# Patient Record
Sex: MALE | Race: WHITE | NOT HISPANIC OR LATINO | Employment: OTHER | URBAN - METROPOLITAN AREA
[De-identification: names, ages, dates, MRNs, and addresses within clinical notes are randomized per-mention and may not be internally consistent; named-entity substitution may affect disease eponyms.]

---

## 2017-01-11 ENCOUNTER — RESULTS ENCOUNTER (OUTPATIENT)
Dept: FAMILY MEDICINE CLINIC | Facility: CLINIC | Age: 77
End: 2017-01-11

## 2017-01-11 DIAGNOSIS — E11.9 TYPE 2 DIABETES MELLITUS WITHOUT COMPLICATION (HCC): ICD-10-CM

## 2017-01-11 DIAGNOSIS — K21.9 GASTROESOPHAGEAL REFLUX DISEASE WITHOUT ESOPHAGITIS: ICD-10-CM

## 2017-01-11 DIAGNOSIS — I10 ESSENTIAL HYPERTENSION: ICD-10-CM

## 2017-01-11 DIAGNOSIS — E78.5 HYPERLIPIDEMIA: ICD-10-CM

## 2017-01-18 ENCOUNTER — ANESTHESIA EVENT (OUTPATIENT)
Dept: GASTROENTEROLOGY | Facility: HOSPITAL | Age: 77
End: 2017-01-18

## 2017-01-18 ENCOUNTER — ANESTHESIA (OUTPATIENT)
Dept: GASTROENTEROLOGY | Facility: HOSPITAL | Age: 77
End: 2017-01-18

## 2017-01-18 PROCEDURE — 25010000002 PROPOFOL 10 MG/ML EMULSION: Performed by: ANESTHESIOLOGY

## 2017-01-18 RX ORDER — GLYCOPYRROLATE 0.2 MG/ML
INJECTION INTRAMUSCULAR; INTRAVENOUS AS NEEDED
Status: DISCONTINUED | OUTPATIENT
Start: 2017-01-18 | End: 2017-01-18 | Stop reason: SURG

## 2017-01-18 RX ORDER — PROPOFOL 10 MG/ML
VIAL (ML) INTRAVENOUS AS NEEDED
Status: DISCONTINUED | OUTPATIENT
Start: 2017-01-18 | End: 2017-01-18 | Stop reason: SURG

## 2017-01-18 RX ORDER — LIDOCAINE HYDROCHLORIDE 20 MG/ML
INJECTION, SOLUTION INFILTRATION; PERINEURAL AS NEEDED
Status: DISCONTINUED | OUTPATIENT
Start: 2017-01-18 | End: 2017-01-18 | Stop reason: SURG

## 2017-01-18 RX ADMIN — GLYCOPYRROLATE 0.2 MCG: 0.2 INJECTION INTRAMUSCULAR; INTRAVENOUS at 08:11

## 2017-01-18 RX ADMIN — LIDOCAINE HYDROCHLORIDE 100 MG: 20 INJECTION, SOLUTION INFILTRATION; PERINEURAL at 08:11

## 2017-01-18 RX ADMIN — PROPOFOL 100 MG: 10 INJECTION, EMULSION INTRAVENOUS at 08:11

## 2017-01-18 RX ADMIN — SODIUM CHLORIDE, POTASSIUM CHLORIDE, SODIUM LACTATE AND CALCIUM CHLORIDE: 600; 310; 30; 20 INJECTION, SOLUTION INTRAVENOUS at 08:04

## 2017-01-18 RX ADMIN — PROPOFOL 100 MG: 10 INJECTION, EMULSION INTRAVENOUS at 08:30

## 2017-01-18 RX ADMIN — PROPOFOL 100 MG: 10 INJECTION, EMULSION INTRAVENOUS at 08:20

## 2017-01-18 NOTE — ANESTHESIA PREPROCEDURE EVALUATION
Anesthesia Evaluation     Patient summary reviewed and Nursing notes reviewed    Airway   Mallampati: II  Neck ROM: full  no difficulty expected  Dental    (+) upper dentures and lower dentures    Comment: Lower partial    Pulmonary     breath sounds clear to auscultation  (+) hx of smoking,   Cardiovascular   (+) hypertension,     Rhythm: regular    Neuro/Psych- negative ROS  GI/Hepatic/Renal/Endo    (+)  GERD, diabetes mellitus,     Musculoskeletal (-) negative ROS    Abdominal    Substance History - negative use     OB/GYN negative ob/gyn ROS         Other                             Anesthesia Plan    ASA 2     MAC     intravenous induction   Anesthetic plan and risks discussed with patient.

## 2017-01-18 NOTE — ANESTHESIA POSTPROCEDURE EVALUATION
Patient: Eric Dukes    Procedure Summary     Date Anesthesia Start Anesthesia Stop Room / Location    01/18/17 0810 0840  [facility] /  [facility]       Procedure Diagnosis Surgeon Provider    COLONOSCOPY TO CECUM AND INTO TERMINAL ILEUM WITH COLD SNARE POLYPECTOMY (N/A ) History of adenomatous polyp of colon; Rectal polyp; Internal hemorrhoids  (History of adenomatous polyp of colon [Z86.010]) MD Kal Osman MD          Anesthesia Type: MAC  Last vitals  BP      Temp      Pulse    Resp      SpO2        Post Anesthesia Care and Evaluation    Patient location during evaluation: PACU  Patient participation: waiting for patient participation  Level of consciousness: responsive to verbal stimuli  Pain score: 0  Pain management: adequate  Airway patency: patent  Anesthetic complications: No anesthetic complications    Cardiovascular status: acceptable  Respiratory status: acceptable  Hydration status: acceptable

## 2017-01-30 ENCOUNTER — OFFICE VISIT (OUTPATIENT)
Dept: FAMILY MEDICINE CLINIC | Facility: CLINIC | Age: 77
End: 2017-01-30

## 2017-01-30 VITALS
BODY MASS INDEX: 28.04 KG/M2 | WEIGHT: 185 LBS | RESPIRATION RATE: 16 BRPM | HEART RATE: 60 BPM | DIASTOLIC BLOOD PRESSURE: 62 MMHG | OXYGEN SATURATION: 95 % | HEIGHT: 68 IN | TEMPERATURE: 97 F | SYSTOLIC BLOOD PRESSURE: 140 MMHG

## 2017-01-30 DIAGNOSIS — I10 ESSENTIAL HYPERTENSION: ICD-10-CM

## 2017-01-30 DIAGNOSIS — E11.9 TYPE 2 DIABETES MELLITUS WITHOUT COMPLICATION, WITHOUT LONG-TERM CURRENT USE OF INSULIN (HCC): Primary | ICD-10-CM

## 2017-01-30 DIAGNOSIS — E78.2 MIXED HYPERLIPIDEMIA: ICD-10-CM

## 2017-01-30 DIAGNOSIS — K21.9 GASTROESOPHAGEAL REFLUX DISEASE WITHOUT ESOPHAGITIS: ICD-10-CM

## 2017-01-30 PROCEDURE — 99214 OFFICE O/P EST MOD 30 MIN: CPT | Performed by: FAMILY MEDICINE

## 2017-01-30 RX ORDER — METOPROLOL TARTRATE 100 MG/1
100 TABLET ORAL 2 TIMES DAILY
Qty: 180 TABLET | Refills: 1 | Status: SHIPPED | OUTPATIENT
Start: 2017-01-30 | End: 2019-03-27 | Stop reason: SDUPTHER

## 2017-01-30 RX ORDER — SIMVASTATIN 40 MG
40 TABLET ORAL NIGHTLY
Qty: 90 TABLET | Refills: 1 | Status: SHIPPED | OUTPATIENT
Start: 2017-01-30

## 2017-01-30 RX ORDER — OMEPRAZOLE 20 MG/1
20 CAPSULE, DELAYED RELEASE ORAL DAILY
Qty: 90 CAPSULE | Refills: 1 | Status: SHIPPED | OUTPATIENT
Start: 2017-01-30 | End: 2017-07-28 | Stop reason: SDUPTHER

## 2017-01-30 RX ORDER — AMLODIPINE BESYLATE 10 MG/1
10 TABLET ORAL DAILY
Qty: 90 TABLET | Refills: 1 | Status: SHIPPED | OUTPATIENT
Start: 2017-01-30

## 2017-01-30 RX ORDER — LOSARTAN POTASSIUM 50 MG/1
50 TABLET ORAL 2 TIMES DAILY
Qty: 180 TABLET | Refills: 1 | Status: SHIPPED | OUTPATIENT
Start: 2017-01-30 | End: 2019-03-29 | Stop reason: SDUPTHER

## 2017-01-30 RX ORDER — HYDROCHLOROTHIAZIDE 12.5 MG/1
12.5 TABLET ORAL DAILY
Qty: 90 TABLET | Refills: 1 | Status: SHIPPED | OUTPATIENT
Start: 2017-01-30 | End: 2017-07-28 | Stop reason: SDUPTHER

## 2017-01-30 NOTE — MR AVS SNAPSHOT
Eric Dukes   1/30/2017 1:30 PM   Office Visit    Provider:  Eric Tang MD   Department:  [facility]   Dept Phone:  [phone]                Your Full Care Plan              Today's Medication Changes          These changes are accurate as of: 1/30/17  2:59 PM.  If you have any questions, ask your nurse or doctor.               Stop taking medication(s)listed here:     sildenafil 100 MG tablet   Commonly known as:  VIAGRA   Stopped by:  Eric Tang MD                Where to Get Your Medications      These medications were sent to [facility] - [place], [place] - [address] - [phone] [facility] [phone] FX  [address], [facility] [address]     Phone:  [phone]     hydrochlorothiazide 12.5 MG tablet    metFORMIN 500 MG tablet    omeprazole 20 MG capsule         You can get these medications from any pharmacy     Bring a paper prescription for each of these medications     amLODIPine 10 MG tablet    losartan 50 MG tablet    metoprolol tartrate 100 MG tablet    simvastatin 40 MG tablet                  Your Updated Medication List          This list is accurate as of: 1/30/17  2:59 PM.  Always use your most recent med list.                allopurinol 300 MG tablet   Commonly known as:  ZYLOPRIM       amLODIPine 10 MG tablet   Commonly known as:  NORVASC   Take 1 tablet by mouth Daily.       aspirin 81 MG tablet       fish oil 1000 MG capsule capsule       FREESTYLE LITE test strip   Generic drug:  glucose blood   USE ONE STRIP ONCE DAILY AS DIRECTED       hydrochlorothiazide 12.5 MG tablet   Commonly known as:  HYDRODIURIL   Take 1 tablet by mouth Daily.       losartan 50 MG tablet   Commonly known as:  COZAAR   Take 1 tablet by mouth 2 (Two) Times a Day.       metFORMIN 500 MG tablet   Commonly known as:  GLUCOPHAGE   Take two tablets twice a day with meals       metoprolol tartrate 100 MG tablet   Commonly  known as:  LOPRESSOR   Take 1 tablet by mouth 2 (Two) Times a Day.       multivitamin tablet       omeprazole 20 MG capsule   Commonly known as:  priLOSEC   Take 1 capsule by mouth Daily.       simvastatin 40 MG tablet   Commonly known as:  ZOCOR   Take 1 tablet by mouth Every Night.               You Were Diagnosed With        Codes Comments    Type 2 diabetes mellitus without complication, without long-term current use of insulin    -  Primary ICD-10-CM: E11.9  ICD-9-CM: 250.00     Essential hypertension     ICD-10-CM: I10  ICD-9-CM: 401.9     Gastroesophageal reflux disease without esophagitis     ICD-10-CM: K21.9  ICD-9-CM: 530.81     Mixed hyperlipidemia     ICD-10-CM: E78.2  ICD-9-CM: 272.2       Instructions    Exercise 30 minutes most days of the week  Sleep 6-8 hours each night if possible  Low fat, low cholesterol diet   we discussed prescribed medications and how to take them   make sure you get results of any labs/studies ordered today  Low glycemic index diet  Labs   psa  And see me 1 month         Patient Instructions History      [employer] Signup     [facility] [employer] allows you to send messages to your doctor, view your test results, renew your prescriptions, schedule appointments, and more. To sign up, go to [employer] and click on the Sign Up Now link in the New User? box. Enter your [employer] Activation Code exactly as it appears below along with the last four digits of your Social Security Number and your Date of Birth () to complete the sign-up process. If you do not sign up before the expiration date, you must request a new code.    [employer] Activation Code: [email or web address]  Expires: 2017  8:43 AM    If you have questions, you can email [email or web address] or call [phone] to talk to our [employer] staff. Remember, [employer] is NOT to be used for urgent needs. For medical emergencies, dial 911.               Other Info from Your Visit           Allergies     No  "Known Allergies      Reason for Visit     Diabetes Diabetic shoe form    Heartburn     Hyperlipidemia     Hypertension           Vital Signs     Blood Pressure Pulse Temperature Respirations Height Weight    140/62 60 97 °F (36.1 °C) (Oral) 16 67.5\" (171.5 cm) 185 lb (83.9 kg)    Oxygen Saturation Body Mass Index Smoking Status             95% 28.55 kg/m2 Former Smoker         Problems and Diagnoses Noted     High blood pressure    Acid reflux disease    High cholesterol or triglycerides    Type 2 diabetes mellitus without complication      "

## 2017-01-30 NOTE — PATIENT INSTRUCTIONS
Exercise 30 minutes most days of the week  Sleep 6-8 hours each night if possible  Low fat, low cholesterol diet   we discussed prescribed medications and how to take them   make sure you get results of any labs/studies ordered today  Low glycemic index diet  Labs   psa  And see me 1 month

## 2017-01-30 NOTE — PROGRESS NOTES
Subjective   Eric Dukes is a 76 y.o. male.     History of Present Illness   Chief Complaint:   Chief Complaint   Patient presents with   • Diabetes     Diabetic shoe form   • Heartburn   • Hyperlipidemia   • Hypertension       Eric Dukes 76 y.o. male who presents today for a follow up for hypertension, hyperlipidemia, GERD and diabetes with medication refills. He did not have labs for this appointment. He requests a form be filled out for diabetic shoes.   he has a history of   Patient Active Problem List   Diagnosis   • Hyperlipidemia   • Essential hypertension   • Gout   • Type 2 diabetes mellitus without complication   • Gastroesophageal reflux disease without esophagitis   • History of adenomatous polyp of colon   .  Since the last visit, he has overall felt well.  he has been compliant with   Current Outpatient Prescriptions:   •  allopurinol (ZYLOPRIM) 300 MG tablet, Take 300 mg by mouth daily., Disp: , Rfl:   •  amLODIPine (NORVASC) 10 MG tablet, Take 1 tablet by mouth daily., Disp: 90 tablet, Rfl: 1  •  aspirin 81 MG tablet, Take 81 mg by mouth daily., Disp: , Rfl:   •  FREESTYLE LITE test strip, USE ONE STRIP ONCE DAILY AS DIRECTED, Disp: 100 each, Rfl: 3  •  hydrochlorothiazide (HYDRODIURIL) 12.5 MG tablet, Take 1 tablet by mouth daily., Disp: 90 tablet, Rfl: 1  •  losartan (COZAAR) 50 MG tablet, Take 1 tablet by mouth 2 (two) times a day., Disp: 180 tablet, Rfl: 1  •  metFORMIN (GLUCOPHAGE) 500 MG tablet, Take two tablets twice a day with meals, Disp: 360 tablet, Rfl: 1  •  metoprolol tartrate (LOPRESSOR) 100 MG tablet, Take 1 tablet by mouth 2 (two) times a day., Disp: 180 tablet, Rfl: 1  •  Multiple Vitamin (MULTIVITAMIN) tablet, Take 1 tablet by mouth daily., Disp: , Rfl:   •  Omega-3 Fatty Acids (FISH OIL) 1000 MG capsule capsule, Take 1,000 mg by mouth 3 (three) times a day with meals., Disp: , Rfl:   •  omeprazole (PriLOSEC) 20 MG capsule, Take 1 capsule by mouth daily., Disp: 90 capsule,  "Rfl: 1  •  sildenafil (VIAGRA) 100 MG tablet, Take 1 tablet by mouth daily as needed for erectile dysfunction., Disp: 2 tablet, Rfl: 2  •  simvastatin (ZOCOR) 40 MG tablet, Take 1 tablet by mouth every night., Disp: 90 tablet, Rfl: 1.  he denies medication side effects.    All of the chronic condition(s) listed above are stable w/o issues.    Visit Vitals   • /62   • Pulse 60   • Temp 97 °F (36.1 °C) (Oral)   • Resp 16   • Ht 67.5\" (171.5 cm)   • Wt 185 lb (83.9 kg)   • SpO2 95%   • BMI 28.55 kg/m2         The following portions of the patient's history were reviewed and updated as appropriate: allergies, current medications, past family history, past medical history, past social history, past surgical history and problem list.    Review of Systems   Constitutional: Negative for activity change, appetite change and unexpected weight change.   Eyes: Negative for visual disturbance.   Respiratory: Negative for chest tightness and shortness of breath.    Cardiovascular: Negative for chest pain and palpitations.   Skin: Negative for color change.   Neurological: Negative for syncope and speech difficulty.   Psychiatric/Behavioral: Negative for confusion and decreased concentration.       Objective   Physical Exam   Constitutional: He is oriented to person, place, and time. He appears well-developed and well-nourished.   HENT:   Head: Normocephalic and atraumatic.   Nose: Nose normal.   Mouth/Throat: Oropharynx is clear and moist.   Eyes: EOM are normal. Pupils are equal, round, and reactive to light.   Neck: Normal range of motion.   Cardiovascular: Normal rate and regular rhythm.    Pulmonary/Chest: Effort normal and breath sounds normal.   Abdominal: Soft.   Musculoskeletal: Normal range of motion.   Neurological: He is alert and oriented to person, place, and time.   Skin: Skin is warm and dry.   Foot exam  Sensation ok  Some  Tingling  Color and pulses ok   Psychiatric: He has a normal mood and affect. His " behavior is normal. Judgment and thought content normal.   Nursing note and vitals reviewed.      Assessment/Plan   Eric was seen today for diabetes, heartburn, hyperlipidemia and hypertension.    Diagnoses and all orders for this visit:    Type 2 diabetes mellitus without complication, without long-term current use of insulin  -     metFORMIN (GLUCOPHAGE) 500 MG tablet; Take two tablets twice a day with meals    Essential hypertension  -     amLODIPine (NORVASC) 10 MG tablet; Take 1 tablet by mouth Daily.  -     hydrochlorothiazide (HYDRODIURIL) 12.5 MG tablet; Take 1 tablet by mouth Daily.  -     losartan (COZAAR) 50 MG tablet; Take 1 tablet by mouth 2 (Two) Times a Day.  -     metoprolol tartrate (LOPRESSOR) 100 MG tablet; Take 1 tablet by mouth 2 (Two) Times a Day.    Gastroesophageal reflux disease without esophagitis  -     omeprazole (priLOSEC) 20 MG capsule; Take 1 capsule by mouth Daily.    Mixed hyperlipidemia  -     simvastatin (ZOCOR) 40 MG tablet; Take 1 tablet by mouth Every Night.

## 2017-02-01 LAB
ALBUMIN SERPL-MCNC: 5 G/DL (ref 3.5–5.2)
ALBUMIN/GLOB SERPL: 2.2 G/DL
ALP SERPL-CCNC: 78 U/L (ref 39–117)
ALT SERPL-CCNC: 31 U/L (ref 1–41)
AST SERPL-CCNC: 34 U/L (ref 1–40)
BASOPHILS # BLD AUTO: 0.02 10*3/MM3 (ref 0–0.2)
BASOPHILS NFR BLD AUTO: 0.3 % (ref 0–1.5)
BILIRUB SERPL-MCNC: 0.7 MG/DL (ref 0.1–1.2)
BUN SERPL-MCNC: 23 MG/DL (ref 8–23)
BUN/CREAT SERPL: 19.7 (ref 7–25)
CALCIUM SERPL-MCNC: 10.1 MG/DL (ref 8.6–10.5)
CHLORIDE SERPL-SCNC: 99 MMOL/L (ref 98–107)
CHOLEST SERPL-MCNC: 141 MG/DL (ref 0–200)
CO2 SERPL-SCNC: 26.1 MMOL/L (ref 22–29)
CREAT SERPL-MCNC: 1.17 MG/DL (ref 0.76–1.27)
EOSINOPHIL # BLD AUTO: 0.23 10*3/MM3 (ref 0–0.7)
EOSINOPHIL NFR BLD AUTO: 3.2 % (ref 0.3–6.2)
ERYTHROCYTE [DISTWIDTH] IN BLOOD BY AUTOMATED COUNT: 14.3 % (ref 11.5–14.5)
GLOBULIN SER CALC-MCNC: 2.3 GM/DL
GLUCOSE SERPL-MCNC: 132 MG/DL (ref 65–99)
HBA1C MFR BLD: 6.5 % (ref 4.8–5.6)
HCT VFR BLD AUTO: 39.7 % (ref 40.4–52.2)
HDLC SERPL-MCNC: 39 MG/DL (ref 40–60)
HGB BLD-MCNC: 12.3 G/DL (ref 13.7–17.6)
IMM GRANULOCYTES # BLD: 0 10*3/MM3 (ref 0–0.03)
IMM GRANULOCYTES NFR BLD: 0 % (ref 0–0.5)
LDLC SERPL CALC-MCNC: 64 MG/DL (ref 0–100)
LYMPHOCYTES # BLD AUTO: 2.25 10*3/MM3 (ref 0.9–4.8)
LYMPHOCYTES NFR BLD AUTO: 31.6 % (ref 19.6–45.3)
MCH RBC QN AUTO: 25.9 PG (ref 27–32.7)
MCHC RBC AUTO-ENTMCNC: 31 G/DL (ref 32.6–36.4)
MCV RBC AUTO: 83.8 FL (ref 79.8–96.2)
MONOCYTES # BLD AUTO: 0.69 10*3/MM3 (ref 0.2–1.2)
MONOCYTES NFR BLD AUTO: 9.7 % (ref 5–12)
NEUTROPHILS # BLD AUTO: 3.92 10*3/MM3 (ref 1.9–8.1)
NEUTROPHILS NFR BLD AUTO: 55.2 % (ref 42.7–76)
PLATELET # BLD AUTO: 270 10*3/MM3 (ref 140–500)
POTASSIUM SERPL-SCNC: 5 MMOL/L (ref 3.5–5.2)
PROT SERPL-MCNC: 7.3 G/DL (ref 6–8.5)
RBC # BLD AUTO: 4.74 10*6/MM3 (ref 4.6–6)
SODIUM SERPL-SCNC: 141 MMOL/L (ref 136–145)
TRIGL SERPL-MCNC: 190 MG/DL (ref 0–150)
TSH SERPL DL<=0.005 MIU/L-ACNC: 4.07 MIU/ML (ref 0.27–4.2)
VLDLC SERPL CALC-MCNC: 38 MG/DL (ref 5–40)
WBC # BLD AUTO: 7.11 10*3/MM3 (ref 4.5–10.7)

## 2017-02-13 ENCOUNTER — OFFICE VISIT (OUTPATIENT)
Dept: FAMILY MEDICINE CLINIC | Facility: CLINIC | Age: 77
End: 2017-02-13

## 2017-02-13 VITALS
HEART RATE: 57 BPM | WEIGHT: 185 LBS | HEIGHT: 68 IN | OXYGEN SATURATION: 96 % | BODY MASS INDEX: 28.04 KG/M2 | DIASTOLIC BLOOD PRESSURE: 64 MMHG | TEMPERATURE: 97.4 F | SYSTOLIC BLOOD PRESSURE: 152 MMHG

## 2017-02-13 DIAGNOSIS — I10 ESSENTIAL HYPERTENSION: ICD-10-CM

## 2017-02-13 DIAGNOSIS — E78.5 HYPERLIPIDEMIA, UNSPECIFIED HYPERLIPIDEMIA TYPE: ICD-10-CM

## 2017-02-13 DIAGNOSIS — E11.9 TYPE 2 DIABETES MELLITUS WITHOUT COMPLICATION, WITHOUT LONG-TERM CURRENT USE OF INSULIN (HCC): Primary | ICD-10-CM

## 2017-02-13 PROCEDURE — 99213 OFFICE O/P EST LOW 20 MIN: CPT | Performed by: FAMILY MEDICINE

## 2017-02-13 NOTE — PATIENT INSTRUCTIONS
Exercise 30 minutes most days of the week  Sleep 6-8 hours each night if possible  Low fat, low cholesterol diet   we discussed prescribed medications and how to take them   make sure you get results of any labs/studies ordered today  Low glycemic index diet  Glucometer given to patient

## 2017-02-13 NOTE — PROGRESS NOTES
Subjective   Eric Dukes is a 76 y.o. male.     History of Present Illness   Chief Complaint:   Chief Complaint   Patient presents with   • Diabetes   • Hyperlipidemia       Eric Dukes 76 y.o. male who presents today for Medical Management of the below listed issues and medication refills.  he has a history of   Patient Active Problem List   Diagnosis   • Hyperlipidemia   • Essential hypertension   • Gout   • Type 2 diabetes mellitus without complication   • Gastroesophageal reflux disease without esophagitis   • History of adenomatous polyp of colon   .  Since the last visit, he has overall felt well.  he has been compliant with   Current Outpatient Prescriptions:   •  allopurinol (ZYLOPRIM) 300 MG tablet, Take 300 mg by mouth daily., Disp: , Rfl:   •  amLODIPine (NORVASC) 10 MG tablet, Take 1 tablet by mouth Daily., Disp: 90 tablet, Rfl: 1  •  aspirin 81 MG tablet, Take 81 mg by mouth daily., Disp: , Rfl:   •  FREESTYLE LITE test strip, USE ONE STRIP ONCE DAILY AS DIRECTED, Disp: 100 each, Rfl: 3  •  hydrochlorothiazide (HYDRODIURIL) 12.5 MG tablet, Take 1 tablet by mouth Daily., Disp: 90 tablet, Rfl: 1  •  losartan (COZAAR) 50 MG tablet, Take 1 tablet by mouth 2 (Two) Times a Day., Disp: 180 tablet, Rfl: 1  •  metFORMIN (GLUCOPHAGE) 500 MG tablet, Take two tablets twice a day with meals, Disp: 360 tablet, Rfl: 1  •  metoprolol tartrate (LOPRESSOR) 100 MG tablet, Take 1 tablet by mouth 2 (Two) Times a Day., Disp: 180 tablet, Rfl: 1  •  Multiple Vitamin (MULTIVITAMIN) tablet, Take 1 tablet by mouth daily., Disp: , Rfl:   •  Omega-3 Fatty Acids (FISH OIL) 1000 MG capsule capsule, Take 1,000 mg by mouth 3 (three) times a day with meals., Disp: , Rfl:   •  omeprazole (priLOSEC) 20 MG capsule, Take 1 capsule by mouth Daily., Disp: 90 capsule, Rfl: 1  •  simvastatin (ZOCOR) 40 MG tablet, Take 1 tablet by mouth Every Night., Disp: 90 tablet, Rfl: 1.  he denies medication side effects.    All of the chronic  "condition(s) listed above are stable w/o issues.    Visit Vitals   • /64   • Pulse 57   • Temp 97.4 °F (36.3 °C)   • Ht 67.5\" (171.5 cm)   • Wt 185 lb (83.9 kg)   • SpO2 96%   • BMI 28.55 kg/m2             The following portions of the patient's history were reviewed and updated as appropriate: allergies, current medications, past family history, past medical history, past social history, past surgical history and problem list.    Review of Systems   Constitutional: Negative for activity change, appetite change and unexpected weight change.   HENT: Negative for nosebleeds and trouble swallowing.    Eyes: Negative for pain and visual disturbance.   Respiratory: Negative for chest tightness, shortness of breath and wheezing.    Cardiovascular: Negative for chest pain and palpitations.   Gastrointestinal: Negative for abdominal pain and blood in stool.   Endocrine: Negative.    Genitourinary: Negative for difficulty urinating and hematuria.   Musculoskeletal: Negative for joint swelling.   Skin: Negative for color change and rash.   Allergic/Immunologic: Negative.    Neurological: Negative for syncope and speech difficulty.   Hematological: Negative for adenopathy.   Psychiatric/Behavioral: Negative for agitation and confusion.   All other systems reviewed and are negative.      Objective   Physical Exam   Cardiovascular: Normal rate and regular rhythm.    Pulmonary/Chest: Effort normal and breath sounds normal.   Skin: Skin is warm and dry.   Psychiatric: He has a normal mood and affect. His behavior is normal.   Nursing note and vitals reviewed.      Assessment/Plan   Eric was seen today for diabetes and hyperlipidemia.    Diagnoses and all orders for this visit:    Type 2 diabetes mellitus without complication, without long-term current use of insulin    Hyperlipidemia, unspecified hyperlipidemia type    Essential hypertension               "

## 2017-03-20 DIAGNOSIS — E11.9 TYPE 2 DIABETES MELLITUS WITHOUT COMPLICATION, WITHOUT LONG-TERM CURRENT USE OF INSULIN (HCC): Primary | ICD-10-CM

## 2017-03-20 RX ORDER — BLOOD-GLUCOSE METER
KIT MISCELLANEOUS
Qty: 100 EACH | Refills: 3 | Status: SHIPPED | OUTPATIENT
Start: 2017-03-20

## 2017-03-20 RX ORDER — BLOOD-GLUCOSE METER
KIT MISCELLANEOUS
Qty: 100 EACH | Refills: 3 | Status: SHIPPED | OUTPATIENT
Start: 2017-03-20 | End: 2017-03-20 | Stop reason: SDUPTHER

## 2017-06-19 DIAGNOSIS — I10 ESSENTIAL HYPERTENSION: ICD-10-CM

## 2017-06-19 DIAGNOSIS — K21.9 GASTROESOPHAGEAL REFLUX DISEASE WITHOUT ESOPHAGITIS: ICD-10-CM

## 2017-06-19 DIAGNOSIS — E11.9 TYPE 2 DIABETES MELLITUS WITHOUT COMPLICATION, WITHOUT LONG-TERM CURRENT USE OF INSULIN (HCC): ICD-10-CM

## 2017-06-19 RX ORDER — OMEPRAZOLE 20 MG/1
CAPSULE, DELAYED RELEASE ORAL
Qty: 90 CAPSULE | Refills: 1 | OUTPATIENT
Start: 2017-06-19

## 2017-06-19 RX ORDER — HYDROCHLOROTHIAZIDE 12.5 MG/1
TABLET ORAL
Qty: 90 TABLET | Refills: 1 | OUTPATIENT
Start: 2017-06-19

## 2017-07-11 DIAGNOSIS — E78.2 MIXED HYPERLIPIDEMIA: Primary | ICD-10-CM

## 2017-07-11 DIAGNOSIS — M10.9 GOUT, UNSPECIFIED CAUSE, UNSPECIFIED CHRONICITY, UNSPECIFIED SITE: ICD-10-CM

## 2017-07-11 DIAGNOSIS — I10 ESSENTIAL HYPERTENSION: ICD-10-CM

## 2017-07-11 DIAGNOSIS — E11.9 TYPE 2 DIABETES MELLITUS WITHOUT COMPLICATION, WITHOUT LONG-TERM CURRENT USE OF INSULIN (HCC): ICD-10-CM

## 2017-07-13 LAB
ALBUMIN SERPL-MCNC: 4.6 G/DL (ref 3.5–5.2)
ALBUMIN/GLOB SERPL: 2 G/DL
ALP SERPL-CCNC: 72 U/L (ref 39–117)
ALT SERPL-CCNC: 29 U/L (ref 1–41)
AST SERPL-CCNC: 27 U/L (ref 1–40)
BASOPHILS # BLD AUTO: 0.04 10*3/MM3 (ref 0–0.2)
BASOPHILS NFR BLD AUTO: 0.6 % (ref 0–1.5)
BILIRUB SERPL-MCNC: 0.5 MG/DL (ref 0.1–1.2)
BUN SERPL-MCNC: 19 MG/DL (ref 8–23)
BUN/CREAT SERPL: 16.1 (ref 7–25)
CALCIUM SERPL-MCNC: 9.6 MG/DL (ref 8.6–10.5)
CHLORIDE SERPL-SCNC: 98 MMOL/L (ref 98–107)
CHOLEST SERPL-MCNC: 142 MG/DL (ref 0–200)
CO2 SERPL-SCNC: 22.6 MMOL/L (ref 22–29)
CREAT SERPL-MCNC: 1.18 MG/DL (ref 0.76–1.27)
EOSINOPHIL # BLD AUTO: 0.25 10*3/MM3 (ref 0–0.7)
EOSINOPHIL NFR BLD AUTO: 3.6 % (ref 0.3–6.2)
ERYTHROCYTE [DISTWIDTH] IN BLOOD BY AUTOMATED COUNT: 17 % (ref 11.5–14.5)
GLOBULIN SER CALC-MCNC: 2.3 GM/DL
GLUCOSE SERPL-MCNC: 175 MG/DL (ref 65–99)
HBA1C MFR BLD: 6.72 % (ref 4.8–5.6)
HCT VFR BLD AUTO: 38.8 % (ref 40.4–52.2)
HDLC SERPL-MCNC: 40 MG/DL (ref 40–60)
HGB BLD-MCNC: 12 G/DL (ref 13.7–17.6)
IMM GRANULOCYTES # BLD: 0 10*3/MM3 (ref 0–0.03)
IMM GRANULOCYTES NFR BLD: 0 % (ref 0–0.5)
LDLC SERPL CALC-MCNC: 66 MG/DL (ref 0–100)
LYMPHOCYTES # BLD AUTO: 2.16 10*3/MM3 (ref 0.9–4.8)
LYMPHOCYTES NFR BLD AUTO: 31.1 % (ref 19.6–45.3)
MCH RBC QN AUTO: 25 PG (ref 27–32.7)
MCHC RBC AUTO-ENTMCNC: 30.9 G/DL (ref 32.6–36.4)
MCV RBC AUTO: 80.8 FL (ref 79.8–96.2)
MONOCYTES # BLD AUTO: 0.66 10*3/MM3 (ref 0.2–1.2)
MONOCYTES NFR BLD AUTO: 9.5 % (ref 5–12)
NEUTROPHILS # BLD AUTO: 3.84 10*3/MM3 (ref 1.9–8.1)
NEUTROPHILS NFR BLD AUTO: 55.2 % (ref 42.7–76)
PLATELET # BLD AUTO: 262 10*3/MM3 (ref 140–500)
POTASSIUM SERPL-SCNC: 5.1 MMOL/L (ref 3.5–5.2)
PROT SERPL-MCNC: 6.9 G/DL (ref 6–8.5)
RBC # BLD AUTO: 4.8 10*6/MM3 (ref 4.6–6)
SODIUM SERPL-SCNC: 138 MMOL/L (ref 136–145)
TRIGL SERPL-MCNC: 179 MG/DL (ref 0–150)
TSH SERPL DL<=0.005 MIU/L-ACNC: 3.15 MIU/ML (ref 0.27–4.2)
URATE SERPL-MCNC: 4.8 MG/DL (ref 3.4–7)
VLDLC SERPL CALC-MCNC: 35.8 MG/DL (ref 5–40)
WBC # BLD AUTO: 6.95 10*3/MM3 (ref 4.5–10.7)

## 2017-07-28 ENCOUNTER — OFFICE VISIT (OUTPATIENT)
Dept: FAMILY MEDICINE CLINIC | Facility: CLINIC | Age: 77
End: 2017-07-28

## 2017-07-28 VITALS
SYSTOLIC BLOOD PRESSURE: 124 MMHG | HEART RATE: 60 BPM | TEMPERATURE: 97.4 F | RESPIRATION RATE: 16 BRPM | HEIGHT: 68 IN | DIASTOLIC BLOOD PRESSURE: 63 MMHG | WEIGHT: 190 LBS | BODY MASS INDEX: 28.79 KG/M2 | OXYGEN SATURATION: 97 %

## 2017-07-28 DIAGNOSIS — K21.9 GASTROESOPHAGEAL REFLUX DISEASE WITHOUT ESOPHAGITIS: ICD-10-CM

## 2017-07-28 DIAGNOSIS — E11.9 TYPE 2 DIABETES MELLITUS WITHOUT COMPLICATION, WITHOUT LONG-TERM CURRENT USE OF INSULIN (HCC): ICD-10-CM

## 2017-07-28 DIAGNOSIS — I10 ESSENTIAL HYPERTENSION: ICD-10-CM

## 2017-07-28 DIAGNOSIS — E78.2 MIXED HYPERLIPIDEMIA: ICD-10-CM

## 2017-07-28 PROCEDURE — 99214 OFFICE O/P EST MOD 30 MIN: CPT | Performed by: FAMILY MEDICINE

## 2017-07-28 RX ORDER — METOPROLOL TARTRATE 100 MG/1
100 TABLET ORAL 2 TIMES DAILY
Qty: 180 TABLET | Refills: 1 | Status: CANCELLED | OUTPATIENT
Start: 2017-07-28

## 2017-07-28 RX ORDER — AMLODIPINE BESYLATE 10 MG/1
10 TABLET ORAL DAILY
Qty: 90 TABLET | Refills: 1 | Status: CANCELLED | OUTPATIENT
Start: 2017-07-28

## 2017-07-28 RX ORDER — OMEPRAZOLE 20 MG/1
20 CAPSULE, DELAYED RELEASE ORAL DAILY
Qty: 90 CAPSULE | Refills: 1 | Status: SHIPPED | OUTPATIENT
Start: 2017-07-28 | End: 2018-01-29 | Stop reason: SDUPTHER

## 2017-07-28 RX ORDER — HYDROCHLOROTHIAZIDE 12.5 MG/1
12.5 TABLET ORAL DAILY
Qty: 90 TABLET | Refills: 1 | Status: SHIPPED | OUTPATIENT
Start: 2017-07-28 | End: 2018-01-29 | Stop reason: SDUPTHER

## 2017-07-28 RX ORDER — LOSARTAN POTASSIUM 50 MG/1
50 TABLET ORAL 2 TIMES DAILY
Qty: 180 TABLET | Refills: 1 | Status: CANCELLED | OUTPATIENT
Start: 2017-07-28

## 2017-07-28 RX ORDER — SIMVASTATIN 40 MG
40 TABLET ORAL NIGHTLY
Qty: 90 TABLET | Refills: 1 | Status: CANCELLED | OUTPATIENT
Start: 2017-07-28

## 2017-07-28 NOTE — PROGRESS NOTES
Subjective   Eric Dukes is a 77 y.o. male.     History of Present Illness   Chief Complaint:   Chief Complaint   Patient presents with   • Hypertension   • Hyperlipidemia   • Heartburn   • Diabetes       Eric Dukes 77 y.o. male who presents today for Medical Management of the below listed issues and medication refills. I reviewed his lab results.  hba1c 6.72  bs 175   bs higher in am.  Some parathesia toes  Sees dr ahn.    [facility] will [facility]  Refill meds  he has a history of   Patient Active Problem List   Diagnosis   • Hyperlipidemia   • Essential hypertension   • Gout   • Type 2 diabetes mellitus without complication   • Gastroesophageal reflux disease without esophagitis   • History of adenomatous polyp of colon   .  Since the last visit, he has overall felt well.  he has been compliant with   Current Outpatient Prescriptions:   •  allopurinol (ZYLOPRIM) 300 MG tablet, Take 300 mg by mouth daily., Disp: , Rfl:   •  amLODIPine (NORVASC) 10 MG tablet, Take 1 tablet by mouth Daily., Disp: 90 tablet, Rfl: 1  •  aspirin 81 MG tablet, Take 81 mg by mouth daily., Disp: , Rfl:   •  Blood Glucose Monitoring Suppl (FREESTYLE LITE) device, Check blood sugar once per day, Disp: 100 each, Rfl: 3  •  FREESTYLE LITE test strip, USE ONE STRIP ONCE DAILY AS DIRECTED, Disp: 100 each, Rfl: 3  •  hydrochlorothiazide (HYDRODIURIL) 12.5 MG tablet, Take 1 tablet by mouth Daily., Disp: 90 tablet, Rfl: 1  •  losartan (COZAAR) 50 MG tablet, Take 1 tablet by mouth 2 (Two) Times a Day., Disp: 180 tablet, Rfl: 1  •  metFORMIN (GLUCOPHAGE) 500 MG tablet, Take two tablets twice a day with meals, Disp: 360 tablet, Rfl: 1  •  metoprolol tartrate (LOPRESSOR) 100 MG tablet, Take 1 tablet by mouth 2 (Two) Times a Day., Disp: 180 tablet, Rfl: 1  •  Multiple Vitamin (MULTIVITAMIN) tablet, Take 1 tablet by mouth daily., Disp: , Rfl:   •  Omega-3 Fatty Acids (FISH OIL) 1000 MG capsule capsule, Take  "1,000 mg by mouth 3 (three) times a day with meals., Disp: , Rfl:   •  omeprazole (priLOSEC) 20 MG capsule, Take 1 capsule by mouth Daily., Disp: 90 capsule, Rfl: 1  •  simvastatin (ZOCOR) 40 MG tablet, Take 1 tablet by mouth Every Night., Disp: 90 tablet, Rfl: 1.  he denies medication side effects.    All of the chronic condition(s) listed above are stable w/o issues.    /63  Pulse 60  Temp 97.4 °F (36.3 °C) (Oral)   Resp 16  Ht 67.5\" (171.5 cm)  Wt 190 lb (86.2 kg)  SpO2 97%  BMI 29.32 kg/m2    Results for orders placed or performed in visit on 07/11/17   Comprehensive Metabolic Panel   Result Value Ref Range    Glucose 175 (H) 65 - 99 mg/dL    BUN 19 8 - 23 mg/dL    Creatinine 1.18 0.76 - 1.27 mg/dL    eGFR Non African Am 60 (L) >60 mL/min/1.73    eGFR African Am 73 >60 mL/min/1.73    BUN/Creatinine Ratio 16.1 7.0 - 25.0    Sodium 138 136 - 145 mmol/L    Potassium 5.1 3.5 - 5.2 mmol/L    Chloride 98 98 - 107 mmol/L    Total CO2 22.6 22.0 - 29.0 mmol/L    Calcium 9.6 8.6 - 10.5 mg/dL    Total Protein 6.9 6.0 - 8.5 g/dL    Albumin 4.60 3.50 - 5.20 g/dL    Globulin 2.3 gm/dL    A/G Ratio 2.0 g/dL    Total Bilirubin 0.5 0.1 - 1.2 mg/dL    Alkaline Phosphatase 72 39 - 117 U/L    AST (SGOT) 27 1 - 40 U/L    ALT (SGPT) 29 1 - 41 U/L   Hemoglobin A1c   Result Value Ref Range    Hemoglobin A1C 6.72 (H) 4.80 - 5.60 %   TSH   Result Value Ref Range    TSH 3.150 0.270 - 4.200 mIU/mL   Lipid Panel   Result Value Ref Range    Total Cholesterol 142 0 - 200 mg/dL    Triglycerides 179 (H) 0 - 150 mg/dL    HDL Cholesterol 40 40 - 60 mg/dL    VLDL Cholesterol 35.8 5 - 40 mg/dL    LDL Cholesterol  66 0 - 100 mg/dL   Uric acid   Result Value Ref Range    Uric Acid 4.8 3.4 - 7.0 mg/dL   CBC & Differential   Result Value Ref Range    WBC 6.95 4.50 - 10.70 10*3/mm3    RBC 4.80 4.60 - 6.00 10*6/mm3    Hemoglobin 12.0 (L) 13.7 - 17.6 g/dL    Hematocrit 38.8 (L) 40.4 - 52.2 %    MCV 80.8 79.8 - 96.2 fL    MCH 25.0 (L) 27.0 - " 32.7 pg    MCHC 30.9 (L) 32.6 - 36.4 g/dL    RDW 17.0 (H) 11.5 - 14.5 %    Platelets 262 140 - 500 10*3/mm3    Neutrophil Rel % 55.2 42.7 - 76.0 %    Lymphocyte Rel % 31.1 19.6 - 45.3 %    Monocyte Rel % 9.5 5.0 - 12.0 %    Eosinophil Rel % 3.6 0.3 - 6.2 %    Basophil Rel % 0.6 0.0 - 1.5 %    Neutrophils Absolute 3.84 1.90 - 8.10 10*3/mm3    Lymphocytes Absolute 2.16 0.90 - 4.80 10*3/mm3    Monocytes Absolute 0.66 0.20 - 1.20 10*3/mm3    Eosinophils Absolute 0.25 0.00 - 0.70 10*3/mm3    Basophils Absolute 0.04 0.00 - 0.20 10*3/mm3    Immature Granulocyte Rel % 0.0 0.0 - 0.5 %    Immature Grans Absolute 0.00 0.00 - 0.03 10*3/mm3         The following portions of the patient's history were reviewed and updated as appropriate: allergies, current medications, past family history, past medical history, past social history, past surgical history and problem list.    Review of Systems   Constitutional: Negative for activity change, appetite change and unexpected weight change.   Eyes: Negative for visual disturbance.   Respiratory: Negative for chest tightness and shortness of breath.    Cardiovascular: Negative for chest pain and palpitations.   Skin: Negative for color change.   Neurological: Negative for syncope and speech difficulty.   Psychiatric/Behavioral: Negative for confusion and decreased concentration.       Objective   Physical Exam   Constitutional: He is oriented to person, place, and time. He appears well-developed and well-nourished.   HENT:   Head: Normocephalic.   Mouth/Throat: Oropharynx is clear and moist.   Eyes: EOM are normal. Pupils are equal, round, and reactive to light.   Neck: Normal range of motion. Neck supple. No thyromegaly present.   Cardiovascular: Normal rate and regular rhythm.    Pulmonary/Chest: Effort normal and breath sounds normal.   Abdominal: Soft. Bowel sounds are normal.   Musculoskeletal: Normal range of motion.   Lymphadenopathy:     He has no cervical adenopathy.    Neurological: He is alert and oriented to person, place, and time.   Skin: Skin is warm and dry.   Psychiatric: He has a normal mood and affect. His behavior is normal.   Nursing note and vitals reviewed.      Assessment/Plan   Eric was seen today for hypertension, hyperlipidemia, heartburn and diabetes.    Diagnoses and all orders for this visit:    Essential hypertension  -     hydrochlorothiazide (HYDRODIURIL) 12.5 MG tablet; Take 1 tablet by mouth Daily.  -     Comprehensive metabolic panel; Future  -     Lipid panel; Future  -     CBC and Differential; Future  -     TSH; Future  -     Hemoglobin A1c; Future    Type 2 diabetes mellitus without complication, without long-term current use of insulin  -     metFORMIN (GLUCOPHAGE) 500 MG tablet; Take two tablets twice a day with meals  -     Comprehensive metabolic panel; Future  -     Lipid panel; Future  -     CBC and Differential; Future  -     TSH; Future  -     Hemoglobin A1c; Future    Gastroesophageal reflux disease without esophagitis  -     omeprazole (priLOSEC) 20 MG capsule; Take 1 capsule by mouth Daily.  -     Comprehensive metabolic panel; Future  -     Lipid panel; Future  -     CBC and Differential; Future  -     TSH; Future  -     Hemoglobin A1c; Future    Mixed hyperlipidemia  -     Comprehensive metabolic panel; Future  -     Lipid panel; Future  -     CBC and Differential; Future  -     TSH; Future  -     Hemoglobin A1c; Future    Other orders  -     Cancel: amLODIPine (NORVASC) 10 MG tablet; Take 1 tablet by mouth Daily.  -     Cancel: losartan (COZAAR) 50 MG tablet; Take 1 tablet by mouth 2 (Two) Times a Day.  -     Cancel: metoprolol tartrate (LOPRESSOR) 100 MG tablet; Take 1 tablet by mouth 2 (Two) Times a Day.  -     Cancel: simvastatin (ZOCOR) 40 MG tablet; Take 1 tablet by mouth Every Night.

## 2017-12-11 DIAGNOSIS — E11.9 TYPE 2 DIABETES MELLITUS WITHOUT COMPLICATION, WITHOUT LONG-TERM CURRENT USE OF INSULIN (HCC): ICD-10-CM

## 2017-12-11 DIAGNOSIS — K21.9 GASTROESOPHAGEAL REFLUX DISEASE WITHOUT ESOPHAGITIS: ICD-10-CM

## 2017-12-11 DIAGNOSIS — I10 ESSENTIAL HYPERTENSION: ICD-10-CM

## 2017-12-12 RX ORDER — OMEPRAZOLE 20 MG/1
CAPSULE, DELAYED RELEASE ORAL
Qty: 90 CAPSULE | Refills: 1 | OUTPATIENT
Start: 2017-12-12

## 2017-12-12 RX ORDER — HYDROCHLOROTHIAZIDE 12.5 MG/1
TABLET ORAL
Qty: 90 TABLET | Refills: 1 | OUTPATIENT
Start: 2017-12-12

## 2017-12-28 ENCOUNTER — RESULTS ENCOUNTER (OUTPATIENT)
Dept: FAMILY MEDICINE CLINIC | Facility: CLINIC | Age: 77
End: 2017-12-28

## 2017-12-28 DIAGNOSIS — I10 ESSENTIAL HYPERTENSION: ICD-10-CM

## 2017-12-28 DIAGNOSIS — K21.9 GASTROESOPHAGEAL REFLUX DISEASE WITHOUT ESOPHAGITIS: ICD-10-CM

## 2017-12-28 DIAGNOSIS — E11.9 TYPE 2 DIABETES MELLITUS WITHOUT COMPLICATION, WITHOUT LONG-TERM CURRENT USE OF INSULIN (HCC): ICD-10-CM

## 2017-12-28 DIAGNOSIS — E78.2 MIXED HYPERLIPIDEMIA: ICD-10-CM

## 2018-01-18 LAB
ALBUMIN SERPL-MCNC: 4.6 G/DL (ref 3.5–5.2)
ALBUMIN/GLOB SERPL: 1.9 G/DL
ALP SERPL-CCNC: 78 U/L (ref 39–117)
ALT SERPL-CCNC: 22 U/L (ref 1–41)
AST SERPL-CCNC: 24 U/L (ref 1–40)
BASOPHILS # BLD AUTO: 0.04 10*3/MM3 (ref 0–0.2)
BASOPHILS NFR BLD AUTO: 0.6 % (ref 0–1.5)
BILIRUB SERPL-MCNC: 0.4 MG/DL (ref 0.1–1.2)
BUN SERPL-MCNC: 20 MG/DL (ref 8–23)
BUN/CREAT SERPL: 17.7 (ref 7–25)
CALCIUM SERPL-MCNC: 9.9 MG/DL (ref 8.6–10.5)
CHLORIDE SERPL-SCNC: 99 MMOL/L (ref 98–107)
CHOLEST SERPL-MCNC: 123 MG/DL (ref 0–200)
CO2 SERPL-SCNC: 24.6 MMOL/L (ref 22–29)
CREAT SERPL-MCNC: 1.13 MG/DL (ref 0.76–1.27)
DIFFERENTIAL COMMENT: NORMAL
EOSINOPHIL # BLD AUTO: 0.27 10*3/MM3 (ref 0–0.7)
EOSINOPHIL NFR BLD AUTO: 3.8 % (ref 0.3–6.2)
ERYTHROCYTE [DISTWIDTH] IN BLOOD BY AUTOMATED COUNT: 18.2 % (ref 11.5–14.5)
GLOBULIN SER CALC-MCNC: 2.4 GM/DL
GLUCOSE SERPL-MCNC: 135 MG/DL (ref 65–99)
HBA1C MFR BLD: 6.8 % (ref 4.8–5.6)
HCT VFR BLD AUTO: 38.6 % (ref 40.4–52.2)
HDLC SERPL-MCNC: 43 MG/DL (ref 40–60)
HGB BLD-MCNC: 11.2 G/DL (ref 13.7–17.6)
IMM GRANULOCYTES # BLD: 0.02 10*3/MM3 (ref 0–0.03)
IMM GRANULOCYTES NFR BLD: 0.3 % (ref 0–0.5)
LDLC SERPL CALC-MCNC: 59 MG/DL (ref 0–100)
LYMPHOCYTES # BLD AUTO: 2.15 10*3/MM3 (ref 0.9–4.8)
LYMPHOCYTES NFR BLD AUTO: 30.1 % (ref 19.6–45.3)
MCH RBC QN AUTO: 21.5 PG (ref 27–32.7)
MCHC RBC AUTO-ENTMCNC: 29 G/DL (ref 32.6–36.4)
MCV RBC AUTO: 74.1 FL (ref 79.8–96.2)
MONOCYTES # BLD AUTO: 0.84 10*3/MM3 (ref 0.2–1.2)
MONOCYTES NFR BLD AUTO: 11.8 % (ref 5–12)
NEUTROPHILS # BLD AUTO: 3.82 10*3/MM3 (ref 1.9–8.1)
NEUTROPHILS NFR BLD AUTO: 53.4 % (ref 42.7–76)
PLATELET # BLD AUTO: 267 10*3/MM3 (ref 140–500)
PLATELET BLD QL SMEAR: NORMAL
POTASSIUM SERPL-SCNC: 5.2 MMOL/L (ref 3.5–5.2)
PROT SERPL-MCNC: 7 G/DL (ref 6–8.5)
RBC # BLD AUTO: 5.21 10*6/MM3 (ref 4.6–6)
RBC MORPH BLD: NORMAL
SODIUM SERPL-SCNC: 138 MMOL/L (ref 136–145)
TRIGL SERPL-MCNC: 103 MG/DL (ref 0–150)
TSH SERPL DL<=0.005 MIU/L-ACNC: 3.01 MIU/ML (ref 0.27–4.2)
VLDLC SERPL CALC-MCNC: 20.6 MG/DL (ref 5–40)
WBC # BLD AUTO: 7.14 10*3/MM3 (ref 4.5–10.7)

## 2018-01-29 ENCOUNTER — OFFICE VISIT (OUTPATIENT)
Dept: FAMILY MEDICINE CLINIC | Facility: CLINIC | Age: 78
End: 2018-01-29

## 2018-01-29 VITALS
DIASTOLIC BLOOD PRESSURE: 60 MMHG | OXYGEN SATURATION: 97 % | SYSTOLIC BLOOD PRESSURE: 140 MMHG | HEART RATE: 63 BPM | TEMPERATURE: 97.6 F | WEIGHT: 179 LBS | BODY MASS INDEX: 27.13 KG/M2 | HEIGHT: 68 IN | RESPIRATION RATE: 16 BRPM

## 2018-01-29 DIAGNOSIS — D64.9 ANEMIA, UNSPECIFIED TYPE: ICD-10-CM

## 2018-01-29 DIAGNOSIS — E78.2 MIXED HYPERLIPIDEMIA: ICD-10-CM

## 2018-01-29 DIAGNOSIS — K21.9 GASTROESOPHAGEAL REFLUX DISEASE WITHOUT ESOPHAGITIS: ICD-10-CM

## 2018-01-29 DIAGNOSIS — E11.9 TYPE 2 DIABETES MELLITUS WITHOUT COMPLICATION, WITHOUT LONG-TERM CURRENT USE OF INSULIN (HCC): ICD-10-CM

## 2018-01-29 DIAGNOSIS — I10 ESSENTIAL HYPERTENSION: Primary | ICD-10-CM

## 2018-01-29 LAB
BASOPHILS # BLD AUTO: 0.04 10*3/MM3 (ref 0–0.2)
BASOPHILS NFR BLD AUTO: 0.5 % (ref 0–1.5)
EOSINOPHIL # BLD AUTO: 0.28 10*3/MM3 (ref 0–0.7)
EOSINOPHIL NFR BLD AUTO: 3.7 % (ref 0.3–6.2)
ERYTHROCYTE [DISTWIDTH] IN BLOOD BY AUTOMATED COUNT: 18.7 % (ref 11.5–14.5)
FERRITIN SERPL-MCNC: 17.62 NG/ML (ref 30–400)
FOLATE SERPL-MCNC: >20 NG/ML (ref 4.78–24.2)
HCT VFR BLD AUTO: 38.5 % (ref 40.4–52.2)
HGB BLD-MCNC: 11.5 G/DL (ref 13.7–17.6)
IMM GRANULOCYTES # BLD: 0 10*3/MM3 (ref 0–0.03)
IMM GRANULOCYTES NFR BLD: 0 % (ref 0–0.5)
IRON SATN MFR SERPL: 3 % (ref 20–50)
IRON SERPL-MCNC: 22 MCG/DL (ref 59–158)
LYMPHOCYTES # BLD AUTO: 2.62 10*3/MM3 (ref 0.9–4.8)
LYMPHOCYTES NFR BLD AUTO: 34.7 % (ref 19.6–45.3)
MCH RBC QN AUTO: 22.1 PG (ref 27–32.7)
MCHC RBC AUTO-ENTMCNC: 29.9 G/DL (ref 32.6–36.4)
MCV RBC AUTO: 74 FL (ref 79.8–96.2)
MONOCYTES # BLD AUTO: 0.56 10*3/MM3 (ref 0.2–1.2)
MONOCYTES NFR BLD AUTO: 7.4 % (ref 5–12)
NEUTROPHILS # BLD AUTO: 4.04 10*3/MM3 (ref 1.9–8.1)
NEUTROPHILS NFR BLD AUTO: 53.7 % (ref 42.7–76)
NRBC BLD AUTO-RTO: 0 /100 WBC (ref 0–0)
PLATELET # BLD AUTO: 304 10*3/MM3 (ref 140–500)
RBC # BLD AUTO: 5.2 10*6/MM3 (ref 4.6–6)
TIBC SERPL-MCNC: 642 MCG/DL
UIBC SERPL-MCNC: 620 MCG/DL
VIT B12 SERPL-MCNC: 376 PG/ML (ref 211–946)
WBC # BLD AUTO: 7.54 10*3/MM3 (ref 4.5–10.7)

## 2018-01-29 PROCEDURE — 99214 OFFICE O/P EST MOD 30 MIN: CPT | Performed by: FAMILY MEDICINE

## 2018-01-29 RX ORDER — METOPROLOL TARTRATE 100 MG/1
100 TABLET ORAL EVERY 12 HOURS SCHEDULED
Qty: 180 TABLET | Refills: 1 | Status: CANCELLED | OUTPATIENT
Start: 2018-01-29

## 2018-01-29 RX ORDER — LOSARTAN POTASSIUM 50 MG/1
50 TABLET ORAL
Qty: 180 TABLET | Refills: 1 | Status: CANCELLED | OUTPATIENT
Start: 2018-01-29

## 2018-01-29 RX ORDER — SIMVASTATIN 40 MG
40 TABLET ORAL NIGHTLY
Qty: 90 TABLET | Refills: 1 | Status: CANCELLED | OUTPATIENT
Start: 2018-01-29

## 2018-01-29 RX ORDER — OMEPRAZOLE 20 MG/1
20 CAPSULE, DELAYED RELEASE ORAL DAILY
Qty: 90 CAPSULE | Refills: 1 | Status: SHIPPED | OUTPATIENT
Start: 2018-01-29 | End: 2018-06-20 | Stop reason: SDUPTHER

## 2018-01-29 RX ORDER — HYDROCHLOROTHIAZIDE 12.5 MG/1
12.5 TABLET ORAL DAILY
Qty: 90 TABLET | Refills: 1 | Status: SHIPPED | OUTPATIENT
Start: 2018-01-29 | End: 2018-06-20 | Stop reason: SDUPTHER

## 2018-01-29 RX ORDER — AMLODIPINE BESYLATE 10 MG/1
10 TABLET ORAL DAILY
Qty: 90 TABLET | Refills: 1 | Status: CANCELLED | OUTPATIENT
Start: 2018-01-29

## 2018-01-29 NOTE — PROGRESS NOTES
Subjective   Eric Dukes is a 77 y.o. male.     History of Present Illness   Chief Complaint:   Chief Complaint   Patient presents with   • Diabetes   • Hypertension   • Hyperlipidemia   • Heartburn       Eric Dukes 77 y.o. male who presents today for Medical Management of the below listed issues and medication refills. I reviewed his lab results. Hb down 11.2  Do iron, b-12 and folic acid.  Up to date colonoscopy. Discussed meds  hba1c good.  bp 140/60  Prostate exam at [facility].   Up to date diabetic eye exam   Foot exam neg.  he has a problem list of   Patient Active Problem List   Diagnosis   • Hyperlipidemia   • Essential hypertension   • Gout   • Type 2 diabetes mellitus without complication   • Gastroesophageal reflux disease without esophagitis   • History of adenomatous polyp of colon   .  Since the last visit, he has overall felt well.  he has been compliant with   Current Outpatient Prescriptions:   •  allopurinol (ZYLOPRIM) 300 MG tablet, Take 300 mg by mouth daily., Disp: , Rfl:   •  amLODIPine (NORVASC) 10 MG tablet, Take 1 tablet by mouth Daily., Disp: 90 tablet, Rfl: 1  •  aspirin 81 MG tablet, Take 81 mg by mouth daily., Disp: , Rfl:   •  Blood Glucose Monitoring Suppl (FREESTYLE LITE) device, Check blood sugar once per day, Disp: 100 each, Rfl: 3  •  FREESTYLE LITE test strip, USE ONE STRIP ONCE DAILY AS DIRECTED, Disp: 100 each, Rfl: 3  •  hydrochlorothiazide (HYDRODIURIL) 12.5 MG tablet, Take 1 tablet by mouth Daily., Disp: 90 tablet, Rfl: 1  •  losartan (COZAAR) 50 MG tablet, Take 1 tablet by mouth 2 (Two) Times a Day., Disp: 180 tablet, Rfl: 1  •  metFORMIN (GLUCOPHAGE) 500 MG tablet, Take two tablets twice a day with meals, Disp: 360 tablet, Rfl: 1  •  metoprolol tartrate (LOPRESSOR) 100 MG tablet, Take 1 tablet by mouth 2 (Two) Times a Day., Disp: 180 tablet, Rfl: 1  •  Multiple Vitamin (MULTIVITAMIN) tablet, Take 1 tablet by mouth daily., Disp: , Rfl:   •  Omega-3 Fatty Acids (FISH OIL)  "1000 MG capsule capsule, Take 1,000 mg by mouth 3 (three) times a day with meals., Disp: , Rfl:   •  omeprazole (priLOSEC) 20 MG capsule, Take 1 capsule by mouth Daily., Disp: 90 capsule, Rfl: 1  •  simvastatin (ZOCOR) 40 MG tablet, Take 1 tablet by mouth Every Night., Disp: 90 tablet, Rfl: 1.  he denies medication side effects.    All of the chronic condition(s) listed above are stable w/o issues.    /62  Pulse 63  Temp 97.6 °F (36.4 °C) (Oral)   Resp 16  Ht 171.5 cm (67.5\")  Wt 81.2 kg (179 lb)  SpO2 97%  BMI 27.62 kg/m2    Results for orders placed or performed in visit on 12/28/17   Comprehensive metabolic panel   Result Value Ref Range    Glucose 135 (H) 65 - 99 mg/dL    BUN 20 8 - 23 mg/dL    Creatinine 1.13 0.76 - 1.27 mg/dL    eGFR Non African Am 63 >60 mL/min/1.73    eGFR African Am 77 >60 mL/min/1.73    BUN/Creatinine Ratio 17.7 7.0 - 25.0    Sodium 138 136 - 145 mmol/L    Potassium 5.2 3.5 - 5.2 mmol/L    Chloride 99 98 - 107 mmol/L    Total CO2 24.6 22.0 - 29.0 mmol/L    Calcium 9.9 8.6 - 10.5 mg/dL    Total Protein 7.0 6.0 - 8.5 g/dL    Albumin 4.60 3.50 - 5.20 g/dL    Globulin 2.4 gm/dL    A/G Ratio 1.9 g/dL    Total Bilirubin 0.4 0.1 - 1.2 mg/dL    Alkaline Phosphatase 78 39 - 117 U/L    AST (SGOT) 24 1 - 40 U/L    ALT (SGPT) 22 1 - 41 U/L   Lipid panel   Result Value Ref Range    Total Cholesterol 123 0 - 200 mg/dL    Triglycerides 103 0 - 150 mg/dL    HDL Cholesterol 43 40 - 60 mg/dL    VLDL Cholesterol 20.6 5 - 40 mg/dL    LDL Cholesterol  59 0 - 100 mg/dL   TSH   Result Value Ref Range    TSH 3.010 0.270 - 4.200 mIU/mL   Hemoglobin A1c   Result Value Ref Range    Hemoglobin A1C 6.80 (H) 4.80 - 5.60 %   CBC and Differential   Result Value Ref Range    WBC 7.14 4.50 - 10.70 10*3/mm3    RBC 5.21 4.60 - 6.00 10*6/mm3    Hemoglobin 11.2 (L) 13.7 - 17.6 g/dL    Hematocrit 38.6 (L) 40.4 - 52.2 %    MCV 74.1 (L) 79.8 - 96.2 fL    MCH 21.5 (L) 27.0 - 32.7 pg    MCHC 29.0 (L) 32.6 - 36.4 g/dL "    RDW 18.2 (H) 11.5 - 14.5 %    Platelets 267 140 - 500 10*3/mm3    Neutrophil Rel % 53.4 42.7 - 76.0 %    Lymphocyte Rel % 30.1 19.6 - 45.3 %    Monocyte Rel % 11.8 5.0 - 12.0 %    Eosinophil Rel % 3.8 0.3 - 6.2 %    Basophil Rel % 0.6 0.0 - 1.5 %    Neutrophils Absolute 3.82 1.90 - 8.10 10*3/mm3    Lymphocytes Absolute 2.15 0.90 - 4.80 10*3/mm3    Monocytes Absolute 0.84 0.20 - 1.20 10*3/mm3    Eosinophils Absolute 0.27 0.00 - 0.70 10*3/mm3    Basophils Absolute 0.04 0.00 - 0.20 10*3/mm3    Immature Granulocyte Rel % 0.3 0.0 - 0.5 %    Immature Grans Absolute 0.02 0.00 - 0.03 10*3/mm3   Manual Differential   Result Value Ref Range    Differential Comment Comment     Comment Comment     Plt Comment Comment            The following portions of the patient's history were reviewed and updated as appropriate: allergies, current medications, past family history, past medical history, past social history, past surgical history and problem list.    Review of Systems   Constitutional: Negative for activity change, appetite change, chills, fatigue, fever and unexpected weight change.   HENT: Negative for congestion, ear pain, hearing loss, mouth sores, nosebleeds, rhinorrhea and sore throat.    Eyes: Negative for pain and visual disturbance.   Respiratory: Negative for cough, shortness of breath and wheezing.    Cardiovascular: Negative for chest pain, palpitations and leg swelling.   Gastrointestinal: Negative for abdominal distention, abdominal pain, blood in stool, constipation, diarrhea, nausea and vomiting.   Endocrine: Negative for cold intolerance and heat intolerance.   Genitourinary: Negative for difficulty urinating, discharge, dysuria, frequency, hematuria and urgency.   Musculoskeletal: Negative for back pain and joint swelling.   Skin: Negative for rash and wound.   Neurological: Negative for dizziness, weakness, numbness and headaches.   Hematological: Does not bruise/bleed easily.   Psychiatric/Behavioral:  Negative for confusion, dysphoric mood, sleep disturbance and suicidal ideas. The patient is not nervous/anxious.        Objective   Physical Exam   Constitutional: He is oriented to person, place, and time. He appears well-developed and well-nourished.   HENT:   Head: Atraumatic.   Mouth/Throat: Oropharynx is clear and moist.   Eyes: EOM are normal. Pupils are equal, round, and reactive to light.   Neck: Normal range of motion. Neck supple. No thyromegaly present.   Cardiovascular: Normal rate and regular rhythm.    Pulmonary/Chest: Effort normal and breath sounds normal.   Abdominal: Soft.   Musculoskeletal: Normal range of motion.    Eric had a diabetic foot exam performed today.   During the foot exam he had a monofilament test performed.  Neurological: He is alert and oriented to person, place, and time.   Skin: Skin is warm and dry.   Psychiatric: He has a normal mood and affect. His behavior is normal.   Nursing note and vitals reviewed.      Assessment/Plan   Eric was seen today for diabetes, hypertension, hyperlipidemia and heartburn.    Diagnoses and all orders for this visit:    Essential hypertension  -     hydrochlorothiazide (HYDRODIURIL) 12.5 MG tablet; Take 1 tablet by mouth Daily.    Type 2 diabetes mellitus without complication, without long-term current use of insulin  -     glucose blood (FREESTYLE LITE) test strip; Test blood sugar once daily  -     metFORMIN (GLUCOPHAGE) 500 MG tablet; Take two tablets twice a day with meals    Gastroesophageal reflux disease without esophagitis  -     omeprazole (priLOSEC) 20 MG capsule; Take 1 capsule by mouth Daily.    Mixed hyperlipidemia    Anemia, unspecified type  -     CBC & Differential  -     Folate  -     Vitamin B12  -     Ferritin  -     Iron Profile    Other orders  -     Cancel: simvastatin (ZOCOR) 40 MG tablet; Take 1 tablet by mouth Every Night.  -     Cancel: metoprolol tartrate (LOPRESSOR) 100 MG tablet; Take 1 tablet by mouth Every 12  (Twelve) Hours.  -     Cancel: losartan (COZAAR) 50 MG tablet; Take 1 tablet by mouth 2 (Two) Times a Day.  -     Cancel: amLODIPine (NORVASC) 10 MG tablet; Take 1 tablet by mouth Daily.

## 2018-02-12 ENCOUNTER — OFFICE VISIT (OUTPATIENT)
Dept: FAMILY MEDICINE CLINIC | Facility: CLINIC | Age: 78
End: 2018-02-12

## 2018-02-12 VITALS
BODY MASS INDEX: 27.13 KG/M2 | HEIGHT: 68 IN | OXYGEN SATURATION: 97 % | WEIGHT: 179 LBS | DIASTOLIC BLOOD PRESSURE: 58 MMHG | SYSTOLIC BLOOD PRESSURE: 142 MMHG | TEMPERATURE: 97.5 F | HEART RATE: 55 BPM

## 2018-02-12 DIAGNOSIS — D64.9 ANEMIA, UNSPECIFIED TYPE: Primary | ICD-10-CM

## 2018-02-12 PROCEDURE — 99214 OFFICE O/P EST MOD 30 MIN: CPT | Performed by: FAMILY MEDICINE

## 2018-02-12 NOTE — PROGRESS NOTES
Subjective   Eric Dukes is a 77 y.o. male.     History of Present Illness   Chief Complaint:   Chief Complaint   Patient presents with   • Anemia     follow up on labs       Eric Dukes 77 y.o. male who presents today for a lab follow up on anemia. I have reviewed his labs. He has LUX       LAST 2 HB  12 AND 11.2 AND 11.5    IRON LOW 22   FERRITIN 17.6    HE FEELS TIRED    LAST COLONOSCOPY 2 YEARS AGO  AND NORMAL, NO RECTAL BLEEDING.  HAD POLYPS IN PAST   NO NSAIDS  he has a problem list of   Patient Active Problem List   Diagnosis   • Hyperlipidemia   • Essential hypertension   • Gout   • Type 2 diabetes mellitus without complication   • Gastroesophageal reflux disease without esophagitis   • History of adenomatous polyp of colon   .  Since the last visit, he has overall felt well.  he has been compliant with   Current Outpatient Prescriptions:   •  allopurinol (ZYLOPRIM) 300 MG tablet, Take 300 mg by mouth daily., Disp: , Rfl:   •  amLODIPine (NORVASC) 10 MG tablet, Take 1 tablet by mouth Daily., Disp: 90 tablet, Rfl: 1  •  aspirin 81 MG tablet, Take 81 mg by mouth daily., Disp: , Rfl:   •  Blood Glucose Monitoring Suppl (FREESTYLE LITE) device, Check blood sugar once per day, Disp: 100 each, Rfl: 3  •  glucose blood (FREESTYLE LITE) test strip, Test blood sugar once daily, Disp: 100 each, Rfl: 3  •  hydrochlorothiazide (HYDRODIURIL) 12.5 MG tablet, Take 1 tablet by mouth Daily., Disp: 90 tablet, Rfl: 1  •  losartan (COZAAR) 50 MG tablet, Take 1 tablet by mouth 2 (Two) Times a Day., Disp: 180 tablet, Rfl: 1  •  metFORMIN (GLUCOPHAGE) 500 MG tablet, Take two tablets twice a day with meals, Disp: 360 tablet, Rfl: 1  •  metoprolol tartrate (LOPRESSOR) 100 MG tablet, Take 1 tablet by mouth 2 (Two) Times a Day., Disp: 180 tablet, Rfl: 1  •  Multiple Vitamin (MULTIVITAMIN) tablet, Take 1 tablet by mouth daily., Disp: , Rfl:   •  Omega-3 Fatty Acids (FISH OIL) 1000 MG capsule capsule, Take 1,000 mg by mouth 3  "(three) times a day with meals., Disp: , Rfl:   •  omeprazole (priLOSEC) 20 MG capsule, Take 1 capsule by mouth Daily., Disp: 90 capsule, Rfl: 1  •  simvastatin (ZOCOR) 40 MG tablet, Take 1 tablet by mouth Every Night., Disp: 90 tablet, Rfl: 1.  he denies medication side effects.    All of the chronic condition(s) listed above are stable w/o issues.    /58  Pulse 55  Temp 97.5 °F (36.4 °C) (Oral)   Ht 171.5 cm (67.52\")  Wt 81.2 kg (179 lb)  SpO2 97%  BMI 27.61 kg/m2    Results for orders placed or performed in visit on 01/29/18   Folate   Result Value Ref Range    Folate >20.00 4.78 - 24.20 ng/mL   Vitamin B12   Result Value Ref Range    Vitamin B-12 376 211 - 946 pg/mL   Ferritin   Result Value Ref Range    Ferritin 17.62 (L) 30.00 - 400.00 ng/mL   Iron Profile   Result Value Ref Range    TIBC 642 mcg/dL    UIBC 620 mcg/dL    Iron 22 (L) 59 - 158 mcg/dL    Iron Saturation 3 (L) 20 - 50 %   CBC & Differential   Result Value Ref Range    WBC 7.54 4.50 - 10.70 10*3/mm3    RBC 5.20 4.60 - 6.00 10*6/mm3    Hemoglobin 11.5 (L) 13.7 - 17.6 g/dL    Hematocrit 38.5 (L) 40.4 - 52.2 %    MCV 74.0 (L) 79.8 - 96.2 fL    MCH 22.1 (L) 27.0 - 32.7 pg    MCHC 29.9 (L) 32.6 - 36.4 g/dL    RDW 18.7 (H) 11.5 - 14.5 %    Platelets 304 140 - 500 10*3/mm3    Neutrophil Rel % 53.7 42.7 - 76.0 %    Lymphocyte Rel % 34.7 19.6 - 45.3 %    Monocyte Rel % 7.4 5.0 - 12.0 %    Eosinophil Rel % 3.7 0.3 - 6.2 %    Basophil Rel % 0.5 0.0 - 1.5 %    Neutrophils Absolute 4.04 1.90 - 8.10 10*3/mm3    Lymphocytes Absolute 2.62 0.90 - 4.80 10*3/mm3    Monocytes Absolute 0.56 0.20 - 1.20 10*3/mm3    Eosinophils Absolute 0.28 0.00 - 0.70 10*3/mm3    Basophils Absolute 0.04 0.00 - 0.20 10*3/mm3    Immature Granulocyte Rel % 0.0 0.0 - 0.5 %    Immature Grans Absolute 0.00 0.00 - 0.03 10*3/mm3    nRBC 0.0 0.0 - 0.0 /100 WBC           The following portions of the patient's history were reviewed and updated as appropriate: allergies, current " medications, past family history, past medical history, past social history, past surgical history and problem list.    Review of Systems   Constitutional: Positive for fatigue. Negative for unexpected weight change.   Cardiovascular: Negative for chest pain.   Gastrointestinal: Negative for abdominal pain.   Neurological: Negative for dizziness.       Objective   Physical Exam   Constitutional: He appears well-developed and well-nourished.   Eyes: EOM are normal. Pupils are equal, round, and reactive to light.   Neck: Normal range of motion. Neck supple.   Cardiovascular: Normal rate and regular rhythm.    Pulmonary/Chest: Effort normal and breath sounds normal.   Abdominal: Soft. Bowel sounds are normal. He exhibits no distension. There is no tenderness.   Neurological: He is alert.   Skin: Skin is warm and dry.   Psychiatric: He has a normal mood and affect. His behavior is normal.       Assessment/Plan   Eric was seen today for anemia.    Diagnoses and all orders for this visit:    Anemia, unspecified type  Comments:  chris  Orders:  -     CBC & Differential; Future  -     Ferritin; Future  -     Iron Profile; Future

## 2018-02-12 NOTE — PATIENT INSTRUCTIONS
Exercise 30 minutes most days of the week  Sleep 6-8 hours each night if possible  Low fat, low cholesterol diet   we discussed prescribed medications and how to take them   make sure you get results of any labs/studies ordered today  Low glycemic index diet     FEOSTAT OR FEOSOL 1 BID   SEE ME WITH LABS 2 MONTHS

## 2018-04-06 DIAGNOSIS — D64.9 ANEMIA, UNSPECIFIED TYPE: ICD-10-CM

## 2018-04-06 LAB
BASOPHILS # BLD AUTO: 0.02 10*3/MM3 (ref 0–0.2)
BASOPHILS NFR BLD AUTO: 0.2 % (ref 0–1.5)
EOSINOPHIL # BLD AUTO: 0.2 10*3/MM3 (ref 0–0.7)
EOSINOPHIL NFR BLD AUTO: 2.5 % (ref 0.3–6.2)
ERYTHROCYTE [DISTWIDTH] IN BLOOD BY AUTOMATED COUNT: 23 % (ref 11.5–14.5)
FERRITIN SERPL-MCNC: 36.89 NG/ML (ref 30–400)
HCT VFR BLD AUTO: 45.9 % (ref 40.4–52.2)
HGB BLD-MCNC: 14.6 G/DL (ref 13.7–17.6)
IMM GRANULOCYTES # BLD: 0 10*3/MM3 (ref 0–0.03)
IMM GRANULOCYTES NFR BLD: 0 % (ref 0–0.5)
IRON SATN MFR SERPL: 25 % (ref 20–50)
IRON SERPL-MCNC: 131 MCG/DL (ref 59–158)
LYMPHOCYTES # BLD AUTO: 2.45 10*3/MM3 (ref 0.9–4.8)
LYMPHOCYTES NFR BLD AUTO: 30.5 % (ref 19.6–45.3)
MCH RBC QN AUTO: 26.6 PG (ref 27–32.7)
MCHC RBC AUTO-ENTMCNC: 31.8 G/DL (ref 32.6–36.4)
MCV RBC AUTO: 83.6 FL (ref 79.8–96.2)
MONOCYTES # BLD AUTO: 0.76 10*3/MM3 (ref 0.2–1.2)
MONOCYTES NFR BLD AUTO: 9.5 % (ref 5–12)
NEUTROPHILS # BLD AUTO: 4.59 10*3/MM3 (ref 1.9–8.1)
NEUTROPHILS NFR BLD AUTO: 57.3 % (ref 42.7–76)
PLATELET # BLD AUTO: 240 10*3/MM3 (ref 140–500)
RBC # BLD AUTO: 5.49 10*6/MM3 (ref 4.6–6)
TIBC SERPL-MCNC: 526 MCG/DL
UIBC SERPL-MCNC: 395 MCG/DL
WBC # BLD AUTO: 8.02 10*3/MM3 (ref 4.5–10.7)

## 2018-04-17 ENCOUNTER — OFFICE VISIT (OUTPATIENT)
Dept: FAMILY MEDICINE CLINIC | Facility: CLINIC | Age: 78
End: 2018-04-17

## 2018-04-17 VITALS
RESPIRATION RATE: 16 BRPM | BODY MASS INDEX: 25.61 KG/M2 | OXYGEN SATURATION: 96 % | DIASTOLIC BLOOD PRESSURE: 60 MMHG | HEIGHT: 68 IN | SYSTOLIC BLOOD PRESSURE: 140 MMHG | WEIGHT: 169 LBS | TEMPERATURE: 97 F | HEART RATE: 57 BPM

## 2018-04-17 DIAGNOSIS — I10 ESSENTIAL HYPERTENSION: ICD-10-CM

## 2018-04-17 DIAGNOSIS — D64.9 ANEMIA, UNSPECIFIED TYPE: Primary | ICD-10-CM

## 2018-04-17 PROCEDURE — 99214 OFFICE O/P EST MOD 30 MIN: CPT | Performed by: FAMILY MEDICINE

## 2018-04-17 NOTE — PATIENT INSTRUCTIONS
Exercise 30 minutes most days of the week  Sleep 6-8 hours each night if possible  Low fat, low cholesterol diet   we discussed prescribed medications and how to take them   make sure you get results of any labs/studies ordered today  Low glycemic index diet     Sob to go home

## 2018-04-17 NOTE — PROGRESS NOTES
Subjective   Eric Dukes is a 78 y.o. male.     History of Present Illness   Chief Complaint:   Chief Complaint   Patient presents with   • Anemia   • Hypertension       Eric Dukes 78 y.o. male who presents today for a 2 month follow up regarding his anemia with Medical Management of the below listed issues. I reviewed his lab results. Hb better taking otc iron bid.  Hb up to 14.6     Last colonoscopy 2 years ago.  Less tired   Energy better    Decrease iron to 1 per day.  Take  Iron otc  One per day.  Do sob and bring back to office, see me 3 months  bp 140/60  he has a problem list of   Patient Active Problem List   Diagnosis   • Hyperlipidemia   • Essential hypertension   • Gout   • Type 2 diabetes mellitus without complication   • Gastroesophageal reflux disease without esophagitis   • History of adenomatous polyp of colon   .  Since the last visit, he has overall felt well.  he has been compliant with   Current Outpatient Prescriptions:   •  allopurinol (ZYLOPRIM) 300 MG tablet, Take 300 mg by mouth daily., Disp: , Rfl:   •  amLODIPine (NORVASC) 10 MG tablet, Take 1 tablet by mouth Daily., Disp: 90 tablet, Rfl: 1  •  aspirin 81 MG tablet, Take 81 mg by mouth daily., Disp: , Rfl:   •  Blood Glucose Monitoring Suppl (FREESTYLE LITE) device, Check blood sugar once per day, Disp: 100 each, Rfl: 3  •  glucose blood (FREESTYLE LITE) test strip, Test blood sugar once daily, Disp: 100 each, Rfl: 3  •  hydrochlorothiazide (HYDRODIURIL) 12.5 MG tablet, Take 1 tablet by mouth Daily., Disp: 90 tablet, Rfl: 1  •  losartan (COZAAR) 50 MG tablet, Take 1 tablet by mouth 2 (Two) Times a Day., Disp: 180 tablet, Rfl: 1  •  metFORMIN (GLUCOPHAGE) 500 MG tablet, Take two tablets twice a day with meals, Disp: 360 tablet, Rfl: 1  •  metoprolol tartrate (LOPRESSOR) 100 MG tablet, Take 1 tablet by mouth 2 (Two) Times a Day., Disp: 180 tablet, Rfl: 1  •  Multiple Vitamin (MULTIVITAMIN) tablet, Take 1 tablet by mouth daily.,  "Disp: , Rfl:   •  Omega-3 Fatty Acids (FISH OIL) 1000 MG capsule capsule, Take 1,000 mg by mouth 3 (three) times a day with meals., Disp: , Rfl:   •  omeprazole (priLOSEC) 20 MG capsule, Take 1 capsule by mouth Daily., Disp: 90 capsule, Rfl: 1  •  simvastatin (ZOCOR) 40 MG tablet, Take 1 tablet by mouth Every Night., Disp: 90 tablet, Rfl: 1.  he denies medication side effects.    All of the chronic condition(s) listed above are stable w/o issues.    /66   Pulse 57   Temp 97 °F (36.1 °C) (Oral)   Resp 16   Ht 171.5 cm (67.52\")   Wt 76.7 kg (169 lb)   SpO2 96%   BMI 26.06 kg/m²     Results for orders placed or performed in visit on 04/06/18   Ferritin   Result Value Ref Range    Ferritin 36.89 30.00 - 400.00 ng/mL   Iron Profile   Result Value Ref Range    TIBC 526 mcg/dL    UIBC 395 mcg/dL    Iron 131 59 - 158 mcg/dL    Iron Saturation 25 20 - 50 %   CBC & Differential   Result Value Ref Range    WBC 8.02 4.50 - 10.70 10*3/mm3    RBC 5.49 4.60 - 6.00 10*6/mm3    Hemoglobin 14.6 13.7 - 17.6 g/dL    Hematocrit 45.9 40.4 - 52.2 %    MCV 83.6 79.8 - 96.2 fL    MCH 26.6 (L) 27.0 - 32.7 pg    MCHC 31.8 (L) 32.6 - 36.4 g/dL    RDW 23.0 (H) 11.5 - 14.5 %    Platelets 240 140 - 500 10*3/mm3    Neutrophil Rel % 57.3 42.7 - 76.0 %    Lymphocyte Rel % 30.5 19.6 - 45.3 %    Monocyte Rel % 9.5 5.0 - 12.0 %    Eosinophil Rel % 2.5 0.3 - 6.2 %    Basophil Rel % 0.2 0.0 - 1.5 %    Neutrophils Absolute 4.59 1.90 - 8.10 10*3/mm3    Lymphocytes Absolute 2.45 0.90 - 4.80 10*3/mm3    Monocytes Absolute 0.76 0.20 - 1.20 10*3/mm3    Eosinophils Absolute 0.20 0.00 - 0.70 10*3/mm3    Basophils Absolute 0.02 0.00 - 0.20 10*3/mm3    Immature Granulocyte Rel % 0.0 0.0 - 0.5 %    Immature Grans Absolute 0.00 0.00 - 0.03 10*3/mm3           The following portions of the patient's history were reviewed and updated as appropriate: allergies, current medications, past family history, past medical history, past social history, past " surgical history and problem list.    Review of Systems   Constitutional: Negative for activity change, appetite change and unexpected weight change.   Eyes: Negative for visual disturbance.   Respiratory: Negative for chest tightness and shortness of breath.    Cardiovascular: Negative for chest pain and palpitations.   Endocrine: Negative for cold intolerance and heat intolerance.   Skin: Negative for color change.   Neurological: Negative for syncope and speech difficulty.   Psychiatric/Behavioral: Negative for behavioral problems, confusion and decreased concentration.       Objective   Physical Exam   Constitutional: He appears well-developed and well-nourished.   HENT:   Head: Normocephalic and atraumatic.   Eyes: EOM are normal. Pupils are equal, round, and reactive to light.   Neck: Normal range of motion.   Cardiovascular: Normal rate and regular rhythm.    Pulmonary/Chest: Effort normal and breath sounds normal.   Abdominal: Soft. He exhibits no mass. There is no tenderness. There is no guarding.   Musculoskeletal: Normal range of motion.   Neurological: He is alert.   Skin: Skin is warm.   Psychiatric: He has a normal mood and affect. His behavior is normal.   Nursing note and vitals reviewed.      Assessment/Plan   Eric was seen today for anemia and hypertension.    Diagnoses and all orders for this visit:    Anemia, unspecified type  -     Comprehensive metabolic panel; Future  -     Lipid panel; Future  -     CBC and Differential; Future  -     TSH; Future  -     Iron Profile; Future    Essential hypertension  -     Comprehensive metabolic panel; Future  -     Lipid panel; Future  -     CBC and Differential; Future  -     TSH; Future  -     Iron Profile; Future

## 2018-04-18 DIAGNOSIS — Z12.11 COLON CANCER SCREENING: Primary | ICD-10-CM

## 2018-04-18 LAB
EXPIRATION DATE: NORMAL
GASTROCULT GAST QL: NEGATIVE
Lab: NORMAL

## 2018-04-18 PROCEDURE — G0328 FECAL BLOOD SCRN IMMUNOASSAY: HCPCS | Performed by: FAMILY MEDICINE

## 2018-05-04 ENCOUNTER — OFFICE VISIT (OUTPATIENT)
Dept: FAMILY MEDICINE CLINIC | Facility: CLINIC | Age: 78
End: 2018-05-04

## 2018-05-04 VITALS
DIASTOLIC BLOOD PRESSURE: 62 MMHG | HEIGHT: 68 IN | SYSTOLIC BLOOD PRESSURE: 126 MMHG | OXYGEN SATURATION: 94 % | HEART RATE: 57 BPM | BODY MASS INDEX: 26.37 KG/M2 | RESPIRATION RATE: 16 BRPM | TEMPERATURE: 98.5 F | WEIGHT: 174 LBS

## 2018-05-04 DIAGNOSIS — E11.9 TYPE 2 DIABETES MELLITUS WITHOUT COMPLICATION, WITHOUT LONG-TERM CURRENT USE OF INSULIN (HCC): Primary | ICD-10-CM

## 2018-05-04 PROCEDURE — 99213 OFFICE O/P EST LOW 20 MIN: CPT | Performed by: FAMILY MEDICINE

## 2018-05-04 NOTE — PROGRESS NOTES
Subjective   Eric Dukes is a 78 y.o. male.     History of Present Illness   Chief Complaint:   Chief Complaint   Patient presents with   • Diabetes     Diabetic shoe form       Eric Dukes 78 y.o. male who presents today for Diabetic shoe order. Papers filled out for dr KHAN.  His papers filled out and he does need diabetic shoes, I had face to face evaluation  With patient   See form    I reviewed med list. On metformin bid.   bs 135   hba1c  6.8  he has a problem list of   Patient Active Problem List   Diagnosis   • Hyperlipidemia   • Essential hypertension   • Gout   • Type 2 diabetes mellitus without complication   • Gastroesophageal reflux disease without esophagitis   • History of adenomatous polyp of colon   .  Since the last visit, he has overall felt well.  he has been compliant with   Current Outpatient Prescriptions:   •  allopurinol (ZYLOPRIM) 300 MG tablet, Take 300 mg by mouth daily., Disp: , Rfl:   •  amLODIPine (NORVASC) 10 MG tablet, Take 1 tablet by mouth Daily., Disp: 90 tablet, Rfl: 1  •  aspirin 81 MG tablet, Take 81 mg by mouth daily., Disp: , Rfl:   •  Blood Glucose Monitoring Suppl (FREESTYLE LITE) device, Check blood sugar once per day, Disp: 100 each, Rfl: 3  •  glucose blood (FREESTYLE LITE) test strip, Test blood sugar once daily, Disp: 100 each, Rfl: 3  •  hydrochlorothiazide (HYDRODIURIL) 12.5 MG tablet, Take 1 tablet by mouth Daily., Disp: 90 tablet, Rfl: 1  •  losartan (COZAAR) 50 MG tablet, Take 1 tablet by mouth 2 (Two) Times a Day., Disp: 180 tablet, Rfl: 1  •  metFORMIN (GLUCOPHAGE) 500 MG tablet, Take two tablets twice a day with meals, Disp: 360 tablet, Rfl: 1  •  metoprolol tartrate (LOPRESSOR) 100 MG tablet, Take 1 tablet by mouth 2 (Two) Times a Day., Disp: 180 tablet, Rfl: 1  •  Multiple Vitamin (MULTIVITAMIN) tablet, Take 1 tablet by mouth daily., Disp: , Rfl:   •  Omega-3 Fatty Acids (FISH OIL) 1000 MG capsule capsule, Take 1,000 mg by mouth 3 (three) times a  "day with meals., Disp: , Rfl:   •  omeprazole (priLOSEC) 20 MG capsule, Take 1 capsule by mouth Daily., Disp: 90 capsule, Rfl: 1  •  simvastatin (ZOCOR) 40 MG tablet, Take 1 tablet by mouth Every Night., Disp: 90 tablet, Rfl: 1.  he denies medication side effects.    All of the chronic condition(s) listed above are stable w/o issues.    /62 (BP Location: Left arm, Patient Position: Sitting, Cuff Size: Adult)   Pulse 57   Temp 98.5 °F (36.9 °C) (Oral)   Resp 16   Ht 171.5 cm (67.52\")   Wt 78.9 kg (174 lb)   SpO2 94%   BMI 26.83 kg/m²     Results for orders placed or performed in visit on 04/18/18   POCT Occult blood x 3, stool   Result Value Ref Range    Occult Blood Negative Negative    Expiration Date 08/31/2018     Lot Number [id number]            The following portions of the patient's history were reviewed and updated as appropriate: allergies, current medications, past family history, past medical history, past social history, past surgical history and problem list.    Review of Systems   Eyes: Negative for pain and visual disturbance.   Respiratory: Negative for chest tightness.    Cardiovascular: Negative for chest pain.   Neurological: Negative for numbness.       Objective   Physical Exam   Constitutional: He is oriented to person, place, and time.   Musculoskeletal: Normal range of motion. He exhibits no edema, tenderness or deformity.    Eric had a diabetic foot exam performed today.   During the foot exam he had a monofilament test performed.  Neurological: He is alert and oriented to person, place, and time.   Skin: No rash noted.   Good sensation feet       Assessment/Plan   Eric was seen today for diabetes.    Diagnoses and all orders for this visit:    Type 2 diabetes mellitus without complication, without long-term current use of insulin               "

## 2018-06-17 ENCOUNTER — RESULTS ENCOUNTER (OUTPATIENT)
Dept: FAMILY MEDICINE CLINIC | Facility: CLINIC | Age: 78
End: 2018-06-17

## 2018-06-17 DIAGNOSIS — D64.9 ANEMIA, UNSPECIFIED TYPE: ICD-10-CM

## 2018-06-17 DIAGNOSIS — I10 ESSENTIAL HYPERTENSION: ICD-10-CM

## 2018-06-20 DIAGNOSIS — K21.9 GASTROESOPHAGEAL REFLUX DISEASE WITHOUT ESOPHAGITIS: ICD-10-CM

## 2018-06-20 DIAGNOSIS — I10 ESSENTIAL HYPERTENSION: ICD-10-CM

## 2018-06-20 DIAGNOSIS — E11.9 TYPE 2 DIABETES MELLITUS WITHOUT COMPLICATION, WITHOUT LONG-TERM CURRENT USE OF INSULIN (HCC): ICD-10-CM

## 2018-06-21 RX ORDER — HYDROCHLOROTHIAZIDE 12.5 MG/1
TABLET ORAL
Qty: 90 TABLET | Refills: 0 | Status: SHIPPED | OUTPATIENT
Start: 2018-06-21 | End: 2018-08-27 | Stop reason: SDUPTHER

## 2018-06-21 RX ORDER — OMEPRAZOLE 20 MG/1
CAPSULE, DELAYED RELEASE ORAL
Qty: 90 CAPSULE | Refills: 0 | Status: SHIPPED | OUTPATIENT
Start: 2018-06-21 | End: 2018-09-26 | Stop reason: SDUPTHER

## 2018-07-20 LAB
ALBUMIN SERPL-MCNC: 4.8 G/DL (ref 3.5–5.2)
ALBUMIN/GLOB SERPL: 2.4 G/DL
ALP SERPL-CCNC: 77 U/L (ref 39–117)
ALT SERPL-CCNC: 29 U/L (ref 1–41)
AST SERPL-CCNC: 26 U/L (ref 1–40)
BASOPHILS # BLD AUTO: 0.03 10*3/MM3 (ref 0–0.2)
BASOPHILS NFR BLD AUTO: 0.4 % (ref 0–1.5)
BILIRUB SERPL-MCNC: 0.5 MG/DL (ref 0.1–1.2)
BUN SERPL-MCNC: 22 MG/DL (ref 8–23)
BUN/CREAT SERPL: 20.4 (ref 7–25)
CALCIUM SERPL-MCNC: 9.3 MG/DL (ref 8.6–10.5)
CHLORIDE SERPL-SCNC: 99 MMOL/L (ref 98–107)
CHOLEST SERPL-MCNC: 119 MG/DL (ref 0–200)
CO2 SERPL-SCNC: 25.4 MMOL/L (ref 22–29)
CREAT SERPL-MCNC: 1.08 MG/DL (ref 0.76–1.27)
EOSINOPHIL # BLD AUTO: 0.21 10*3/MM3 (ref 0–0.7)
EOSINOPHIL NFR BLD AUTO: 3.1 % (ref 0.3–6.2)
ERYTHROCYTE [DISTWIDTH] IN BLOOD BY AUTOMATED COUNT: 14.8 % (ref 11.5–14.5)
GLOBULIN SER CALC-MCNC: 2 GM/DL
GLUCOSE SERPL-MCNC: 154 MG/DL (ref 65–99)
HCT VFR BLD AUTO: 45.6 % (ref 40.4–52.2)
HDLC SERPL-MCNC: 44 MG/DL (ref 40–60)
HGB BLD-MCNC: 14.8 G/DL (ref 13.7–17.6)
IMM GRANULOCYTES # BLD: 0.02 10*3/MM3 (ref 0–0.03)
IMM GRANULOCYTES NFR BLD: 0.3 % (ref 0–0.5)
IRON SATN MFR SERPL: 15 % (ref 20–50)
IRON SERPL-MCNC: 67 MCG/DL (ref 59–158)
LDLC SERPL CALC-MCNC: 48 MG/DL (ref 0–100)
LYMPHOCYTES # BLD AUTO: 1.64 10*3/MM3 (ref 0.9–4.8)
LYMPHOCYTES NFR BLD AUTO: 24.3 % (ref 19.6–45.3)
MCH RBC QN AUTO: 30.8 PG (ref 27–32.7)
MCHC RBC AUTO-ENTMCNC: 32.5 G/DL (ref 32.6–36.4)
MCV RBC AUTO: 94.8 FL (ref 79.8–96.2)
MONOCYTES # BLD AUTO: 0.6 10*3/MM3 (ref 0.2–1.2)
MONOCYTES NFR BLD AUTO: 8.9 % (ref 5–12)
NEUTROPHILS # BLD AUTO: 4.25 10*3/MM3 (ref 1.9–8.1)
NEUTROPHILS NFR BLD AUTO: 63 % (ref 42.7–76)
PLATELET # BLD AUTO: 207 10*3/MM3 (ref 140–500)
POTASSIUM SERPL-SCNC: 4.8 MMOL/L (ref 3.5–5.2)
PROT SERPL-MCNC: 6.8 G/DL (ref 6–8.5)
RBC # BLD AUTO: 4.81 10*6/MM3 (ref 4.6–6)
SODIUM SERPL-SCNC: 139 MMOL/L (ref 136–145)
TIBC SERPL-MCNC: 460 MCG/DL
TRIGL SERPL-MCNC: 135 MG/DL (ref 0–150)
TSH SERPL DL<=0.005 MIU/L-ACNC: 2.94 MIU/ML (ref 0.27–4.2)
UIBC SERPL-MCNC: 393 MCG/DL
VLDLC SERPL CALC-MCNC: 27 MG/DL (ref 5–40)
WBC # BLD AUTO: 6.75 10*3/MM3 (ref 4.5–10.7)

## 2018-07-25 ENCOUNTER — OFFICE VISIT (OUTPATIENT)
Dept: FAMILY MEDICINE CLINIC | Facility: CLINIC | Age: 78
End: 2018-07-25

## 2018-07-25 VITALS
SYSTOLIC BLOOD PRESSURE: 124 MMHG | WEIGHT: 170.4 LBS | HEIGHT: 68 IN | HEART RATE: 54 BPM | OXYGEN SATURATION: 96 % | TEMPERATURE: 97.6 F | BODY MASS INDEX: 25.82 KG/M2 | DIASTOLIC BLOOD PRESSURE: 74 MMHG

## 2018-07-25 DIAGNOSIS — Z86.010 HISTORY OF ADENOMATOUS POLYP OF COLON: ICD-10-CM

## 2018-07-25 DIAGNOSIS — K21.9 GASTROESOPHAGEAL REFLUX DISEASE WITHOUT ESOPHAGITIS: ICD-10-CM

## 2018-07-25 DIAGNOSIS — D50.9 IRON DEFICIENCY ANEMIA, UNSPECIFIED IRON DEFICIENCY ANEMIA TYPE: Primary | ICD-10-CM

## 2018-07-25 DIAGNOSIS — E11.9 TYPE 2 DIABETES MELLITUS WITHOUT COMPLICATION, WITHOUT LONG-TERM CURRENT USE OF INSULIN (HCC): ICD-10-CM

## 2018-07-25 PROCEDURE — 99214 OFFICE O/P EST MOD 30 MIN: CPT | Performed by: FAMILY MEDICINE

## 2018-07-25 NOTE — PROGRESS NOTES
Subjective   Eric Dukes is a 78 y.o. male.     History of Present Illness   Patient is here for a lab follow-up  Anemia  Hb 14.8  On iron  1 daily  Was on 2/day  Cbc good  See labs  Iron  And iron sat dropped when went from 2 to 1 iron per day will consult with [facility]bc group  The following portions of the patient's history were reviewed and updated as appropriate: allergies, current medications, past family history, past medical history, past social history, past surgical history and problem list.    Review of Systems   Constitutional: Negative.    HENT: Negative.    Eyes: Negative for blurred vision.   Respiratory: Negative.    Cardiovascular: Negative for chest pain.   Gastrointestinal: Negative for abdominal pain, GERD and indigestion.   Neurological: Negative for confusion.       Objective   Physical Exam   Constitutional: He is oriented to person, place, and time.   HENT:   Mouth/Throat: Oropharynx is clear and moist.   Eyes: Pupils are equal, round, and reactive to light.   Cardiovascular: Normal rate and regular rhythm.    Pulmonary/Chest: Effort normal.   Abdominal: Soft. Bowel sounds are normal. He exhibits no distension. There is no tenderness. There is no guarding.   Musculoskeletal: Normal range of motion.   Neurological: He is alert and oriented to person, place, and time.   Nursing note and vitals reviewed.        Assessment/Plan   Eric was seen today for hyperlipidemia.    Diagnoses and all orders for this visit:    Iron deficiency anemia, unspecified iron deficiency anemia type  -     Ambulatory Referral to Hematology    Gastroesophageal reflux disease without esophagitis    History of adenomatous polyp of colon    Type 2 diabetes mellitus without complication, without long-term current use of insulin (CMS/[facility])

## 2018-07-25 NOTE — PATIENT INSTRUCTIONS
Exercise 30 minutes most days of the week  Sleep 6-8 hours each night if possible  Low fat, low cholesterol diet   we discussed prescribed medications and how to take them   make sure you get results of any labs/studies ordered today  Low glycemic index diet   Take iron  1/day   Continue omeprazole

## 2018-07-26 LAB
HBA1C MFR BLD: 6.2 % (ref 4.8–5.6)
WRITTEN AUTHORIZATION: NORMAL

## 2018-08-08 NOTE — PROGRESS NOTES
REFERRING PROVIDER:    Eric Tang MD  [id number] [facility]  [address]  [place] [address]    REASON FOR CONSULTATION:    Iron deficiency anemia    HISTORY OF PRESENT ILLNESS:  Eric Dukes is a 78 y.o. male who is referred today for further evaluation of iron deficiency anemia.    A CBC on 2/5/2016 showed a hemoglobin of 13.7 with hematocrit 43.5%.  White cells were normal at 7.62 with platelets 251,000.    He had a routine screening colonoscopy on 1/18/2017 that showed a 6 mm polyp in the rectum.  Nonbleeding internal hemorrhoids were noted.  Otherwise the examination was normal.  He has not had an EGD or small bowel study.    Hemoglobin dropped to 12.3 as of 2/1/2017.  As of 1/18/2018 the hemoglobin was 11.2 with hematocrit 38.6 percent.  The MCV dropped to 74.1.    Iron studies on 1/29/2018 showed an iron of 22 with ferritin 17.6 with 3% iron saturation.  Microcytic anemia was present with a hemoglobin of 11.5 and MCV of 74.  He started ferrous sulfate twice daily.    By 4/6/2018 the iron was 131 with 25% iron saturation and a ferritin of 36.  On 7/20/2018 iron was 67 with 15% iron saturation.  His hemoglobin on 7/20/2018 was 14.8 with hematocrit 45.6%.  The MCV was 94.8.  White blood cells and platelets were normal.  He is now on ferrous sulfate once daily which he tolerates well.      He denies any bleeding.  He has chronic heartburn for which she takes omeprazole with improvement.  No melena.  No bright red blood per rectum.  No hemoptysis.  He denies any digestive problems.  He eats a variable diet.  No diarrhea.  No other concerns for malabsorption.  No history of gastric surgery.     He reports some mild fatigue.  He reports chronic lower urinary tract symptoms.  He otherwise feels well.  He denies craving ice.  He denies restless legs.      Past Medical History:   Diagnosis Date   • Diabetes mellitus (CMS/HCC)     Type 2   • GERD (gastroesophageal reflux disease)    • Gout    • H/O complete eye  exam 2015   • History of anemia    • Hx of colonic polyp    • Hyperlipidemia    • Hypertension    • Skin cancer        Past Surgical History:   Procedure Laterality Date   • COLONOSCOPY      dr leonila almendarez   • COLONOSCOPY N/A 2017    Procedure: COLONOSCOPY TO CECUM AND INTO TERMINAL ILEUM WITH COLD SNARE POLYPECTOMY;  Surgeon: Nba Hyman MD;  Location: [facility] ENDOSCOPY;  Service:    • COLONOSCOPY W/ POLYPECTOMY     • TONSILLECTOMY         SOCIAL HISTORY:   reports that he has quit smoking. He has never used smokeless tobacco. He reports that he drinks alcohol. Drug use questions deferred to the physician.  Drinks 2 or 3 beers.    FAMILY HISTORY:  family history includes Cancer in his other; Colon cancer in his paternal grandfather; Colon polyps in his sister; Diabetes in his other; Hypertension in his other; Stroke in his other.  Other did have cancer, the patient states in the adrenal gland and  from this at age 64.    ALLERGIES:  No Known Allergies    MEDICATIONS:  The medication list has been reviewed with the patient by the medical assistant, and the list has been updated in the electronic medical record, which I reviewed.  Medication dosages and frequencies were confirmed to be accurate.    Review of Systems   Constitutional: Positive for fatigue. Negative for appetite change, chills, fever and unexpected weight change.   HENT: Negative for congestion, dental problem, ear pain, hearing loss, mouth sores, nosebleeds, postnasal drip, rhinorrhea, tinnitus and trouble swallowing.    Eyes: Negative.    Respiratory: Negative for cough, chest tightness, shortness of breath, wheezing and stridor.    Cardiovascular: Negative for chest pain, palpitations and leg swelling.   Gastrointestinal: Negative for abdominal distention, abdominal pain, blood in stool, constipation, diarrhea, nausea and vomiting.   Endocrine: Negative.    Genitourinary: Positive for frequency. Negative for decreased urine  "volume, difficulty urinating, dysuria, flank pain, hematuria, scrotal swelling, testicular pain and urgency.   Musculoskeletal: Negative for arthralgias, back pain and joint swelling.   Allergic/Immunologic: Negative.    Neurological: Negative for dizziness, seizures, syncope, weakness, light-headedness, numbness and headaches.   Hematological: Negative for adenopathy. Does not bruise/bleed easily.   Psychiatric/Behavioral: Negative for confusion and sleep disturbance. The patient is not nervous/anxious.    All other systems reviewed and are negative.      Vitals:    08/09/18 0759   BP: 140/72   Pulse: 51   Resp: 18   Temp: 97.9 °F (36.6 °C)   TempSrc: Oral   SpO2: 96%   Weight: 77.3 kg (170 lb 6.4 oz)   Height: 172 cm (67.72\")   PainSc: 0-No pain       Physical Exam   Constitutional: He is oriented to person, place, and time. He appears well-developed and well-nourished. No distress.   HENT:   Head: Normocephalic and atraumatic.   Mouth/Throat: Oropharynx is clear and moist.   Eyes: Pupils are equal, round, and reactive to light. Conjunctivae and EOM are normal.   Neck: Normal range of motion. Neck supple. No thyromegaly present.   Cardiovascular: Normal rate, regular rhythm and normal heart sounds.  Exam reveals no gallop and no friction rub.    No murmur heard.  Pulmonary/Chest: Effort normal and breath sounds normal. No respiratory distress. He has no wheezes. He has no rales.   Abdominal: Soft. Bowel sounds are normal. He exhibits no distension and no mass. There is no tenderness. There is no rebound and no guarding.   Musculoskeletal: Normal range of motion. He exhibits no edema or tenderness.   Lymphadenopathy:     He has no cervical adenopathy.   Neurological: He is alert and oriented to person, place, and time. He has normal reflexes.   Skin: Skin is warm and dry. No rash noted. He is not diaphoretic.   Psychiatric: He has a normal mood and affect. His behavior is normal.   Nursing note and vitals " reviewed.      DIAGNOSTIC DATA:  Results for orders placed or performed in visit on 08/09/18   CBC Auto Differential   Result Value Ref Range    WBC 7.36 4.00 - 10.00 10*3/mm3    RBC 4.88 4.30 - 5.50 10*6/mm3    Hemoglobin 15.2 13.5 - 16.5 g/dL    Hematocrit 44.0 40.0 - 49.0 %    MCV 90.2 83.0 - 97.0 fL    MCH 31.1 27.0 - 33.0 pg    MCHC 34.5 32.0 - 35.0 g/dL    RDW 13.6 11.7 - 14.5 %    RDW-SD 44.6 37.0 - 49.0 fl    MPV 10.2 8.9 - 12.1 fL    Platelets 157 150 - 375 10*3/mm3    Neutrophil % 60.9 39.0 - 75.0 %    Lymphocyte % 26.6 20.0 - 49.0 %    Monocyte % 7.9 4.0 - 12.0 %    Eosinophil % 3.1 1.0 - 5.0 %    Basophil % 0.7 0.0 - 1.1 %    Immature Grans % 0.8 (H) 0.0 - 0.5 %    Neutrophils, Absolute 4.48 1.50 - 7.00 10*3/mm3    Lymphocytes, Absolute 1.96 1.00 - 3.50 10*3/mm3    Monocytes, Absolute 0.58 0.25 - 0.80 10*3/mm3    Eosinophils, Absolute 0.23 0.00 - 0.36 10*3/mm3    Basophils, Absolute 0.05 0.00 - 0.10 10*3/mm3    Immature Grans, Absolute 0.06 (H) 0.00 - 0.03 10*3/mm3    nRBC 0.0 0.0 - 0.0 /100 WBC       IMAGING:    I personally reviewed his peripheral blood smear.  I do not visualize any significant red blood cell abnormalities.  Platelets are adequate in number and normal in morphology.  White blood cells appear normal in maturation and distribution.    ASSESSMENT:  This is a 78 y.o. male with:  1.  History of iron deficiency anemia around January 2018: He had a colonoscopy in early 2017 that was unrevealing.  His hemoglobin has normalized and iron studies normalized with an over-the-counter ferrous sulfate supplement.  The etiology of his iron deficiency is unclear.  He denies any gastrointestinal issues.  He denies any bleeding.  He does drink beer on a nearly daily basis and this might be contributing.  He could have some issues with iron malabsorption.  I think for now we will get some baseline anemia labs in the office today.  I recommended that he continue his iron supplement once daily for now.  We  will obtain labs in 3 months when I see him.  If everything looks okay at that time we will recommend discontinuing the oral iron supplement and we will follow him expectantly.  If his iron levels are dropping we will send him back to Dr. Hyman to complete an evaluation of his GI tract    PLAN:   1.  Baseline iron studies, ferritin, vitamin B12, RBC folate levels in the office today.  2.  He will continue his oral iron supplement once daily for now.  3.  I will see him back in 3 months for follow-up with a CBC, reticulocyte count, ferritin, and iron studies.  Further recommendations at that time based on his labs.

## 2018-08-09 ENCOUNTER — CONSULT (OUTPATIENT)
Dept: ONCOLOGY | Facility: CLINIC | Age: 78
End: 2018-08-09

## 2018-08-09 ENCOUNTER — LAB (OUTPATIENT)
Dept: LAB | Facility: HOSPITAL | Age: 78
End: 2018-08-09

## 2018-08-09 VITALS
DIASTOLIC BLOOD PRESSURE: 72 MMHG | WEIGHT: 170.4 LBS | HEIGHT: 68 IN | TEMPERATURE: 97.9 F | BODY MASS INDEX: 25.82 KG/M2 | RESPIRATION RATE: 18 BRPM | OXYGEN SATURATION: 96 % | HEART RATE: 51 BPM | SYSTOLIC BLOOD PRESSURE: 140 MMHG

## 2018-08-09 DIAGNOSIS — Z86.2 HISTORY OF IRON DEFICIENCY ANEMIA: Primary | ICD-10-CM

## 2018-08-09 DIAGNOSIS — D50.8 OTHER IRON DEFICIENCY ANEMIA: Primary | ICD-10-CM

## 2018-08-09 LAB
BASOPHILS # BLD AUTO: 0.05 10*3/MM3 (ref 0–0.1)
BASOPHILS NFR BLD AUTO: 0.7 % (ref 0–1.1)
DEPRECATED RDW RBC AUTO: 44.6 FL (ref 37–49)
EOSINOPHIL # BLD AUTO: 0.23 10*3/MM3 (ref 0–0.36)
EOSINOPHIL NFR BLD AUTO: 3.1 % (ref 1–5)
ERYTHROCYTE [DISTWIDTH] IN BLOOD BY AUTOMATED COUNT: 13.6 % (ref 11.7–14.5)
FERRITIN SERPL-MCNC: 56.5 NG/ML (ref 30–400)
HCT VFR BLD AUTO: 44 % (ref 40–49)
HGB BLD-MCNC: 15.2 G/DL (ref 13.5–16.5)
HGB RETIC QN: 34.5 PG (ref 29.8–36.1)
IMM GRANULOCYTES # BLD: 0.06 10*3/MM3 (ref 0–0.03)
IMM GRANULOCYTES NFR BLD: 0.8 % (ref 0–0.5)
IMM RETICS NFR: 19.1 % (ref 3–15.8)
IRON 24H UR-MRATE: 109 MCG/DL (ref 59–158)
IRON SATN MFR SERPL: 23 % (ref 14–48)
LYMPHOCYTES # BLD AUTO: 1.96 10*3/MM3 (ref 1–3.5)
LYMPHOCYTES NFR BLD AUTO: 26.6 % (ref 20–49)
MCH RBC QN AUTO: 31.1 PG (ref 27–33)
MCHC RBC AUTO-ENTMCNC: 34.5 G/DL (ref 32–35)
MCV RBC AUTO: 90.2 FL (ref 83–97)
MONOCYTES # BLD AUTO: 0.58 10*3/MM3 (ref 0.25–0.8)
MONOCYTES NFR BLD AUTO: 7.9 % (ref 4–12)
NEUTROPHILS # BLD AUTO: 4.48 10*3/MM3 (ref 1.5–7)
NEUTROPHILS NFR BLD AUTO: 60.9 % (ref 39–75)
NRBC BLD MANUAL-RTO: 0 /100 WBC (ref 0–0)
PLATELET # BLD AUTO: 157 10*3/MM3 (ref 150–375)
PMV BLD AUTO: 10.2 FL (ref 8.9–12.1)
RBC # BLD AUTO: 4.88 10*6/MM3 (ref 4.3–5.5)
RETICS/RBC NFR AUTO: 2.58 % (ref 0.6–2)
TIBC SERPL-MCNC: 466 MCG/DL (ref 249–505)
TRANSFERRIN SERPL-MCNC: 333 MG/DL (ref 200–360)
VIT B12 BLD-MCNC: 357 PG/ML (ref 211–946)
WBC NRBC COR # BLD: 7.36 10*3/MM3 (ref 4–10)

## 2018-08-09 PROCEDURE — 99205 OFFICE O/P NEW HI 60 MIN: CPT | Performed by: INTERNAL MEDICINE

## 2018-08-09 PROCEDURE — 82728 ASSAY OF FERRITIN: CPT | Performed by: INTERNAL MEDICINE

## 2018-08-09 PROCEDURE — 85046 RETICYTE/HGB CONCENTRATE: CPT | Performed by: INTERNAL MEDICINE

## 2018-08-09 PROCEDURE — 84466 ASSAY OF TRANSFERRIN: CPT | Performed by: INTERNAL MEDICINE

## 2018-08-09 PROCEDURE — 83540 ASSAY OF IRON: CPT | Performed by: INTERNAL MEDICINE

## 2018-08-09 PROCEDURE — 82607 VITAMIN B-12: CPT | Performed by: INTERNAL MEDICINE

## 2018-08-10 LAB
FOLATE BLD-MCNC: >620 NG/ML
FOLATE RBC-MCNC: >1432 NG/ML
HCT VFR BLD AUTO: 43.3 % (ref 37.5–51)

## 2018-08-27 DIAGNOSIS — I10 ESSENTIAL HYPERTENSION: ICD-10-CM

## 2018-08-27 DIAGNOSIS — E11.9 TYPE 2 DIABETES MELLITUS WITHOUT COMPLICATION, WITHOUT LONG-TERM CURRENT USE OF INSULIN (HCC): ICD-10-CM

## 2018-08-28 RX ORDER — HYDROCHLOROTHIAZIDE 12.5 MG/1
TABLET ORAL
Qty: 90 TABLET | Refills: 0 | Status: SHIPPED | OUTPATIENT
Start: 2018-08-28 | End: 2019-03-27 | Stop reason: SDUPTHER

## 2018-09-26 ENCOUNTER — OFFICE VISIT (OUTPATIENT)
Dept: FAMILY MEDICINE CLINIC | Facility: CLINIC | Age: 78
End: 2018-09-26

## 2018-09-26 ENCOUNTER — RESULTS ENCOUNTER (OUTPATIENT)
Dept: FAMILY MEDICINE CLINIC | Facility: CLINIC | Age: 78
End: 2018-09-26

## 2018-09-26 VITALS
RESPIRATION RATE: 16 BRPM | DIASTOLIC BLOOD PRESSURE: 54 MMHG | SYSTOLIC BLOOD PRESSURE: 148 MMHG | OXYGEN SATURATION: 97 % | TEMPERATURE: 97.4 F | BODY MASS INDEX: 27.15 KG/M2 | WEIGHT: 173 LBS | HEIGHT: 67 IN | HEART RATE: 51 BPM

## 2018-09-26 DIAGNOSIS — M10.9 GOUT, UNSPECIFIED CAUSE, UNSPECIFIED CHRONICITY, UNSPECIFIED SITE: ICD-10-CM

## 2018-09-26 DIAGNOSIS — Z86.2 HISTORY OF IRON DEFICIENCY ANEMIA: ICD-10-CM

## 2018-09-26 DIAGNOSIS — K21.9 GASTROESOPHAGEAL REFLUX DISEASE WITHOUT ESOPHAGITIS: ICD-10-CM

## 2018-09-26 DIAGNOSIS — I10 ESSENTIAL HYPERTENSION: ICD-10-CM

## 2018-09-26 DIAGNOSIS — E11.9 TYPE 2 DIABETES MELLITUS WITHOUT COMPLICATION, WITHOUT LONG-TERM CURRENT USE OF INSULIN (HCC): ICD-10-CM

## 2018-09-26 DIAGNOSIS — I10 ESSENTIAL HYPERTENSION: Primary | ICD-10-CM

## 2018-09-26 DIAGNOSIS — R39.14 FEELING OF INCOMPLETE BLADDER EMPTYING: ICD-10-CM

## 2018-09-26 DIAGNOSIS — E78.2 MIXED HYPERLIPIDEMIA: ICD-10-CM

## 2018-09-26 PROCEDURE — 99214 OFFICE O/P EST MOD 30 MIN: CPT | Performed by: FAMILY MEDICINE

## 2018-09-26 RX ORDER — OMEPRAZOLE 20 MG/1
20 CAPSULE, DELAYED RELEASE ORAL DAILY
Qty: 90 CAPSULE | Refills: 1 | Status: SHIPPED | OUTPATIENT
Start: 2018-09-26 | End: 2019-03-27 | Stop reason: SDUPTHER

## 2018-09-26 NOTE — PROGRESS NOTES
Subjective   Chief Complaint:   Chief Complaint   Patient presents with   • Anemia   • Heartburn         History of Present Illness comes in to follow-up labs and discuss diabetes.  I refill his metformin 500 mg 2 twice a day.  He see an CBC group about anemia.  His hemoglobin was stable.  Refill his hydrochlorothiazide and metformin and omeprazole.  Gout is stable on allopurinol 300 mg a day.,  Repeat labs in 6 months.  His last hemoglobin A1c was 6.2.  He is up-to-date on his diabetic eye exam and we did a diabetic foot exam recently.  He is up-to-date on his colonoscopy also.  Reviewed his labs.            Eric Dukes 78 y.o. male who presents today for Medical Management of the below listed issues and medication refills.    ICD-10-CM ICD-9-CM   1. Essential hypertension I10 401.9   2. Gastroesophageal reflux disease without esophagitis K21.9 530.81   3. Gout, unspecified cause, unspecified chronicity, unspecified site M10.9 274.9   4. History of iron deficiency anemia Z86.2 V12.3   5. Mixed hyperlipidemia E78.2 272.2   6. Type 2 diabetes mellitus without complication, without long-term current use of insulin (CMS/HCC) E11.9 250.00        he has a problem list of   Patient Active Problem List   Diagnosis   • Hyperlipidemia   • Essential hypertension   • Gout   • Type 2 diabetes mellitus without complication (CMS/[facility])   • Gastroesophageal reflux disease without esophagitis   • History of adenomatous polyp of colon   • History of iron deficiency anemia   .  Since the last visit, he has overall felt well.  he has been compliant with   Current Outpatient Prescriptions:   •  allopurinol (ZYLOPRIM) 300 MG tablet, Take 300 mg by mouth daily., Disp: , Rfl:   •  amLODIPine (NORVASC) 10 MG tablet, Take 1 tablet by mouth Daily., Disp: 90 tablet, Rfl: 1  •  aspirin 81 MG tablet, Take 81 mg by mouth daily., Disp: , Rfl:   •  Blood Glucose Monitoring Suppl (FREESTYLE LITE) device, Check blood sugar once per day, Disp: 100  "each, Rfl: 3  •  FERROUS SULFATE ER PO, Take 18 mg by mouth Daily., Disp: , Rfl:   •  glucose blood (FREESTYLE LITE) test strip, Test blood sugar once daily, Disp: 100 each, Rfl: 3  •  hydrochlorothiazide (HYDRODIURIL) 12.5 MG tablet, TAKE 1 TABLET EVERY DAY, Disp: 90 tablet, Rfl: 0  •  losartan (COZAAR) 50 MG tablet, Take 1 tablet by mouth 2 (Two) Times a Day., Disp: 180 tablet, Rfl: 1  •  metFORMIN (GLUCOPHAGE) 500 MG tablet, TAKE 2 TABLETS TWICE DAILY WITH MEALS, Disp: 360 tablet, Rfl: 0  •  metoprolol tartrate (LOPRESSOR) 100 MG tablet, Take 1 tablet by mouth 2 (Two) Times a Day., Disp: 180 tablet, Rfl: 1  •  Multiple Vitamin (MULTIVITAMIN) tablet, Take 1 tablet by mouth daily., Disp: , Rfl:   •  Omega-3 Fatty Acids (FISH OIL) 1000 MG capsule capsule, Take 1,000 mg by mouth 3 (three) times a day with meals., Disp: , Rfl:   •  omeprazole (priLOSEC) 20 MG capsule, Take 1 capsule by mouth Daily., Disp: 90 capsule, Rfl: 1  •  simvastatin (ZOCOR) 40 MG tablet, Take 1 tablet by mouth Every Night., Disp: 90 tablet, Rfl: 1.  he denies medication side effects.    All of the chronic condition(s) listed above are stable w/o issues.    /54   Pulse 51   Temp 97.4 °F (36.3 °C) (Oral)   Resp 16   Ht 170.2 cm (67\")   Wt 78.5 kg (173 lb)   SpO2 97%   BMI 27.10 kg/m²     Results for orders placed or performed in visit on 08/09/18   Ferritin   Result Value Ref Range    Ferritin 56.50 30.00 - 400.00 ng/mL   Iron Profile   Result Value Ref Range    Iron 109 59 - 158 mcg/dL    Iron Saturation 23 14 - 48 %    Transferrin 333 200 - 360 mg/dL    TIBC 466 249 - 505 mcg/dL   Retic With IRF & RET-He   Result Value Ref Range    Immature Reticulocyte Fraction 19.1 (H) 3.0 - 15.8 %    Reticulocyte % 2.58 (H) 0.60 - 2.00 %    Reticulocyte Hgb 34.5 29.8 - 36.1 pg   Vitamin B12   Result Value Ref Range    Vitamin B-12 357 211 - 946 pg/mL   Folate RBC   Result Value Ref Range    Folate, Hemolysate >620.0 Not Estab. ng/mL    " Hematocrit 43.3 37.5 - 51.0 %    RBC Folate >1432 >498 ng/mL             The following portions of the patient's history were reviewed and updated as appropriate: allergies, current medications, past family history, past medical history, past social history, past surgical history and problem list.    Review of Systems   Constitutional: Negative for fatigue.   HENT: Negative for sinus pain and sinus pressure.    Eyes: Negative for visual disturbance.   Respiratory: Negative for apnea and chest tightness.    Cardiovascular: Negative for chest pain.   Gastrointestinal: Negative for abdominal distention and abdominal pain.   Genitourinary: Negative for dysuria.   Musculoskeletal: Negative for back pain.   Skin: Negative for rash.   Neurological: Negative for dizziness and headaches.   Psychiatric/Behavioral: Positive for confusion.       Objective   Physical Exam   Constitutional: He is oriented to person, place, and time. He appears well-developed and well-nourished.   HENT:   Head: Normocephalic.   Eyes: Pupils are equal, round, and reactive to light.   Neck: Normal range of motion.   Cardiovascular: Normal rate and regular rhythm.    Pulmonary/Chest: Effort normal and breath sounds normal.   Abdominal: Soft. Bowel sounds are normal.   Musculoskeletal: Normal range of motion.   Neurological: He is alert and oriented to person, place, and time.   Skin: Skin is warm and dry.   Psychiatric: He has a normal mood and affect. His behavior is normal.       Assessment/Plan   Eric was seen today for anemia and heartburn.    Diagnoses and all orders for this visit:    Essential hypertension    Gastroesophageal reflux disease without esophagitis  -     omeprazole (priLOSEC) 20 MG capsule; Take 1 capsule by mouth Daily.    Gout, unspecified cause, unspecified chronicity, unspecified site    History of iron deficiency anemia    Mixed hyperlipidemia    Type 2 diabetes mellitus without complication, without long-term current use of  insulin (CMS/HCC)

## 2018-10-01 ENCOUNTER — RESULTS ENCOUNTER (OUTPATIENT)
Dept: FAMILY MEDICINE CLINIC | Facility: CLINIC | Age: 78
End: 2018-10-01

## 2018-10-01 DIAGNOSIS — R39.14 FEELING OF INCOMPLETE BLADDER EMPTYING: ICD-10-CM

## 2018-10-01 DIAGNOSIS — E78.2 MIXED HYPERLIPIDEMIA: ICD-10-CM

## 2018-10-01 DIAGNOSIS — E11.9 TYPE 2 DIABETES MELLITUS WITHOUT COMPLICATION, WITHOUT LONG-TERM CURRENT USE OF INSULIN (HCC): ICD-10-CM

## 2018-10-01 DIAGNOSIS — I10 ESSENTIAL HYPERTENSION: ICD-10-CM

## 2018-10-01 DIAGNOSIS — Z86.2 HISTORY OF IRON DEFICIENCY ANEMIA: ICD-10-CM

## 2018-10-01 DIAGNOSIS — M10.9 GOUT, UNSPECIFIED CAUSE, UNSPECIFIED CHRONICITY, UNSPECIFIED SITE: ICD-10-CM

## 2018-10-01 DIAGNOSIS — K21.9 GASTROESOPHAGEAL REFLUX DISEASE WITHOUT ESOPHAGITIS: ICD-10-CM

## 2018-11-05 ENCOUNTER — OFFICE VISIT (OUTPATIENT)
Dept: ONCOLOGY | Facility: CLINIC | Age: 78
End: 2018-11-05

## 2018-11-05 ENCOUNTER — TELEPHONE (OUTPATIENT)
Dept: ONCOLOGY | Facility: HOSPITAL | Age: 78
End: 2018-11-05

## 2018-11-05 ENCOUNTER — LAB (OUTPATIENT)
Dept: LAB | Facility: HOSPITAL | Age: 78
End: 2018-11-05

## 2018-11-05 VITALS
BODY MASS INDEX: 26.52 KG/M2 | RESPIRATION RATE: 12 BRPM | OXYGEN SATURATION: 94 % | WEIGHT: 175 LBS | HEART RATE: 53 BPM | HEIGHT: 68 IN | DIASTOLIC BLOOD PRESSURE: 59 MMHG | TEMPERATURE: 97.8 F | SYSTOLIC BLOOD PRESSURE: 156 MMHG

## 2018-11-05 DIAGNOSIS — Z86.2 HISTORY OF IRON DEFICIENCY ANEMIA: ICD-10-CM

## 2018-11-05 DIAGNOSIS — Z86.2 HISTORY OF IRON DEFICIENCY ANEMIA: Primary | ICD-10-CM

## 2018-11-05 LAB
BASOPHILS # BLD AUTO: 0.03 10*3/MM3 (ref 0–0.1)
BASOPHILS NFR BLD AUTO: 0.4 % (ref 0–1.1)
DEPRECATED RDW RBC AUTO: 45 FL (ref 37–49)
EOSINOPHIL # BLD AUTO: 0.25 10*3/MM3 (ref 0–0.36)
EOSINOPHIL NFR BLD AUTO: 3.4 % (ref 1–5)
ERYTHROCYTE [DISTWIDTH] IN BLOOD BY AUTOMATED COUNT: 13.5 % (ref 11.7–14.5)
FERRITIN SERPL-MCNC: 71.3 NG/ML (ref 30–400)
HCT VFR BLD AUTO: 44 % (ref 40–49)
HGB BLD-MCNC: 14.6 G/DL (ref 13.5–16.5)
HGB RETIC QN: 34.4 PG (ref 29.8–36.1)
IMM GRANULOCYTES # BLD: 0.02 10*3/MM3 (ref 0–0.03)
IMM GRANULOCYTES NFR BLD: 0.3 % (ref 0–0.5)
IMM RETICS NFR: 13.9 % (ref 3–15.8)
IRON 24H UR-MRATE: 76 MCG/DL (ref 59–158)
IRON SATN MFR SERPL: 17 % (ref 14–48)
LYMPHOCYTES # BLD AUTO: 2.24 10*3/MM3 (ref 1–3.5)
LYMPHOCYTES NFR BLD AUTO: 30.8 % (ref 20–49)
MCH RBC QN AUTO: 30.4 PG (ref 27–33)
MCHC RBC AUTO-ENTMCNC: 33.2 G/DL (ref 32–35)
MCV RBC AUTO: 91.5 FL (ref 83–97)
MONOCYTES # BLD AUTO: 0.62 10*3/MM3 (ref 0.25–0.8)
MONOCYTES NFR BLD AUTO: 8.5 % (ref 4–12)
NEUTROPHILS # BLD AUTO: 4.12 10*3/MM3 (ref 1.5–7)
NEUTROPHILS NFR BLD AUTO: 56.6 % (ref 39–75)
NRBC BLD MANUAL-RTO: 0 /100 WBC (ref 0–0)
PLATELET # BLD AUTO: 220 10*3/MM3 (ref 150–375)
PMV BLD AUTO: 9.5 FL (ref 8.9–12.1)
RBC # BLD AUTO: 4.81 10*6/MM3 (ref 4.3–5.5)
RETICS/RBC NFR AUTO: 2.28 % (ref 0.6–2)
TIBC SERPL-MCNC: 435 MCG/DL (ref 249–505)
TRANSFERRIN SERPL-MCNC: 311 MG/DL (ref 200–360)
WBC NRBC COR # BLD: 7.28 10*3/MM3 (ref 4–10)

## 2018-11-05 PROCEDURE — 85025 COMPLETE CBC W/AUTO DIFF WBC: CPT | Performed by: INTERNAL MEDICINE

## 2018-11-05 PROCEDURE — 84466 ASSAY OF TRANSFERRIN: CPT | Performed by: INTERNAL MEDICINE

## 2018-11-05 PROCEDURE — 85046 RETICYTE/HGB CONCENTRATE: CPT | Performed by: INTERNAL MEDICINE

## 2018-11-05 PROCEDURE — 83540 ASSAY OF IRON: CPT | Performed by: INTERNAL MEDICINE

## 2018-11-05 PROCEDURE — 36415 COLL VENOUS BLD VENIPUNCTURE: CPT | Performed by: INTERNAL MEDICINE

## 2018-11-05 PROCEDURE — 99213 OFFICE O/P EST LOW 20 MIN: CPT | Performed by: INTERNAL MEDICINE

## 2018-11-05 PROCEDURE — 82728 ASSAY OF FERRITIN: CPT | Performed by: INTERNAL MEDICINE

## 2018-11-05 NOTE — TELEPHONE ENCOUNTER
Called pt per Dr. Rodriguez and informed him iron studies have improved and instructed him to stop oral iron supplement. Pt v/u. Will return as scheduled in Feb 2019.

## 2018-11-05 NOTE — TELEPHONE ENCOUNTER
----- Message from Yair Rodriguez MD sent at 11/5/2018 12:21 PM EST -----  Please call the patient regarding his result.  Please let him know that his iron studies have improved.  I recommend that he discontinue (stop) his iron supplement at this time.  I will see him back as scheduled.  Thanks,  ENMANUEL

## 2018-11-05 NOTE — PROGRESS NOTES
[facility] CBC GROUP OUTPATIENT FOLLOW UP CLINIC VISIT    REASON FOR FOLLOW-UP:    Iron deficiency anemia    HISTORY OF PRESENT ILLNESS:  Eric Dukes is a 78 y.o. male who returns today for follow up of the above issue.      He continues his iron supplement once daily which he tolerates well.  He denies any new problems today.  Energy level is reasonable.  He denies any bleeding.        HEMATOLOGIC HISTORY:  A CBC on 2/5/2016 showed a hemoglobin of 13.7 with hematocrit 43.5%.  White cells were normal at 7.62 with platelets 251,000.     He had a routine screening colonoscopy on 1/18/2017 that showed a 6 mm polyp in the rectum.  Nonbleeding internal hemorrhoids were noted.  Otherwise the examination was normal.  He has not had an EGD or small bowel study.     Hemoglobin dropped to 12.3 as of 2/1/2017.  As of 1/18/2018 the hemoglobin was 11.2 with hematocrit 38.6 percent.  The MCV dropped to 74.1.     Iron studies on 1/29/2018 showed an iron of 22 with ferritin 17.6 with 3% iron saturation.  Microcytic anemia was present with a hemoglobin of 11.5 and MCV of 74.  He started ferrous sulfate twice daily.     By 4/6/2018 the iron was 131 with 25% iron saturation and a ferritin of 36.  On 7/20/2018 iron was 67 with 15% iron saturation.  His hemoglobin on 7/20/2018 was 14.8 with hematocrit 45.6%.  The MCV was 94.8.  White blood cells and platelets were normal.  He is now on ferrous sulfate once daily which he tolerates well.       He denies any bleeding.  He has chronic heartburn for which she takes omeprazole with improvement.  No melena.  No bright red blood per rectum.  No hemoptysis.  He denies any digestive problems.  He eats a variable diet.  No diarrhea.  No other concerns for malabsorption.  No history of gastric surgery.      He reports some mild fatigue.  He reports chronic lower urinary tract symptoms.  He otherwise feels well.  He denies craving ice.  He denies restless legs.    He was seen initially on  "8/9/2018.  At that time we obtained some baseline iron studies, ferritin, vitamin B12 level, RBC folate level.  Hemoglobin was normal at 15.2.  Ferritin was adequate at 56 with 23% iron saturation.  Vitamin B12 and RBC folate levels were normal.  He was advised to continue his oral iron supplement once daily.    ALLERGIES:  No Known Allergies    MEDICATIONS:  The medication list has been reviewed with the patient by the medical assistant, and the list has been updated in the electronic medical record, which I reviewed.  Medication dosages and frequencies were confirmed to be accurate.    REVIEW OF SYSTEMS:  PAIN:  See Vital Signs below.  GENERAL:  No fevers, chills, night sweats, or unintended weight loss.  Some fatigue  SKIN:  No rashes or non-healing lesions.  HEME/LYMPH:  No abnormal bleeding.  No palpable lymphadenopathy.  EYES:  No vision changes or diplopia.  ENT:  No sore throat or difficulty swallowing.  RESPIRATORY:  No cough, shortness of breath, hemoptysis, or wheezing.  CARDIOVASCULAR:  No chest pain, palpitations, orthopnea, or dyspnea on exertion.  GASTROINTESTINAL:  No abdominal pain, nausea, vomiting, constipation, diarrhea, melena, or hematochezia.  GENITOURINARY:  No dysuria or hematuria.  Urinary frequency  MUSCULOSKELETAL:  No joint pain, swelling, or erythema.  NEUROLOGIC:  No dizziness, loss of consciousness, or seizures.  PSYCHIATRIC:  No depression, anxiety, or mood changes.    Vitals:    11/05/18 0932   BP: 156/59   Pulse: 53   Resp: 12   Temp: 97.8 °F (36.6 °C)   TempSrc: Oral   SpO2: 94%   Weight: 79.4 kg (175 lb)   Height: 172 cm (67.72\")   PainSc: 0-No pain       PHYSICAL EXAMINATION:  GENERAL:  Well-developed well-nourished male; awake, alert and oriented, in no acute distress.  SKIN:  Warm and dry, without rashes, purpura, or petechiae.  HEAD:  Normocephalic, atraumatic.  EYES:  Pupils equal, round and reactive to light.  Extraocular movements intact.  Conjunctivae normal.  EARS:  " Hearing intact.  NOSE:  Septum midline.  No excoriations or nasal discharge.  MOUTH:  No stomatitis or ulcers.  Lips are normal.  THROAT:  Oropharynx without lesions or exudates.  NECK:  Supple with good range of motion; no thyromegaly or masses; no JVD or bruits.  LYMPHATICS:  No cervical, supraclavicular, or axillary lymphadenopathy.  CHEST:  Lungs are clear to auscultation bilaterally.  No wheezes, rales, or rhonchi.  HEART:  Regular rate; normal rhythm.  No murmurs, gallops or rubs.  ABDOMEN:  Not examined today  EXTREMITIES:  No clubbing cyanosis or edema.  NEUROLOGICAL:  No focal neurologic deficits.    DIAGNOSTIC DATA:  Results for orders placed or performed in visit on 11/05/18   Retic With IRF & RET-He   Result Value Ref Range    Immature Reticulocyte Fraction 13.9 3.0 - 15.8 %    Reticulocyte % 2.28 (H) 0.60 - 2.00 %    Reticulocyte Hgb 34.4 29.8 - 36.1 pg   CBC Auto Differential   Result Value Ref Range    WBC 7.28 4.00 - 10.00 10*3/mm3    RBC 4.81 4.30 - 5.50 10*6/mm3    Hemoglobin 14.6 13.5 - 16.5 g/dL    Hematocrit 44.0 40.0 - 49.0 %    MCV 91.5 83.0 - 97.0 fL    MCH 30.4 27.0 - 33.0 pg    MCHC 33.2 32.0 - 35.0 g/dL    RDW 13.5 11.7 - 14.5 %    RDW-SD 45.0 37.0 - 49.0 fl    MPV 9.5 8.9 - 12.1 fL    Platelets 220 150 - 375 10*3/mm3    Neutrophil % 56.6 39.0 - 75.0 %    Lymphocyte % 30.8 20.0 - 49.0 %    Monocyte % 8.5 4.0 - 12.0 %    Eosinophil % 3.4 1.0 - 5.0 %    Basophil % 0.4 0.0 - 1.1 %    Immature Grans % 0.3 0.0 - 0.5 %    Neutrophils, Absolute 4.12 1.50 - 7.00 10*3/mm3    Lymphocytes, Absolute 2.24 1.00 - 3.50 10*3/mm3    Monocytes, Absolute 0.62 0.25 - 0.80 10*3/mm3    Eosinophils, Absolute 0.25 0.00 - 0.36 10*3/mm3    Basophils, Absolute 0.03 0.00 - 0.10 10*3/mm3    Immature Grans, Absolute 0.02 0.00 - 0.03 10*3/mm3    nRBC 0.0 0.0 - 0.0 /100 WBC       IMAGING:  None reviewed    ASSESSMENT:  This is a 78 y.o. male with:  1.  History of iron deficiency anemia around January 2018: He had a  colonoscopy in early 2017 that was unrevealing.  His hemoglobin has normalized and iron studies normalized with an over-the-counter ferrous sulfate supplement.  The etiology of his iron deficiency is unclear.  He denies any gastrointestinal issues.  He denies any bleeding.  He does drink beer on a nearly daily basis and this might be contributing.  He could have some issues with iron malabsorption.  He was seen initially on 8/9/2018.  At that time his hemoglobin, iron studies, ferritin, vitamin B12 level, and RBC folate levels were normal.  We recommended continuing his iron supplement once daily.    PLAN:  1.  Follow-up pending iron studies and ferritin from today  2.  For now, he will continue his iron supplement once daily but if his iron studies appear adequate today we may call him and recommend discontinuing the iron.  Regardless, we will have him follow-up in 4 months with a repeat CBC and iron studies at that time.    ADDENDUM:  Iron studies improved.  Will stop the iron supplement.

## 2019-02-20 ENCOUNTER — OFFICE VISIT (OUTPATIENT)
Dept: FAMILY MEDICINE CLINIC | Facility: CLINIC | Age: 79
End: 2019-02-20

## 2019-02-20 VITALS
RESPIRATION RATE: 16 BRPM | OXYGEN SATURATION: 96 % | DIASTOLIC BLOOD PRESSURE: 64 MMHG | WEIGHT: 173 LBS | HEIGHT: 68 IN | HEART RATE: 55 BPM | SYSTOLIC BLOOD PRESSURE: 128 MMHG | BODY MASS INDEX: 26.22 KG/M2 | TEMPERATURE: 98 F

## 2019-02-20 DIAGNOSIS — R05.8 COUGH WITH SPUTUM: Primary | ICD-10-CM

## 2019-02-20 DIAGNOSIS — J40 BRONCHITIS: ICD-10-CM

## 2019-02-20 PROCEDURE — 99214 OFFICE O/P EST MOD 30 MIN: CPT | Performed by: FAMILY MEDICINE

## 2019-02-20 PROCEDURE — 71046 X-RAY EXAM CHEST 2 VIEWS: CPT | Performed by: FAMILY MEDICINE

## 2019-02-20 RX ORDER — CEFDINIR 300 MG/1
300 CAPSULE ORAL 2 TIMES DAILY
Qty: 20 CAPSULE | Refills: 0 | Status: SHIPPED | OUTPATIENT
Start: 2019-02-20 | End: 2019-03-27

## 2019-02-20 NOTE — PROGRESS NOTES
Subjective   Chief Complaint:   Chief Complaint   Patient presents with   • URI   • Cough         History of Present Illness when started with this cough about a week ago.  It is productive of some green yellow phlegm.  He has had no fever.  On auscultation of his lungs he does have some wheezing and a few basilar rhonchi.  Temperature is 98.  His O2 sats are 96% blood pressure is 120/64 and heart rates 55 temperature again is 98 6.  On auscultation of his lungs he does have some wheezing bilaterally in both bases and a few rhonchi that clear with coughing.  But again no fever no shortness of breath he is okay sitting at rest.  Did a chest x-ray.  And chest x-ray was negative for infiltrate and compared to the chest x-ray of 3/21/2013.    Views: lateral and posterior-anterior    Relevant Clinical Issues/Diagnoses/Indications for XRay: see HPI  Clinical Findings: Chest: was negative for infiltrate, effusion, pneumothorax, or wide mediastinum    Compared with previous XRay? yes    Date of Previous Xray:March 21, 2013    Changes on current Xray? no    Eric Dukes 78 y.o. male who presents today for Medical Management of the below listed issues and medication refills.    ICD-10-CM ICD-9-CM   1. Cough with sputum R05 786.2   2. Bronchitis J40 490        he has a problem list of   Patient Active Problem List   Diagnosis   • Hyperlipidemia   • Essential hypertension   • Gout   • Type 2 diabetes mellitus without complication (CMS/HCC)   • Gastroesophageal reflux disease without esophagitis   • History of adenomatous polyp of colon   • History of iron deficiency anemia   .  Since the last visit, he has overall felt well.  he has been compliant with   Current Outpatient Medications:   •  allopurinol (ZYLOPRIM) 300 MG tablet, Take 300 mg by mouth daily., Disp: , Rfl:   •  amLODIPine (NORVASC) 10 MG tablet, Take 1 tablet by mouth Daily., Disp: 90 tablet, Rfl: 1  •  aspirin 81 MG tablet, Take 81 mg by mouth daily., Disp: ,  "Rfl:   •  Blood Glucose Monitoring Suppl (FREESTYLE LITE) device, Check blood sugar once per day, Disp: 100 each, Rfl: 3  •  FERROUS SULFATE ER PO, Take 18 mg by mouth Daily., Disp: , Rfl:   •  glucose blood (FREESTYLE LITE) test strip, Test blood sugar once daily, Disp: 100 each, Rfl: 3  •  hydrochlorothiazide (HYDRODIURIL) 12.5 MG tablet, TAKE 1 TABLET EVERY DAY, Disp: 90 tablet, Rfl: 0  •  losartan (COZAAR) 50 MG tablet, Take 1 tablet by mouth 2 (Two) Times a Day., Disp: 180 tablet, Rfl: 1  •  metFORMIN (GLUCOPHAGE) 500 MG tablet, TAKE 2 TABLETS TWICE DAILY WITH MEALS, Disp: 360 tablet, Rfl: 0  •  metoprolol tartrate (LOPRESSOR) 100 MG tablet, Take 1 tablet by mouth 2 (Two) Times a Day., Disp: 180 tablet, Rfl: 1  •  Multiple Vitamin (MULTIVITAMIN) tablet, Take 1 tablet by mouth daily., Disp: , Rfl:   •  Omega-3 Fatty Acids (FISH OIL) 1000 MG capsule capsule, Take 1,000 mg by mouth 3 (three) times a day with meals., Disp: , Rfl:   •  omeprazole (priLOSEC) 20 MG capsule, Take 1 capsule by mouth Daily., Disp: 90 capsule, Rfl: 1  •  simvastatin (ZOCOR) 40 MG tablet, Take 1 tablet by mouth Every Night., Disp: 90 tablet, Rfl: 1  •  cefdinir (OMNICEF) 300 MG capsule, Take 1 capsule by mouth 2 (Two) Times a Day., Disp: 20 capsule, Rfl: 0.  he denies medication side effects.    All of the chronic condition(s) listed above are stable w/o issues.    /64   Pulse 55   Temp 98 °F (36.7 °C)   Resp 16   Ht 172 cm (67.72\")   Wt 78.5 kg (173 lb)   SpO2 96%   BMI 26.52 kg/m²     Results for orders placed or performed in visit on 11/05/18   Ferritin   Result Value Ref Range    Ferritin 71.30 30.00 - 400.00 ng/mL   Iron Profile   Result Value Ref Range    Iron 76 59 - 158 mcg/dL    Iron Saturation 17 14 - 48 %    Transferrin 311 200 - 360 mg/dL    TIBC 435 249 - 505 mcg/dL   Retic With IRF & RET-He   Result Value Ref Range    Immature Reticulocyte Fraction 13.9 3.0 - 15.8 %    Reticulocyte % 2.28 (H) 0.60 - 2.00 %    " Reticulocyte Hgb 34.4 29.8 - 36.1 pg   CBC Auto Differential   Result Value Ref Range    WBC 7.28 4.00 - 10.00 10*3/mm3    RBC 4.81 4.30 - 5.50 10*6/mm3    Hemoglobin 14.6 13.5 - 16.5 g/dL    Hematocrit 44.0 40.0 - 49.0 %    MCV 91.5 83.0 - 97.0 fL    MCH 30.4 27.0 - 33.0 pg    MCHC 33.2 32.0 - 35.0 g/dL    RDW 13.5 11.7 - 14.5 %    RDW-SD 45.0 37.0 - 49.0 fl    MPV 9.5 8.9 - 12.1 fL    Platelets 220 150 - 375 10*3/mm3    Neutrophil % 56.6 39.0 - 75.0 %    Lymphocyte % 30.8 20.0 - 49.0 %    Monocyte % 8.5 4.0 - 12.0 %    Eosinophil % 3.4 1.0 - 5.0 %    Basophil % 0.4 0.0 - 1.1 %    Immature Grans % 0.3 0.0 - 0.5 %    Neutrophils, Absolute 4.12 1.50 - 7.00 10*3/mm3    Lymphocytes, Absolute 2.24 1.00 - 3.50 10*3/mm3    Monocytes, Absolute 0.62 0.25 - 0.80 10*3/mm3    Eosinophils, Absolute 0.25 0.00 - 0.36 10*3/mm3    Basophils, Absolute 0.03 0.00 - 0.10 10*3/mm3    Immature Grans, Absolute 0.02 0.00 - 0.03 10*3/mm3    nRBC 0.0 0.0 - 0.0 /100 WBC             The following portions of the patient's history were reviewed and updated as appropriate: allergies, current medications, past family history, past medical history, past social history, past surgical history and problem list.    Review of Systems   Constitutional: Negative for activity change, appetite change and unexpected weight change.   HENT: Positive for congestion.    Eyes: Negative for visual disturbance.   Respiratory: Positive for cough. Negative for chest tightness and shortness of breath.    Cardiovascular: Negative for chest pain and palpitations.   Gastrointestinal: Negative for abdominal pain.   Genitourinary: Negative for frequency and hematuria.   Musculoskeletal: Negative for back pain.   Skin: Negative for color change.   Neurological: Negative for dizziness, syncope and speech difficulty.   Psychiatric/Behavioral: Negative for confusion and decreased concentration.       Objective   Physical Exam    Assessment/Plan   Eric was seen today for uri  and cough.    Diagnoses and all orders for this visit:    Cough with sputum  -     XR Chest PA & Lateral    Bronchitis    Other orders  -     cefdinir (OMNICEF) 300 MG capsule; Take 1 capsule by mouth 2 (Two) Times a Day.

## 2019-02-25 ENCOUNTER — TELEPHONE (OUTPATIENT)
Dept: ONCOLOGY | Facility: CLINIC | Age: 79
End: 2019-02-25

## 2019-02-25 ENCOUNTER — LAB (OUTPATIENT)
Dept: LAB | Facility: HOSPITAL | Age: 79
End: 2019-02-25

## 2019-02-25 ENCOUNTER — OFFICE VISIT (OUTPATIENT)
Dept: ONCOLOGY | Facility: CLINIC | Age: 79
End: 2019-02-25

## 2019-02-25 VITALS
SYSTOLIC BLOOD PRESSURE: 138 MMHG | DIASTOLIC BLOOD PRESSURE: 64 MMHG | HEIGHT: 68 IN | RESPIRATION RATE: 16 BRPM | BODY MASS INDEX: 26.21 KG/M2 | OXYGEN SATURATION: 97 % | TEMPERATURE: 98.3 F | WEIGHT: 172.9 LBS | HEART RATE: 53 BPM

## 2019-02-25 DIAGNOSIS — Z86.2 HISTORY OF IRON DEFICIENCY ANEMIA: ICD-10-CM

## 2019-02-25 DIAGNOSIS — Z86.2 HISTORY OF IRON DEFICIENCY ANEMIA: Primary | ICD-10-CM

## 2019-02-25 LAB
BASOPHILS # BLD AUTO: 0.04 10*3/MM3 (ref 0–0.2)
BASOPHILS NFR BLD AUTO: 0.5 % (ref 0–1.5)
DEPRECATED RDW RBC AUTO: 44.3 FL (ref 37–54)
EOSINOPHIL # BLD AUTO: 0.19 10*3/MM3 (ref 0–0.4)
EOSINOPHIL NFR BLD AUTO: 2.6 % (ref 0.3–6.2)
ERYTHROCYTE [DISTWIDTH] IN BLOOD BY AUTOMATED COUNT: 13.3 % (ref 12.3–15.4)
FERRITIN SERPL-MCNC: 75.2 NG/ML (ref 30–400)
HCT VFR BLD AUTO: 45.7 % (ref 37.5–51)
HGB BLD-MCNC: 15.5 G/DL (ref 13–17.7)
HGB RETIC QN AUTO: 35.3 PG (ref 29.8–36.1)
IMM GRANULOCYTES # BLD AUTO: 0.03 10*3/MM3 (ref 0–0.05)
IMM GRANULOCYTES NFR BLD AUTO: 0.4 % (ref 0–0.5)
IMM RETICS NFR: 21.3 % (ref 3–15.8)
IRON 24H UR-MRATE: 107 MCG/DL (ref 59–158)
IRON SATN MFR SERPL: 26 % (ref 14–48)
LYMPHOCYTES # BLD AUTO: 2.31 10*3/MM3 (ref 0.7–3.1)
LYMPHOCYTES NFR BLD AUTO: 31.2 % (ref 19.6–45.3)
MCH RBC QN AUTO: 31 PG (ref 26.6–33)
MCHC RBC AUTO-ENTMCNC: 33.9 G/DL (ref 31.5–35.7)
MCV RBC AUTO: 91.4 FL (ref 79–97)
MONOCYTES # BLD AUTO: 0.68 10*3/MM3 (ref 0.1–0.9)
MONOCYTES NFR BLD AUTO: 9.2 % (ref 5–12)
NEUTROPHILS # BLD AUTO: 4.15 10*3/MM3 (ref 1.4–7)
NEUTROPHILS NFR BLD AUTO: 56.1 % (ref 42.7–76)
NRBC BLD AUTO-RTO: 0 /100 WBC (ref 0–0)
PLATELET # BLD AUTO: 265 10*3/MM3 (ref 140–450)
PMV BLD AUTO: 9.1 FL (ref 6–12)
RBC # BLD AUTO: 5 10*6/MM3 (ref 4.14–5.8)
RETICS/RBC NFR AUTO: 2.38 % (ref 0.5–1.5)
TIBC SERPL-MCNC: 413 MCG/DL (ref 249–505)
TRANSFERRIN SERPL-MCNC: 295 MG/DL (ref 200–360)
WBC NRBC COR # BLD: 7.4 10*3/MM3 (ref 3.4–10.8)

## 2019-02-25 PROCEDURE — 85025 COMPLETE CBC W/AUTO DIFF WBC: CPT | Performed by: INTERNAL MEDICINE

## 2019-02-25 PROCEDURE — 36415 COLL VENOUS BLD VENIPUNCTURE: CPT | Performed by: INTERNAL MEDICINE

## 2019-02-25 PROCEDURE — 99213 OFFICE O/P EST LOW 20 MIN: CPT | Performed by: INTERNAL MEDICINE

## 2019-02-25 PROCEDURE — 84466 ASSAY OF TRANSFERRIN: CPT | Performed by: INTERNAL MEDICINE

## 2019-02-25 PROCEDURE — 82728 ASSAY OF FERRITIN: CPT | Performed by: INTERNAL MEDICINE

## 2019-02-25 PROCEDURE — 85046 RETICYTE/HGB CONCENTRATE: CPT | Performed by: INTERNAL MEDICINE

## 2019-02-25 PROCEDURE — 83540 ASSAY OF IRON: CPT | Performed by: INTERNAL MEDICINE

## 2019-02-25 RX ORDER — HEPATITIS A VACCINE 1440 [IU]/ML
INJECTION, SUSPENSION INTRAMUSCULAR
COMMUNITY
Start: 2019-01-09 | End: 2019-03-27

## 2019-02-25 NOTE — TELEPHONE ENCOUNTER
----- Message from Yair Rodriguez MD sent at 2/25/2019 11:12 AM EST -----  Please call the patient regarding his result.  Please let him know that his iron levels continue to improve.  He can remain off of his iron supplement.  ENMANUEL

## 2019-02-25 NOTE — PROGRESS NOTES
[facility] OUTPATIENT FOLLOW UP CLINIC VISIT    REASON FOR FOLLOW-UP:    Iron deficiency anemia    HISTORY OF PRESENT ILLNESS:  Eric Dukes is a 78 y.o. male who returns today for follow up of the above issue.      At his last visit in November his hemoglobin normalized.  His ferritin was 71 with 17% iron saturation.  I suggested that he discontinue the iron supplement at that time, which he did.    He feels well.  He had a recent upper respiratory infection which he is recovering from.  He denies any bleeding.    HEMATOLOGIC HISTORY:  A CBC on 2/5/2016 showed a hemoglobin of 13.7 with hematocrit 43.5%.  White cells were normal at 7.62 with platelets 251,000.     He had a routine screening colonoscopy on 1/18/2017 that showed a 6 mm polyp in the rectum.  Nonbleeding internal hemorrhoids were noted.  Otherwise the examination was normal.  He has not had an EGD or small bowel study.     Hemoglobin dropped to 12.3 as of 2/1/2017.  As of 1/18/2018 the hemoglobin was 11.2 with hematocrit 38.6 percent.  The MCV dropped to 74.1.     Iron studies on 1/29/2018 showed an iron of 22 with ferritin 17.6 with 3% iron saturation.  Microcytic anemia was present with a hemoglobin of 11.5 and MCV of 74.  He started ferrous sulfate twice daily.     By 4/6/2018 the iron was 131 with 25% iron saturation and a ferritin of 36.  On 7/20/2018 iron was 67 with 15% iron saturation.  His hemoglobin on 7/20/2018 was 14.8 with hematocrit 45.6%.  The MCV was 94.8.  White blood cells and platelets were normal.  He is now on ferrous sulfate once daily which he tolerates well.       He denies any bleeding.  He has chronic heartburn for which she takes omeprazole with improvement.  No melena.  No bright red blood per rectum.  No hemoptysis.  He denies any digestive problems.  He eats a variable diet.  No diarrhea.  No other concerns for malabsorption.  No history of gastric surgery.      He reports some mild fatigue.  He reports  "chronic lower urinary tract symptoms.  He otherwise feels well.  He denies craving ice.  He denies restless legs.    He was seen initially on 8/9/2018.  At that time we obtained some baseline iron studies, ferritin, vitamin B12 level, RBC folate level.  Hemoglobin was normal at 15.2.  Ferritin was adequate at 56 with 23% iron saturation.  Vitamin B12 and RBC folate levels were normal.  He was advised to continue his oral iron supplement once daily.    Iron studies normalized on 11/5/2018 and he discontinued the iron supplement at that time.    ALLERGIES:  No Known Allergies    MEDICATIONS:  The medication list has been reviewed with the patient by the medical assistant, and the list has been updated in the electronic medical record, which I reviewed.  Medication dosages and frequencies were confirmed to be accurate.    REVIEW OF SYSTEMS:  PAIN:  See Vital Signs below.  GENERAL:  No fevers, chills, night sweats, or unintended weight loss.  Some fatigue  SKIN:  No rashes or non-healing lesions.  HEME/LYMPH:  No abnormal bleeding.  No palpable lymphadenopathy.  EYES:  No vision changes or diplopia.  ENT: Some nasal congestion  RESPIRATORY: Mild cough  CARDIOVASCULAR:  No chest pain, palpitations, orthopnea, or dyspnea on exertion.  GASTROINTESTINAL:  No abdominal pain, nausea, vomiting, constipation, diarrhea, melena, or hematochezia.  GENITOURINARY:  No dysuria or hematuria.  Urinary frequency  MUSCULOSKELETAL:  No joint pain, swelling, or erythema.  NEUROLOGIC:  No dizziness, loss of consciousness, or seizures.  PSYCHIATRIC:  No depression, anxiety, or mood changes.    Vitals:    02/25/19 0920   BP: 138/64   Pulse: 53   Resp: 16   Temp: 98.3 °F (36.8 °C)   TempSrc: Oral   SpO2: 97%   Weight: 78.4 kg (172 lb 14.4 oz)   Height: 172 cm (67.72\")   PainSc: 0-No pain  Comment: anemia       PHYSICAL EXAMINATION:  GENERAL:  Well-developed well-nourished male; awake, alert and oriented, in no acute distress.  SKIN:  Warm and " dry  HEAD:  Normocephalic, atraumatic.  EARS:  Hearing intact.  NOSE:  Septum midline.  No excoriations or nasal discharge.  MOUTH:  No stomatitis or ulcers.  Lips are normal.  THROAT:  Oropharynx without lesions or exudates.  LYMPHATICS:  No cervical, supraclavicular, or axillary lymphadenopathy.  CHEST:  Lungs are clear to auscultation bilaterally.  No wheezes, rales, or rhonchi.  HEART:  Regular rate; normal rhythm.  No murmurs, gallops or rubs.  ABDOMEN:  Not examined today  EXTREMITIES:  No clubbing cyanosis or edema.    DIAGNOSTIC DATA:  Results for orders placed or performed in visit on 02/25/19   Retic With IRF & RET-He   Result Value Ref Range    Immature Reticulocyte Fraction 21.3 (H) 3.0 - 15.8 %    Reticulocyte % 2.38 (H) 0.50 - 1.50 %    Reticulocyte Hgb 35.3 29.8 - 36.1 pg   CBC Auto Differential   Result Value Ref Range    WBC 7.40 3.40 - 10.80 10*3/mm3    RBC 5.00 4.14 - 5.80 10*6/mm3    Hemoglobin 15.5 13.0 - 17.7 g/dL    Hematocrit 45.7 37.5 - 51.0 %    MCV 91.4 79.0 - 97.0 fL    MCH 31.0 26.6 - 33.0 pg    MCHC 33.9 31.5 - 35.7 g/dL    RDW 13.3 12.3 - 15.4 %    RDW-SD 44.3 37.0 - 54.0 fl    MPV 9.1 6.0 - 12.0 fL    Platelets 265 140 - 450 10*3/mm3    Neutrophil % 56.1 42.7 - 76.0 %    Lymphocyte % 31.2 19.6 - 45.3 %    Monocyte % 9.2 5.0 - 12.0 %    Eosinophil % 2.6 0.3 - 6.2 %    Basophil % 0.5 0.0 - 1.5 %    Immature Grans % 0.4 0.0 - 0.5 %    Neutrophils, Absolute 4.15 1.40 - 7.00 10*3/mm3    Lymphocytes, Absolute 2.31 0.70 - 3.10 10*3/mm3    Monocytes, Absolute 0.68 0.10 - 0.90 10*3/mm3    Eosinophils, Absolute 0.19 0.00 - 0.40 10*3/mm3    Basophils, Absolute 0.04 0.00 - 0.20 10*3/mm3    Immature Grans, Absolute 0.03 0.00 - 0.05 10*3/mm3    nRBC 0.0 0.0 - 0.0 /100 WBC       IMAGING:  None reviewed    ASSESSMENT:  This is a 78 y.o. male with:  1.  History of iron deficiency anemia around January 2018: He had a colonoscopy in early 2017 that was unrevealing.  His hemoglobin has normalized and  iron studies normalized in November with an over-the-counter ferrous sulfate supplement.  The etiology of his iron deficiency is unclear.    His hemoglobin remains normal and his reticulated hemoglobin is normal today after being off of the iron supplements since November.  We will follow-up iron studies and ferritin from today but I anticipate they will be normal.  At this point, he can follow-up with primary care.  He will remain off his iron supplement.    PLAN:  1.  Follow-up pending iron studies and ferritin from today  2.  At this point anticipating his iron studies and ferritin remaining normal I think he can follow-up with primary care.  I can see him back as needed.

## 2019-03-21 LAB
ALBUMIN SERPL-MCNC: 4.7 G/DL (ref 3.5–5.2)
ALBUMIN/GLOB SERPL: 2.1 G/DL
ALP SERPL-CCNC: 68 U/L (ref 39–117)
ALT SERPL-CCNC: 29 U/L (ref 1–41)
AST SERPL-CCNC: 27 U/L (ref 1–40)
BASOPHILS # BLD AUTO: 0.03 10*3/MM3 (ref 0–0.2)
BASOPHILS NFR BLD AUTO: 0.4 % (ref 0–1.5)
BILIRUB SERPL-MCNC: 0.6 MG/DL (ref 0.2–1.2)
BUN SERPL-MCNC: 13 MG/DL (ref 8–23)
BUN/CREAT SERPL: 14.3 (ref 7–25)
CALCIUM SERPL-MCNC: 10 MG/DL (ref 8.6–10.5)
CHLORIDE SERPL-SCNC: 98 MMOL/L (ref 98–107)
CHOLEST SERPL-MCNC: 132 MG/DL (ref 0–200)
CO2 SERPL-SCNC: 25.2 MMOL/L (ref 22–29)
CREAT SERPL-MCNC: 0.91 MG/DL (ref 0.76–1.27)
EOSINOPHIL # BLD AUTO: 0.24 10*3/MM3 (ref 0–0.4)
EOSINOPHIL NFR BLD AUTO: 3 % (ref 0.3–6.2)
ERYTHROCYTE [DISTWIDTH] IN BLOOD BY AUTOMATED COUNT: 13.9 % (ref 12.3–15.4)
GLOBULIN SER CALC-MCNC: 2.2 GM/DL
GLUCOSE SERPL-MCNC: 142 MG/DL (ref 65–99)
HBA1C MFR BLD: 6.1 % (ref 4.8–5.6)
HCT VFR BLD AUTO: 46.5 % (ref 37.5–51)
HDLC SERPL-MCNC: 40 MG/DL (ref 40–60)
HGB BLD-MCNC: 15 G/DL (ref 13–17.7)
IMM GRANULOCYTES # BLD AUTO: 0.02 10*3/MM3 (ref 0–0.05)
IMM GRANULOCYTES NFR BLD AUTO: 0.3 % (ref 0–0.5)
LDLC SERPL CALC-MCNC: 51 MG/DL (ref 0–100)
LYMPHOCYTES # BLD AUTO: 2.13 10*3/MM3 (ref 0.7–3.1)
LYMPHOCYTES NFR BLD AUTO: 26.7 % (ref 19.6–45.3)
MCH RBC QN AUTO: 30.5 PG (ref 26.6–33)
MCHC RBC AUTO-ENTMCNC: 32.3 G/DL (ref 31.5–35.7)
MCV RBC AUTO: 94.5 FL (ref 79–97)
MONOCYTES # BLD AUTO: 0.59 10*3/MM3 (ref 0.1–0.9)
MONOCYTES NFR BLD AUTO: 7.4 % (ref 5–12)
NEUTROPHILS # BLD AUTO: 4.97 10*3/MM3 (ref 1.4–7)
NEUTROPHILS NFR BLD AUTO: 62.2 % (ref 42.7–76)
NRBC BLD AUTO-RTO: 0 /100 WBC (ref 0–0)
PLATELET # BLD AUTO: 216 10*3/MM3 (ref 140–450)
POTASSIUM SERPL-SCNC: 4.9 MMOL/L (ref 3.5–5.2)
PROT SERPL-MCNC: 6.9 G/DL (ref 6–8.5)
RBC # BLD AUTO: 4.92 10*6/MM3 (ref 4.14–5.8)
SODIUM SERPL-SCNC: 140 MMOL/L (ref 136–145)
TRIGL SERPL-MCNC: 206 MG/DL (ref 0–150)
TSH SERPL DL<=0.005 MIU/L-ACNC: 4.03 MIU/ML (ref 0.27–4.2)
VLDLC SERPL CALC-MCNC: 41.2 MG/DL (ref 5–40)
WBC # BLD AUTO: 7.98 10*3/MM3 (ref 3.4–10.8)

## 2019-03-27 ENCOUNTER — OFFICE VISIT (OUTPATIENT)
Dept: FAMILY MEDICINE CLINIC | Facility: CLINIC | Age: 79
End: 2019-03-27

## 2019-03-27 VITALS
BODY MASS INDEX: 26.37 KG/M2 | RESPIRATION RATE: 16 BRPM | DIASTOLIC BLOOD PRESSURE: 73 MMHG | TEMPERATURE: 97.5 F | HEIGHT: 68 IN | HEART RATE: 52 BPM | OXYGEN SATURATION: 97 % | WEIGHT: 174 LBS | SYSTOLIC BLOOD PRESSURE: 146 MMHG

## 2019-03-27 DIAGNOSIS — I10 ESSENTIAL HYPERTENSION: ICD-10-CM

## 2019-03-27 DIAGNOSIS — K21.9 GASTROESOPHAGEAL REFLUX DISEASE WITHOUT ESOPHAGITIS: ICD-10-CM

## 2019-03-27 DIAGNOSIS — E11.9 TYPE 2 DIABETES MELLITUS WITHOUT COMPLICATION, WITHOUT LONG-TERM CURRENT USE OF INSULIN (HCC): ICD-10-CM

## 2019-03-27 PROCEDURE — 99214 OFFICE O/P EST MOD 30 MIN: CPT | Performed by: FAMILY MEDICINE

## 2019-03-27 RX ORDER — OMEPRAZOLE 20 MG/1
20 CAPSULE, DELAYED RELEASE ORAL DAILY
Qty: 90 CAPSULE | Refills: 1 | Status: SHIPPED | OUTPATIENT
Start: 2019-03-27 | End: 2019-03-28 | Stop reason: SDUPTHER

## 2019-03-27 RX ORDER — METOPROLOL TARTRATE 100 MG/1
100 TABLET ORAL 2 TIMES DAILY
Qty: 180 TABLET | Refills: 1 | Status: SHIPPED | OUTPATIENT
Start: 2019-03-27 | End: 2019-03-28 | Stop reason: SDUPTHER

## 2019-03-27 RX ORDER — HYDROCHLOROTHIAZIDE 12.5 MG/1
12.5 TABLET ORAL DAILY
Qty: 90 TABLET | Refills: 1 | Status: SHIPPED | OUTPATIENT
Start: 2019-03-27 | End: 2019-03-28 | Stop reason: SDUPTHER

## 2019-03-27 NOTE — PROGRESS NOTES
Subjective   Chief Complaint:   Chief Complaint   Patient presents with   • Hypertension   • Hyperlipidemia   • Diabetes   • Heartburn         History of Present Illness consult from Dr. Rodriguez at [facility] and he is going to get a another CBC may be in 6 months down the road just to follow-up.  And he is off his iron now.  Did mention that he could get a upper scope and a small bowel follow-through but will do that if his CBC drops again.  Refilled all meds.  Some meds from his heart doctor and some meds [facility].  Refilled his meds that I give him today.  Checks his blood sugars at home and he gets about 128.  Going to review the labs that I got on him today and I refilled his medications that I gave him.  Sugar was 142 and his hemoglobin A1c was 6.1 and his thyroid was normal and his cholesterol tests were normal so altogether his labs are good and his hemoglobin stable   I ordered a PSA but the lab told him his insurance would not cover it so he did not get the PSA and [place].          Eric Sainiton 78 y.o. male who presents today for Medical Management of the below listed issues and medication refills.    ICD-10-CM ICD-9-CM   1. Essential hypertension I10 401.9   2. Type 2 diabetes mellitus without complication, without long-term current use of insulin (CMS/HCC) E11.9 250.00   3. Gastroesophageal reflux disease without esophagitis K21.9 530.81        he has a problem list of   Patient Active Problem List   Diagnosis   • Hyperlipidemia   • Essential hypertension   • Gout   • Type 2 diabetes mellitus without complication (CMS/HCC)   • Gastroesophageal reflux disease without esophagitis   • History of adenomatous polyp of colon   • History of iron deficiency anemia   .  Since the last visit, he has overall felt well.  he has been compliant with   Current Outpatient Medications:   •  allopurinol (ZYLOPRIM) 300 MG tablet, Take 300 mg by mouth daily., Disp: , Rfl:  "  •  amLODIPine (NORVASC) 10 MG tablet, Take 1 tablet by mouth Daily., Disp: 90 tablet, Rfl: 1  •  aspirin 81 MG tablet, Take 81 mg by mouth daily., Disp: , Rfl:   •  Blood Glucose Monitoring Suppl (FREESTYLE LITE) device, Check blood sugar once per day, Disp: 100 each, Rfl: 3  •  glucose blood (FREESTYLE LITE) test strip, Test blood sugar once daily, Disp: 100 each, Rfl: 3  •  hydrochlorothiazide (HYDRODIURIL) 12.5 MG tablet, TAKE 1 TABLET EVERY DAY, Disp: 90 tablet, Rfl: 0  •  losartan (COZAAR) 50 MG tablet, Take 1 tablet by mouth 2 (Two) Times a Day., Disp: 180 tablet, Rfl: 1  •  metFORMIN (GLUCOPHAGE) 500 MG tablet, TAKE 2 TABLETS TWICE DAILY WITH MEALS, Disp: 360 tablet, Rfl: 0  •  metoprolol tartrate (LOPRESSOR) 100 MG tablet, Take 1 tablet by mouth 2 (Two) Times a Day., Disp: 180 tablet, Rfl: 1  •  Multiple Vitamin (MULTIVITAMIN) tablet, Take 1 tablet by mouth daily., Disp: , Rfl:   •  Omega-3 Fatty Acids (FISH OIL) 1000 MG capsule capsule, Take 1,000 mg by mouth 3 (three) times a day with meals., Disp: , Rfl:   •  omeprazole (priLOSEC) 20 MG capsule, Take 1 capsule by mouth Daily., Disp: 90 capsule, Rfl: 1  •  simvastatin (ZOCOR) 40 MG tablet, Take 1 tablet by mouth Every Night., Disp: 90 tablet, Rfl: 1.  he denies medication side effects.    All of the chronic condition(s) listed above are stable w/o issues.    /73   Pulse 52   Temp 97.5 °F (36.4 °C)   Resp 16   Ht 172 cm (67.72\")   Wt 78.9 kg (174 lb)   SpO2 97%   BMI 26.68 kg/m²     Results for orders placed or performed in visit on 02/25/19   Ferritin   Result Value Ref Range    Ferritin 75.20 30.00 - 400.00 ng/mL   Iron Profile   Result Value Ref Range    Iron 107 59 - 158 mcg/dL    Iron Saturation 26 14 - 48 %    Transferrin 295 200 - 360 mg/dL    TIBC 413 249 - 505 mcg/dL   Retic With IRF & RET-He   Result Value Ref Range    Immature Reticulocyte Fraction 21.3 (H) 3.0 - 15.8 %    Reticulocyte % 2.38 (H) 0.50 - 1.50 %    Reticulocyte Hgb " 35.3 29.8 - 36.1 pg   CBC Auto Differential   Result Value Ref Range    WBC 7.40 3.40 - 10.80 10*3/mm3    RBC 5.00 4.14 - 5.80 10*6/mm3    Hemoglobin 15.5 13.0 - 17.7 g/dL    Hematocrit 45.7 37.5 - 51.0 %    MCV 91.4 79.0 - 97.0 fL    MCH 31.0 26.6 - 33.0 pg    MCHC 33.9 31.5 - 35.7 g/dL    RDW 13.3 12.3 - 15.4 %    RDW-SD 44.3 37.0 - 54.0 fl    MPV 9.1 6.0 - 12.0 fL    Platelets 265 140 - 450 10*3/mm3    Neutrophil % 56.1 42.7 - 76.0 %    Lymphocyte % 31.2 19.6 - 45.3 %    Monocyte % 9.2 5.0 - 12.0 %    Eosinophil % 2.6 0.3 - 6.2 %    Basophil % 0.5 0.0 - 1.5 %    Immature Grans % 0.4 0.0 - 0.5 %    Neutrophils, Absolute 4.15 1.40 - 7.00 10*3/mm3    Lymphocytes, Absolute 2.31 0.70 - 3.10 10*3/mm3    Monocytes, Absolute 0.68 0.10 - 0.90 10*3/mm3    Eosinophils, Absolute 0.19 0.00 - 0.40 10*3/mm3    Basophils, Absolute 0.04 0.00 - 0.20 10*3/mm3    Immature Grans, Absolute 0.03 0.00 - 0.05 10*3/mm3    nRBC 0.0 0.0 - 0.0 /100 WBC             The following portions of the patient's history were reviewed and updated as appropriate: allergies, current medications, past family history, past medical history, past social history, past surgical history and problem list.    Review of Systems   Constitutional: Negative for activity change, appetite change and unexpected weight change.   Eyes: Negative for visual disturbance.   Respiratory: Negative for chest tightness and shortness of breath.    Cardiovascular: Negative for chest pain and palpitations.   Skin: Negative for color change.   Neurological: Negative for syncope and speech difficulty.   Psychiatric/Behavioral: Negative for confusion and decreased concentration.       Objective   Physical Exam   Constitutional: He is oriented to person, place, and time. He appears well-developed and well-nourished.   HENT:   Head: Atraumatic.   Mouth/Throat: Oropharynx is clear and moist.   Eyes: EOM are normal. Pupils are equal, round, and reactive to light.   Neck: Normal range of  motion. Neck supple. No thyromegaly present.   Cardiovascular: Normal rate and regular rhythm.   Pulmonary/Chest: Effort normal and breath sounds normal.   Abdominal: Soft.   Musculoskeletal: Normal range of motion.   Neurological: He is alert and oriented to person, place, and time.   Skin: Skin is warm and dry.   Psychiatric: He has a normal mood and affect. His behavior is normal.   Nursing note and vitals reviewed.      Assessment/Plan   Eric was seen today for hypertension, hyperlipidemia, diabetes and heartburn.    Diagnoses and all orders for this visit:    Essential hypertension  -     hydrochlorothiazide (HYDRODIURIL) 12.5 MG tablet; Take 1 tablet by mouth Daily.  -     metoprolol tartrate (LOPRESSOR) 100 MG tablet; Take 1 tablet by mouth 2 (Two) Times a Day.    Type 2 diabetes mellitus without complication, without long-term current use of insulin (CMS/[facility])  -     metFORMIN (GLUCOPHAGE) 500 MG tablet; Take 1 tablet by mouth Daily With Breakfast.    Gastroesophageal reflux disease without esophagitis  -     omeprazole (priLOSEC) 20 MG capsule; Take 1 capsule by mouth Daily.

## 2019-03-28 DIAGNOSIS — E11.9 TYPE 2 DIABETES MELLITUS WITHOUT COMPLICATION, WITHOUT LONG-TERM CURRENT USE OF INSULIN (HCC): ICD-10-CM

## 2019-03-28 DIAGNOSIS — I10 ESSENTIAL HYPERTENSION: ICD-10-CM

## 2019-03-28 DIAGNOSIS — K21.9 GASTROESOPHAGEAL REFLUX DISEASE WITHOUT ESOPHAGITIS: ICD-10-CM

## 2019-03-28 RX ORDER — OMEPRAZOLE 20 MG/1
20 CAPSULE, DELAYED RELEASE ORAL DAILY
Qty: 90 CAPSULE | Refills: 1 | Status: SHIPPED | OUTPATIENT
Start: 2019-03-28 | End: 2019-04-01 | Stop reason: SDUPTHER

## 2019-03-28 RX ORDER — METOPROLOL TARTRATE 100 MG/1
100 TABLET ORAL 2 TIMES DAILY
Qty: 180 TABLET | Refills: 1 | Status: SHIPPED | OUTPATIENT
Start: 2019-03-28 | End: 2019-04-01 | Stop reason: SDUPTHER

## 2019-03-28 RX ORDER — HYDROCHLOROTHIAZIDE 12.5 MG/1
12.5 TABLET ORAL DAILY
Qty: 90 TABLET | Refills: 1 | Status: SHIPPED | OUTPATIENT
Start: 2019-03-28 | End: 2019-04-01 | Stop reason: SDUPTHER

## 2019-03-29 RX ORDER — LOSARTAN POTASSIUM 50 MG/1
50 TABLET ORAL DAILY
Qty: 180 TABLET | Refills: 1 | Status: SHIPPED | OUTPATIENT
Start: 2019-03-29 | End: 2019-04-01 | Stop reason: SDUPTHER

## 2019-04-01 DIAGNOSIS — E11.9 TYPE 2 DIABETES MELLITUS WITHOUT COMPLICATION, WITHOUT LONG-TERM CURRENT USE OF INSULIN (HCC): ICD-10-CM

## 2019-04-01 DIAGNOSIS — I10 ESSENTIAL HYPERTENSION: ICD-10-CM

## 2019-04-01 DIAGNOSIS — K21.9 GASTROESOPHAGEAL REFLUX DISEASE WITHOUT ESOPHAGITIS: ICD-10-CM

## 2019-04-01 RX ORDER — LOSARTAN POTASSIUM 50 MG/1
50 TABLET ORAL DAILY
Qty: 180 TABLET | Refills: 1 | Status: SHIPPED | OUTPATIENT
Start: 2019-04-01

## 2019-04-01 RX ORDER — METOPROLOL TARTRATE 100 MG/1
100 TABLET ORAL 2 TIMES DAILY
Qty: 180 TABLET | Refills: 1 | Status: SHIPPED | OUTPATIENT
Start: 2019-04-01

## 2019-04-01 RX ORDER — OMEPRAZOLE 20 MG/1
20 CAPSULE, DELAYED RELEASE ORAL DAILY
Qty: 90 CAPSULE | Refills: 1 | Status: SHIPPED | OUTPATIENT
Start: 2019-04-01 | End: 2019-09-25 | Stop reason: SDUPTHER

## 2019-04-01 RX ORDER — HYDROCHLOROTHIAZIDE 12.5 MG/1
12.5 TABLET ORAL DAILY
Qty: 90 TABLET | Refills: 1 | Status: SHIPPED | OUTPATIENT
Start: 2019-04-01 | End: 2019-09-25 | Stop reason: SDUPTHER

## 2019-04-14 ENCOUNTER — OFFICE VISIT (OUTPATIENT)
Dept: RETAIL CLINIC | Facility: CLINIC | Age: 79
End: 2019-04-14

## 2019-04-14 VITALS
RESPIRATION RATE: 18 BRPM | HEART RATE: 68 BPM | TEMPERATURE: 98 F | SYSTOLIC BLOOD PRESSURE: 130 MMHG | OXYGEN SATURATION: 95 % | DIASTOLIC BLOOD PRESSURE: 62 MMHG

## 2019-04-14 DIAGNOSIS — J40 BRONCHITIS: Primary | ICD-10-CM

## 2019-04-14 DIAGNOSIS — J01.40 ACUTE PANSINUSITIS, RECURRENCE NOT SPECIFIED: ICD-10-CM

## 2019-04-14 PROCEDURE — 99213 OFFICE O/P EST LOW 20 MIN: CPT | Performed by: NURSE PRACTITIONER

## 2019-04-14 RX ORDER — METHYLPREDNISOLONE 4 MG/1
TABLET ORAL
Qty: 21 EACH | Refills: 0 | Status: SHIPPED | OUTPATIENT
Start: 2019-04-14 | End: 2020-08-26

## 2019-04-14 RX ORDER — AZITHROMYCIN 250 MG/1
TABLET, FILM COATED ORAL
Qty: 6 TABLET | Refills: 0 | Status: SHIPPED | OUTPATIENT
Start: 2019-04-14 | End: 2020-08-26

## 2019-04-14 NOTE — PROGRESS NOTES
Subjective   Eric Dukes is a 78 y.o. male.     URI    This is a new problem. The current episode started 1 to 4 weeks ago (about 2 weeks). The problem has been gradually worsening. The maximum temperature recorded prior to his arrival was 100.4 - 100.9 F. Associated symptoms include congestion, coughing, headaches, rhinorrhea, sneezing, a sore throat and wheezing. Pertinent negatives include no nausea or vomiting. Treatments tried: robitussin. The treatment provided no relief.   Cough   Associated symptoms include headaches, rhinorrhea, a sore throat and wheezing. Nothing aggravates the symptoms. There is no history of asthma or COPD.        The following portions of the patient's history were reviewed and updated as appropriate: allergies, current medications, past family history, past medical history, past social history, past surgical history and problem list.    Review of Systems   HENT: Positive for congestion, rhinorrhea, sneezing and sore throat.    Respiratory: Positive for cough and wheezing.    Cardiovascular: Negative.    Gastrointestinal: Negative for nausea and vomiting.   Musculoskeletal: Negative.    Skin: Negative.    Neurological: Positive for headache.       Objective   Physical Exam   Constitutional: He is cooperative. No distress.   HENT:   Head: Normocephalic.   Right Ear: Hearing, external ear and ear canal normal. A middle ear effusion is present.   Left Ear: Hearing, external ear and ear canal normal. A middle ear effusion is present.   Nose: Mucosal edema present. Right sinus exhibits maxillary sinus tenderness. Left sinus exhibits maxillary sinus tenderness.   Mouth/Throat: Posterior oropharyngeal edema and posterior oropharyngeal erythema present.   Eyes: Conjunctivae, EOM and lids are normal. Pupils are equal, round, and reactive to light.   Neck: Trachea normal and full passive range of motion without pain.   Cardiovascular: Normal rate, regular rhythm and normal pulses.    Pulmonary/Chest: Effort normal. He has wheezes in the right upper field, the right middle field, the right lower field, the left upper field, the left middle field and the left lower field. He has rhonchi in the right lower field and the left lower field.   Lymphadenopathy:     He has cervical adenopathy.        Right cervical: Superficial cervical adenopathy present.        Left cervical: Superficial cervical adenopathy present.   Neurological: He is alert.   Skin: Skin is warm. Capillary refill takes less than 2 seconds.   Psychiatric: He has a normal mood and affect. His speech is normal and behavior is normal.   Vitals reviewed.        Assessment/Plan   Eric was seen today for uri and cough.    Diagnoses and all orders for this visit:    Bronchitis    Acute pansinusitis, recurrence not specified    Other orders  -     methylPREDNISolone (MEDROL, EARL,) 4 MG tablet; Take as directed on package instructions.  -     azithromycin (ZITHROMAX Z-EARL) 250 MG tablet; Take 2 tablets the first day, then 1 tablet daily for 4 days.

## 2019-09-13 ENCOUNTER — TELEPHONE (OUTPATIENT)
Dept: FAMILY MEDICINE CLINIC | Facility: CLINIC | Age: 79
End: 2019-09-13

## 2019-09-13 DIAGNOSIS — E11.9 TYPE 2 DIABETES MELLITUS WITHOUT COMPLICATION, WITHOUT LONG-TERM CURRENT USE OF INSULIN (HCC): ICD-10-CM

## 2019-09-16 ENCOUNTER — TELEPHONE (OUTPATIENT)
Dept: FAMILY MEDICINE CLINIC | Facility: CLINIC | Age: 79
End: 2019-09-16

## 2019-09-16 DIAGNOSIS — E11.9 TYPE 2 DIABETES MELLITUS WITHOUT COMPLICATION, WITHOUT LONG-TERM CURRENT USE OF INSULIN (HCC): ICD-10-CM

## 2019-09-16 NOTE — TELEPHONE ENCOUNTER
Called pt back to confirm dose - directions in chart are 1 tab with breakfast daily.  Pt states that he has been taking 2 tabs with breakfast and 2 tabs with dinner for years.

## 2019-09-17 NOTE — TELEPHONE ENCOUNTER
See directions in chart,   Metformin   500mg   Take  2 tablets po BID WITH MEALS   360  WITH 1 REFILL

## 2019-09-25 ENCOUNTER — OFFICE VISIT (OUTPATIENT)
Dept: FAMILY MEDICINE CLINIC | Facility: CLINIC | Age: 79
End: 2019-09-25

## 2019-09-25 VITALS
TEMPERATURE: 97.9 F | HEART RATE: 55 BPM | RESPIRATION RATE: 16 BRPM | DIASTOLIC BLOOD PRESSURE: 74 MMHG | WEIGHT: 171 LBS | SYSTOLIC BLOOD PRESSURE: 163 MMHG | OXYGEN SATURATION: 95 % | BODY MASS INDEX: 25.91 KG/M2 | HEIGHT: 68 IN

## 2019-09-25 DIAGNOSIS — I10 ESSENTIAL HYPERTENSION: ICD-10-CM

## 2019-09-25 DIAGNOSIS — K21.9 GASTROESOPHAGEAL REFLUX DISEASE WITHOUT ESOPHAGITIS: ICD-10-CM

## 2019-09-25 DIAGNOSIS — E11.9 TYPE 2 DIABETES MELLITUS WITHOUT COMPLICATION, WITHOUT LONG-TERM CURRENT USE OF INSULIN (HCC): ICD-10-CM

## 2019-09-25 PROCEDURE — 99214 OFFICE O/P EST MOD 30 MIN: CPT | Performed by: FAMILY MEDICINE

## 2019-09-25 RX ORDER — HYDROCHLOROTHIAZIDE 12.5 MG/1
12.5 TABLET ORAL DAILY
Qty: 90 TABLET | Refills: 1 | Status: SHIPPED | OUTPATIENT
Start: 2019-09-25 | End: 2020-02-18

## 2019-09-25 RX ORDER — OMEPRAZOLE 20 MG/1
20 CAPSULE, DELAYED RELEASE ORAL DAILY
Qty: 90 CAPSULE | Refills: 1 | Status: SHIPPED | OUTPATIENT
Start: 2019-09-25 | End: 2020-02-18

## 2020-02-17 DIAGNOSIS — K21.9 GASTROESOPHAGEAL REFLUX DISEASE WITHOUT ESOPHAGITIS: ICD-10-CM

## 2020-02-17 DIAGNOSIS — I10 ESSENTIAL HYPERTENSION: ICD-10-CM

## 2020-02-17 DIAGNOSIS — E11.9 TYPE 2 DIABETES MELLITUS WITHOUT COMPLICATION, WITHOUT LONG-TERM CURRENT USE OF INSULIN (HCC): ICD-10-CM

## 2020-02-18 RX ORDER — OMEPRAZOLE 20 MG/1
CAPSULE, DELAYED RELEASE ORAL
Qty: 90 CAPSULE | Refills: 0 | Status: SHIPPED | OUTPATIENT
Start: 2020-02-18 | End: 2020-08-26 | Stop reason: SDUPTHER

## 2020-02-18 RX ORDER — HYDROCHLOROTHIAZIDE 12.5 MG/1
TABLET ORAL
Qty: 90 TABLET | Refills: 0 | Status: SHIPPED | OUTPATIENT
Start: 2020-02-18 | End: 2020-08-26 | Stop reason: SDUPTHER

## 2020-04-08 DIAGNOSIS — I10 ESSENTIAL HYPERTENSION: ICD-10-CM

## 2020-04-08 DIAGNOSIS — K21.9 GASTROESOPHAGEAL REFLUX DISEASE WITHOUT ESOPHAGITIS: ICD-10-CM

## 2020-04-08 RX ORDER — HYDROCHLOROTHIAZIDE 12.5 MG/1
TABLET ORAL
Qty: 90 TABLET | Refills: 0 | OUTPATIENT
Start: 2020-04-08

## 2020-04-08 RX ORDER — OMEPRAZOLE 20 MG/1
CAPSULE, DELAYED RELEASE ORAL
Qty: 90 CAPSULE | Refills: 0 | OUTPATIENT
Start: 2020-04-08

## 2020-05-29 DIAGNOSIS — E11.9 TYPE 2 DIABETES MELLITUS WITHOUT COMPLICATION, WITHOUT LONG-TERM CURRENT USE OF INSULIN (HCC): ICD-10-CM

## 2020-08-26 ENCOUNTER — OFFICE VISIT (OUTPATIENT)
Dept: FAMILY MEDICINE CLINIC | Facility: CLINIC | Age: 80
End: 2020-08-26

## 2020-08-26 VITALS
TEMPERATURE: 97.3 F | HEART RATE: 54 BPM | RESPIRATION RATE: 16 BRPM | WEIGHT: 172 LBS | SYSTOLIC BLOOD PRESSURE: 127 MMHG | BODY MASS INDEX: 26.07 KG/M2 | HEIGHT: 68 IN | OXYGEN SATURATION: 98 % | DIASTOLIC BLOOD PRESSURE: 62 MMHG

## 2020-08-26 DIAGNOSIS — I10 ESSENTIAL HYPERTENSION: ICD-10-CM

## 2020-08-26 DIAGNOSIS — E78.2 MIXED HYPERLIPIDEMIA: ICD-10-CM

## 2020-08-26 DIAGNOSIS — E11.9 TYPE 2 DIABETES MELLITUS WITHOUT COMPLICATION, WITHOUT LONG-TERM CURRENT USE OF INSULIN (HCC): ICD-10-CM

## 2020-08-26 DIAGNOSIS — K21.9 GASTROESOPHAGEAL REFLUX DISEASE WITHOUT ESOPHAGITIS: ICD-10-CM

## 2020-08-26 PROBLEM — R80.3 BENCE JONES PROTEINURIA: Status: ACTIVE | Noted: 2018-09-13

## 2020-08-26 PROBLEM — I12.9 HYPERTENSIVE CHRONIC KIDNEY DISEASE WITH STAGE 1 THROUGH STAGE 4 CHRONIC KIDNEY DISEASE, OR UNSPECIFIED CHRONIC KIDNEY DISEASE: Status: ACTIVE | Noted: 2018-09-13

## 2020-08-26 PROBLEM — M10.9 GOUT: Status: ACTIVE | Noted: 2018-09-13

## 2020-08-26 PROBLEM — E87.5 HYPERKALEMIA: Status: ACTIVE | Noted: 2017-09-08

## 2020-08-26 PROBLEM — E83.52 HYPERCALCEMIA: Status: ACTIVE | Noted: 2019-09-06

## 2020-08-26 PROCEDURE — 99214 OFFICE O/P EST MOD 30 MIN: CPT | Performed by: FAMILY MEDICINE

## 2020-08-26 RX ORDER — OMEPRAZOLE 20 MG/1
20 CAPSULE, DELAYED RELEASE ORAL DAILY
Qty: 90 CAPSULE | Refills: 1 | Status: SHIPPED | OUTPATIENT
Start: 2020-08-26 | End: 2021-01-13 | Stop reason: SDUPTHER

## 2020-08-26 RX ORDER — HYDROCHLOROTHIAZIDE 12.5 MG/1
12.5 TABLET ORAL DAILY
Qty: 90 TABLET | Refills: 1 | Status: SHIPPED | OUTPATIENT
Start: 2020-08-26 | End: 2021-01-13 | Stop reason: SDUPTHER

## 2020-08-26 NOTE — PATIENT INSTRUCTIONS
Food Choices for Gastroesophageal Reflux Disease, Adult  When you have gastroesophageal reflux disease (GERD), the foods you eat and your eating habits are very important. Choosing the right foods can help ease the discomfort of GERD. Consider working with a diet and nutrition specialist (dietitian) to help you make healthy food choices.  What general guidelines should I follow?    Eating plan  · Choose healthy foods low in fat, such as fruits, vegetables, whole grains, low-fat dairy products, and lean meat, fish, and poultry.  · Eat frequent, small meals instead of three large meals each day. Eat your meals slowly, in a relaxed setting. Avoid bending over or lying down until 2-3 hours after eating.  · Limit high-fat foods such as fatty meats or fried foods.  · Limit your intake of oils, butter, and shortening to less than 8 teaspoons each day.  · Avoid the following:  ? Foods that cause symptoms. These may be different for different people. Keep a food diary to keep track of foods that cause symptoms.  ? Alcohol.  ? Drinking large amounts of liquid with meals.  ? Eating meals during the 2-3 hours before bed.  · Cook foods using methods other than frying. This may include baking, grilling, or broiling.  Lifestyle  · Maintain a healthy weight. Ask your health care provider what weight is healthy for you. If you need to lose weight, work with your health care provider to do so safely.  · Exercise for at least 30 minutes on 5 or more days each week, or as told by your health care provider.  · Avoid wearing clothes that fit tightly around your waist and chest.  · Do not use any products that contain nicotine or tobacco, such as cigarettes and e-cigarettes. If you need help quitting, ask your health care provider.  · Sleep with the head of your bed raised. Use a wedge under the mattress or blocks under the bed frame to raise the head of the bed.  What foods are not recommended?  The items listed may not be a complete  list. Talk with your dietitian about what dietary choices are best for you.  Grains  Pastries or quick breads with added fat. French toast.  Vegetables  Deep fried vegetables. French fries. Any vegetables prepared with added fat. Any vegetables that cause symptoms. For some people this may include tomatoes and tomato products, chili peppers, onions and garlic, and horseradish.  Fruits  Any fruits prepared with added fat. Any fruits that cause symptoms. For some people this may include citrus fruits, such as oranges, grapefruit, pineapple, and marcello.  Meats and other protein foods  High-fat meats, such as fatty beef or pork, hot dogs, ribs, ham, sausage, salami and bailey. Fried meat or protein, including fried fish and fried chicken. Nuts and nut butters.  Dairy  Whole milk and chocolate milk. Sour cream. Cream. Ice cream. Cream cheese. Milk shakes.  Beverages  Coffee and tea, with or without caffeine. Carbonated beverages. Sodas. Energy drinks. Fruit juice made with acidic fruits (such as orange or grapefruit). Tomato juice. Alcoholic drinks.  Fats and oils  Butter. Margarine. Shortening. Ghee.  Sweets and desserts  Chocolate and cocoa. Donuts.  Seasoning and other foods  Pepper. Peppermint and spearmint. Any condiments, herbs, or seasonings that cause symptoms. For some people, this may include matute, hot sauce, or vinegar-based salad dressings.  Summary  · When you have gastroesophageal reflux disease (GERD), food and lifestyle choices are very important to help ease the discomfort of GERD.  · Eat frequent, small meals instead of three large meals each day. Eat your meals slowly, in a relaxed setting. Avoid bending over or lying down until 2-3 hours after eating.  · Limit high-fat foods such as fatty meat or fried foods.  This information is not intended to replace advice given to you by your health care provider. Make sure you discuss any questions you have with your health care provider.  Document Released:  12/18/2006 Document Revised: 04/09/2020 Document Reviewed: 12/19/2017  [employer] Patient Education © 2020 [employer]  Diabetes Mellitus and Nutrition, Adult  When you have diabetes (diabetes mellitus), it is very important to have healthy eating habits because your blood sugar (glucose) levels are greatly affected by what you eat and drink. Eating healthy foods in the appropriate amounts, at about the same times every day, can help you:  · Control your blood glucose.  · Lower your risk of heart disease.  · Improve your blood pressure.  · Reach or maintain a healthy weight.  Every person with diabetes is different, and each person has different needs for a meal plan. Your health care provider may recommend that you work with a diet and nutrition specialist (dietitian) to make a meal plan that is best for you. Your meal plan may vary depending on factors such as:  · The calories you need.  · The medicines you take.  · Your weight.  · Your blood glucose, blood pressure, and cholesterol levels.  · Your activity level.  · Other health conditions you have, such as heart or kidney disease.  How do carbohydrates affect me?  Carbohydrates, also called carbs, affect your blood glucose level more than any other type of food. Eating carbs naturally raises the amount of glucose in your blood. Carb counting is a method for keeping track of how many carbs you eat. Counting carbs is important to keep your blood glucose at a healthy level, especially if you use insulin or take certain oral diabetes medicines.  It is important to know how many carbs you can safely have in each meal. This is different for every person. Your dietitian can help you calculate how many carbs you should have at each meal and for each snack.  Foods that contain carbs include:  · Bread, cereal, rice, pasta, and crackers.  · Potatoes and corn.  · Peas, beans, and lentils.  · Milk and yogurt.  · Fruit and juice.  · Desserts, such as cakes, cookies, ice  "cream, and candy.  How does alcohol affect me?  Alcohol can cause a sudden decrease in blood glucose (hypoglycemia), especially if you use insulin or take certain oral diabetes medicines. Hypoglycemia can be a life-threatening condition. Symptoms of hypoglycemia (sleepiness, dizziness, and confusion) are similar to symptoms of having too much alcohol.  If your health care provider says that alcohol is safe for you, follow these guidelines:  · Limit alcohol intake to no more than 1 drink per day for nonpregnant women and 2 drinks per day for men. One drink equals 12 oz of beer, 5 oz of wine, or 1½ oz of hard liquor.  · Do not drink on an empty stomach.  · Keep yourself hydrated with water, diet soda, or unsweetened iced tea.  · Keep in mind that regular soda, juice, and other mixers may contain a lot of sugar and must be counted as carbs.  What are tips for following this plan?    Reading food labels  · Start by checking the serving size on the \"Nutrition Facts\" label of packaged foods and drinks. The amount of calories, carbs, fats, and other nutrients listed on the label is based on one serving of the item. Many items contain more than one serving per package.  · Check the total grams (g) of carbs in one serving. You can calculate the number of servings of carbs in one serving by dividing the total carbs by 15. For example, if a food has 30 g of total carbs, it would be equal to 2 servings of carbs.  · Check the number of grams (g) of saturated and trans fats in one serving. Choose foods that have low or no amount of these fats.  · Check the number of milligrams (mg) of salt (sodium) in one serving. Most people should limit total sodium intake to less than 2,300 mg per day.  · Always check the nutrition information of foods labeled as \"low-fat\" or \"nonfat\". These foods may be higher in added sugar or refined carbs and should be avoided.  · Talk to your dietitian to identify your daily goals for nutrients listed on " the label.  Shopping  · Avoid buying canned, premade, or processed foods. These foods tend to be high in fat, sodium, and added sugar.  · Shop around the outside edge of the grocery store. This includes fresh fruits and vegetables, bulk grains, fresh meats, and fresh dairy.  Cooking  · Use low-heat cooking methods, such as baking, instead of high-heat cooking methods like deep frying.  · Cook using healthy oils, such as olive, canola, or sunflower oil.  · Avoid cooking with butter, cream, or high-fat meats.  Meal planning  · Eat meals and snacks regularly, preferably at the same times every day. Avoid going long periods of time without eating.  · Eat foods high in fiber, such as fresh fruits, vegetables, beans, and whole grains. Talk to your dietitian about how many servings of carbs you can eat at each meal.  · Eat 4-6 ounces (oz) of lean protein each day, such as lean meat, chicken, fish, eggs, or tofu. One oz of lean protein is equal to:  ? 1 oz of meat, chicken, or fish.  ? 1 egg.  ? ¼ cup of tofu.  · Eat some foods each day that contain healthy fats, such as avocado, nuts, seeds, and fish.  Lifestyle  · Check your blood glucose regularly.  · Exercise regularly as told by your health care provider. This may include:  ? 150 minutes of moderate-intensity or vigorous-intensity exercise each week. This could be brisk walking, biking, or water aerobics.  ? Stretching and doing strength exercises, such as yoga or weightlifting, at least 2 times a week.  · Take medicines as told by your health care provider.  · Do not use any products that contain nicotine or tobacco, such as cigarettes and e-cigarettes. If you need help quitting, ask your health care provider.  · Work with a counselor or diabetes educator to identify strategies to manage stress and any emotional and social challenges.  Questions to ask a health care provider  · Do I need to meet with a diabetes educator?  · Do I need to meet with a dietitian?  · What  number can I call if I have questions?  · When are the best times to check my blood glucose?  Where to find more information:  · American Diabetes Association: diabetes.org  · Academy of Nutrition and Dietetics: www.eatright.org  · National Powell of Diabetes and Digestive and Kidney Diseases (NIH): www.niddk.nih.gov  Summary  · A healthy meal plan will help you control your blood glucose and maintain a healthy lifestyle.  · Working with a diet and nutrition specialist (dietitian) can help you make a meal plan that is best for you.  · Keep in mind that carbohydrates (carbs) and alcohol have immediate effects on your blood glucose levels. It is important to count carbs and to use alcohol carefully.  This information is not intended to replace advice given to you by your health care provider. Make sure you discuss any questions you have with your health care provider.  Document Released: 09/14/2006 Document Revised: 11/30/2018 Document Reviewed: 01/22/2018  [employer] Patient Education © 2020 [employer]  Hypertension, Adult  Hypertension is another name for high blood pressure. High blood pressure forces your heart to work harder to pump blood. This can cause problems over time.  There are two numbers in a blood pressure reading. There is a top number (systolic) over a bottom number (diastolic). It is best to have a blood pressure that is below 120/80. Healthy choices can help lower your blood pressure, or you may need medicine to help lower it.  What are the causes?  The cause of this condition is not known. Some conditions may be related to high blood pressure.  What increases the risk?  · Smoking.  · Having type 2 diabetes mellitus, high cholesterol, or both.  · Not getting enough exercise or physical activity.  · Being overweight.  · Having too much fat, sugar, calories, or salt (sodium) in your diet.  · Drinking too much alcohol.  · Having long-term (chronic) kidney disease.  · Having a family history of high  blood pressure.  · Age. Risk increases with age.  · Race. You may be at higher risk if you are .  · Gender. Men are at higher risk than women before age 45. After age 65, women are at higher risk than men.  · Having obstructive sleep apnea.  · Stress.  What are the signs or symptoms?  · High blood pressure may not cause symptoms. Very high blood pressure (hypertensive crisis) may cause:  ? Headache.  ? Feelings of worry or nervousness (anxiety).  ? Shortness of breath.  ? Nosebleed.  ? A feeling of being sick to your stomach (nausea).  ? Throwing up (vomiting).  ? Changes in how you see.  ? Very bad chest pain.  ? Seizures.  How is this treated?  · This condition is treated by making healthy lifestyle changes, such as:  ? Eating healthy foods.  ? Exercising more.  ? Drinking less alcohol.  · Your health care provider may prescribe medicine if lifestyle changes are not enough to get your blood pressure under control, and if:  ? Your top number is above 130.  ? Your bottom number is above 80.  · Your personal target blood pressure may vary.  Follow these instructions at home:  Eating and drinking    · If told, follow the DASH eating plan. To follow this plan:  ? Fill one half of your plate at each meal with fruits and vegetables.  ? Fill one fourth of your plate at each meal with whole grains. Whole grains include whole-wheat pasta, brown rice, and whole-grain bread.  ? Eat or drink low-fat dairy products, such as skim milk or low-fat yogurt.  ? Fill one fourth of your plate at each meal with low-fat (lean) proteins. Low-fat proteins include fish, chicken without skin, eggs, beans, and tofu.  ? Avoid fatty meat, cured and processed meat, or chicken with skin.  ? Avoid pre-made or processed food.  · Eat less than 1,500 mg of salt each day.  · Do not drink alcohol if:  ? Your doctor tells you not to drink.  ? You are pregnant, may be pregnant, or are planning to become pregnant.  · If you drink  alcohol:  ? Limit how much you use to:  § 0-1 drink a day for women.  § 0-2 drinks a day for men.  ? Be aware of how much alcohol is in your drink. In the [place] one drink equals one 12 oz bottle of beer (355 mL), one 5 oz glass of wine (148 mL), or one 1½ oz glass of hard liquor (44 mL).  Lifestyle    · Work with your doctor to stay at a healthy weight or to lose weight. Ask your doctor what the best weight is for you.  · Get at least 30 minutes of exercise most days of the week. This may include walking, swimming, or biking.  · Get at least 30 minutes of exercise that strengthens your muscles (resistance exercise) at least 3 days a week. This may include lifting weights or doing Pilates.  · Do not use any products that contain nicotine or tobacco, such as cigarettes, e-cigarettes, and chewing tobacco. If you need help quitting, ask your doctor.  · Check your blood pressure at home as told by your doctor.  · Keep all follow-up visits as told by your doctor. This is important.  Medicines  · Take over-the-counter and prescription medicines only as told by your doctor. Follow directions carefully.  · Do not skip doses of blood pressure medicine. The medicine does not work as well if you skip doses. Skipping doses also puts you at risk for problems.  · Ask your doctor about side effects or reactions to medicines that you should watch for.  Contact a doctor if you:  · Think you are having a reaction to the medicine you are taking.  · Have headaches that keep coming back (recurring).  · Feel dizzy.  · Have swelling in your ankles.  · Have trouble with your vision.  Get help right away if you:  · Get a very bad headache.  · Start to feel mixed up (confused).  · Feel weak or numb.  · Feel faint.  · Have very bad pain in your:  ? Chest.  ? Belly (abdomen).  · Throw up more than once.  · Have trouble breathing.  Summary  · Hypertension is another name for high blood pressure.  · High blood pressure forces your heart to work  harder to pump blood.  · For most people, a normal blood pressure is less than 120/80.  · Making healthy choices can help lower blood pressure. If your blood pressure does not get lower with healthy choices, you may need to take medicine.  This information is not intended to replace advice given to you by your health care provider. Make sure you discuss any questions you have with your health care provider.  Document Released: 06/05/2009 Document Revised: 08/28/2019 Document Reviewed: 08/28/2019  [employer] Patient Education © 2020 [employer]

## 2020-08-26 NOTE — PROGRESS NOTES
Subjective   Chief Complaint:   Chief Complaint   Patient presents with   • Hypertension   • Diabetes   • Heartburn         History of Present Illness no gout attacks.   This dose of allopurinol has not had any gout attacks he sees Dr. Cooper .    metformin 500 mg 2 twice daily and hydrochlorothiazide 12.5 mg 1 a day and Prilosec 20 mg 1 a day.  If he needs 3 medications and he does well on them.  I want to do a foot exam today.  The diabetic foot exam today.  [facility] and is getting bring me a copy of his diabetic lab works [facility].   And diabetic foot exam negative I will review the labs when he brings him back to me a minute continue the same meds that I gave him namely the metformin 500 mg 2 twice daily and hydrochlorothiazide 12.5 mg 1 a day and the Prilosec 20 mg 1 a day and he set up for a colonoscopy sometime down the road at [facility].  Hatters eyes nose and throat exams negative chest is clear heart regular sinus rhythm and my diabetic foot exam is negative   bs  130        Past Medical History:   Diagnosis Date   • Diabetes mellitus (CMS/HCC)     Type 2   • GERD (gastroesophageal reflux disease)    • Gout    • H/O complete eye exam 09/2015   • History of anemia    • Hx of colonic polyp    • Hyperlipidemia    • Hypertension    • Skin cancer         Eric Dueks 80 y.o. male who presents today for Medical Management of the below listed issues and medication refills.    ICD-10-CM ICD-9-CM   1. Mixed hyperlipidemia E78.2 272.2   2. Gastroesophageal reflux disease without esophagitis K21.9 530.81   3. Essential hypertension I10 401.9   4. Type 2 diabetes mellitus without complication, without long-term current use of insulin (CMS/HCC) E11.9 250.00        he has a problem list of   Patient Active Problem List   Diagnosis   • Hyperlipidemia   • Essential hypertension   • Gout   • Type 2 diabetes mellitus without complication (CMS/HCC)   • Gastroesophageal reflux disease without  "esophagitis   • History of adenomatous polyp of colon   • History of iron deficiency anemia   • Bence Burger proteinuria   • Chronic kidney disease, stage 2 (mild)   • Hypercalcemia   • Hyperkalemia   • Essential (primary) hypertension   • Hypertensive chronic kidney disease with stage 1 through stage 4 chronic kidney disease, or unspecified chronic kidney disease   • Gout   .    he has been compliant with   Current Outpatient Medications:   •  allopurinol (ZYLOPRIM) 300 MG tablet, Take 300 mg by mouth daily., Disp: , Rfl:   •  amLODIPine (NORVASC) 10 MG tablet, Take 1 tablet by mouth Daily., Disp: 90 tablet, Rfl: 1  •  aspirin 81 MG tablet, Take 81 mg by mouth daily., Disp: , Rfl:   •  Blood Glucose Monitoring Suppl (FREESTYLE LITE) device, Check blood sugar once per day, Disp: 100 each, Rfl: 3  •  glucose blood (FREESTYLE LITE) test strip, Test blood sugar once daily, Disp: 100 each, Rfl: 3  •  hydroCHLOROthiazide (HYDRODIURIL) 12.5 MG tablet, Take 1 tablet by mouth Daily., Disp: 90 tablet, Rfl: 1  •  losartan (COZAAR) 50 MG tablet, Take 1 tablet by mouth Daily., Disp: 180 tablet, Rfl: 1  •  metFORMIN (GLUCOPHAGE) 500 MG tablet, Take 2 tablets by mouth 2 (Two) Times a Day With Meals., Disp: 360 tablet, Rfl: 1  •  metoprolol tartrate (LOPRESSOR) 100 MG tablet, Take 1 tablet by mouth 2 (Two) Times a Day., Disp: 180 tablet, Rfl: 1  •  Multiple Vitamin (MULTIVITAMIN) tablet, Take 1 tablet by mouth daily., Disp: , Rfl:   •  Omega-3 Fatty Acids (FISH OIL) 1000 MG capsule capsule, Take 1,000 mg by mouth 3 (three) times a day with meals., Disp: , Rfl:   •  omeprazole (priLOSEC) 20 MG capsule, Take 1 capsule by mouth Daily., Disp: 90 capsule, Rfl: 1  •  simvastatin (ZOCOR) 40 MG tablet, Take 1 tablet by mouth Every Night., Disp: 90 tablet, Rfl: 1.  he denies medication side effects.        /62   Pulse 54   Temp 97.3 °F (36.3 °C)   Resp 16   Ht 172 cm (67.72\")   Wt 78 kg (172 lb)   SpO2 98%   BMI 26.37 kg/m² "     Results for orders placed or performed in visit on 02/25/19   Ferritin   Result Value Ref Range    Ferritin 75.20 30.00 - 400.00 ng/mL   Iron Profile   Result Value Ref Range    Iron 107 59 - 158 mcg/dL    Iron Saturation 26 14 - 48 %    Transferrin 295 200 - 360 mg/dL    TIBC 413 249 - 505 mcg/dL   Retic With IRF & RET-He   Result Value Ref Range    Immature Reticulocyte Fraction 21.3 (H) 3.0 - 15.8 %    Reticulocyte % 2.38 (H) 0.50 - 1.50 %    Reticulocyte Hgb 35.3 29.8 - 36.1 pg   CBC Auto Differential   Result Value Ref Range    WBC 7.40 3.40 - 10.80 10*3/mm3    RBC 5.00 4.14 - 5.80 10*6/mm3    Hemoglobin 15.5 13.0 - 17.7 g/dL    Hematocrit 45.7 37.5 - 51.0 %    MCV 91.4 79.0 - 97.0 fL    MCH 31.0 26.6 - 33.0 pg    MCHC 33.9 31.5 - 35.7 g/dL    RDW 13.3 12.3 - 15.4 %    RDW-SD 44.3 37.0 - 54.0 fl    MPV 9.1 6.0 - 12.0 fL    Platelets 265 140 - 450 10*3/mm3    Neutrophil % 56.1 42.7 - 76.0 %    Lymphocyte % 31.2 19.6 - 45.3 %    Monocyte % 9.2 5.0 - 12.0 %    Eosinophil % 2.6 0.3 - 6.2 %    Basophil % 0.5 0.0 - 1.5 %    Immature Grans % 0.4 0.0 - 0.5 %    Neutrophils, Absolute 4.15 1.40 - 7.00 10*3/mm3    Lymphocytes, Absolute 2.31 0.70 - 3.10 10*3/mm3    Monocytes, Absolute 0.68 0.10 - 0.90 10*3/mm3    Eosinophils, Absolute 0.19 0.00 - 0.40 10*3/mm3    Basophils, Absolute 0.04 0.00 - 0.20 10*3/mm3    Immature Grans, Absolute 0.03 0.00 - 0.05 10*3/mm3    nRBC 0.0 0.0 - 0.0 /100 WBC       The following portions of the patient's history were reviewed and updated as appropriate: allergies, current medications, past family history, past medical history, past social history, past surgical history and problem list.      he has a history of   Patient Active Problem List   Diagnosis   • Hyperlipidemia   • Essential hypertension   • Gout   • Type 2 diabetes mellitus without complication (CMS/HCC)   • Gastroesophageal reflux disease without esophagitis   • History of adenomatous polyp of colon   • History of iron  deficiency anemia   • Bence Burger proteinuria   • Chronic kidney disease, stage 2 (mild)   • Hypercalcemia   • Hyperkalemia   • Essential (primary) hypertension   • Hypertensive chronic kidney disease with stage 1 through stage 4 chronic kidney disease, or unspecified chronic kidney disease   • Gout       Review of Systems   Constitutional: Negative for activity change, appetite change, chills, fatigue, fever and unexpected weight change.   HENT: Negative for congestion.    Eyes: Negative for photophobia, redness and visual disturbance.   Respiratory: Negative for chest tightness and shortness of breath.    Cardiovascular: Negative for chest pain and palpitations.   Gastrointestinal: Negative for abdominal pain.   Endocrine: Negative for polyuria.   Genitourinary: Negative for dysuria.   Musculoskeletal: Negative for back pain.   Skin: Negative for color change, rash and wound.   Neurological: Negative for dizziness, tremors, syncope, speech difficulty and numbness.   Psychiatric/Behavioral: Negative for agitation, confusion and decreased concentration.   All other systems reviewed and are negative.      Objective   Physical Exam   Constitutional: He is oriented to person, place, and time. He appears well-developed and well-nourished. No distress.   HENT:   Head: Atraumatic.   Mouth/Throat: Oropharynx is clear and moist.   Eyes: Pupils are equal, round, and reactive to light. EOM are normal.   Neck: Normal range of motion. Neck supple. No thyromegaly present.   Cardiovascular: Normal rate and regular rhythm.   Pulmonary/Chest: Effort normal and breath sounds normal.   Abdominal: Soft.   Musculoskeletal: Normal range of motion.    Eric had a diabetic foot exam performed today.   During the foot exam he had a monofilament test performed.  Neurological: He is alert and oriented to person, place, and time.   Skin: Skin is warm and dry.   Psychiatric: He has a normal mood and affect. His behavior is normal.   Nursing  note and vitals reviewed.      Assessment/Plan   Eric was seen today for hypertension, diabetes and heartburn.    Diagnoses and all orders for this visit:    Mixed hyperlipidemia  -     Comprehensive metabolic panel  -     Lipid panel  -     CBC and Differential  -     TSH  -     Hemoglobin A1c    Gastroesophageal reflux disease without esophagitis  -     omeprazole (priLOSEC) 20 MG capsule; Take 1 capsule by mouth Daily.  -     Comprehensive metabolic panel  -     Lipid panel  -     CBC and Differential  -     TSH  -     Hemoglobin A1c    Essential hypertension  -     hydroCHLOROthiazide (HYDRODIURIL) 12.5 MG tablet; Take 1 tablet by mouth Daily.  -     Comprehensive metabolic panel  -     Lipid panel  -     CBC and Differential  -     TSH  -     Hemoglobin A1c    Type 2 diabetes mellitus without complication, without long-term current use of insulin (CMS/[facility])  -     metFORMIN (GLUCOPHAGE) 500 MG tablet; Take 2 tablets by mouth 2 (Two) Times a Day With Meals.  -     Comprehensive metabolic panel  -     Lipid panel  -     CBC and Differential  -     TSH  -     Hemoglobin A1c  -     Urinalysis With Microscopic - Urine, Clean Catch

## 2020-08-27 ENCOUNTER — TELEPHONE (OUTPATIENT)
Dept: FAMILY MEDICINE CLINIC | Facility: CLINIC | Age: 80
End: 2020-08-27

## 2020-08-27 NOTE — TELEPHONE ENCOUNTER
Spoke with patient. He got labs including [facility] and will bring a copy into the office.

## 2021-01-01 DIAGNOSIS — K21.9 GASTROESOPHAGEAL REFLUX DISEASE WITHOUT ESOPHAGITIS: ICD-10-CM

## 2021-01-01 DIAGNOSIS — E11.9 TYPE 2 DIABETES MELLITUS WITHOUT COMPLICATION, WITHOUT LONG-TERM CURRENT USE OF INSULIN (HCC): ICD-10-CM

## 2021-01-01 DIAGNOSIS — I10 ESSENTIAL HYPERTENSION: ICD-10-CM

## 2021-01-04 RX ORDER — OMEPRAZOLE 20 MG/1
CAPSULE, DELAYED RELEASE ORAL
Qty: 90 CAPSULE | Refills: 1 | OUTPATIENT
Start: 2021-01-04

## 2021-01-04 RX ORDER — HYDROCHLOROTHIAZIDE 12.5 MG/1
TABLET ORAL
Qty: 90 TABLET | Refills: 1 | OUTPATIENT
Start: 2021-01-04

## 2021-01-13 DIAGNOSIS — K21.9 GASTROESOPHAGEAL REFLUX DISEASE WITHOUT ESOPHAGITIS: ICD-10-CM

## 2021-01-13 DIAGNOSIS — I10 ESSENTIAL HYPERTENSION: ICD-10-CM

## 2021-01-13 DIAGNOSIS — E11.9 TYPE 2 DIABETES MELLITUS WITHOUT COMPLICATION, WITHOUT LONG-TERM CURRENT USE OF INSULIN (HCC): ICD-10-CM

## 2021-01-13 NOTE — TELEPHONE ENCOUNTER
Caller: Eric Dukes    Relationship: Self    Best call back number: [phone]    Medication needed:   metFORMIN (GLUCOPHAGE) 500 MG tablet    omeprazole (priLOSEC) 20 MG capsule    hydroCHLOROthiazide (HYDRODIURIL) 12.5 MG tablet  When do you need the refill by: ASAP    What details did the patient provide when requesting the medication: [facility] CALLED TH PE PATIENT AND TOLD HIM THESE MEDICATION HAS BEEN DENIED FOR REFILL  Does the patient have less than a 3 day supply:  [] Yes  [x] No    What is the patient's preferred pharmacy:    [facility] - [facility] [address] - [phone]  - [phone]

## 2021-01-14 DIAGNOSIS — E78.2 MIXED HYPERLIPIDEMIA: ICD-10-CM

## 2021-01-14 DIAGNOSIS — E11.9 TYPE 2 DIABETES MELLITUS WITHOUT COMPLICATION, WITHOUT LONG-TERM CURRENT USE OF INSULIN (HCC): Primary | ICD-10-CM

## 2021-01-14 DIAGNOSIS — I10 ESSENTIAL HYPERTENSION: ICD-10-CM

## 2021-01-14 DIAGNOSIS — K21.9 GASTROESOPHAGEAL REFLUX DISEASE WITHOUT ESOPHAGITIS: ICD-10-CM

## 2021-01-14 RX ORDER — OMEPRAZOLE 20 MG/1
20 CAPSULE, DELAYED RELEASE ORAL DAILY
Qty: 90 CAPSULE | Refills: 1 | Status: SHIPPED | OUTPATIENT
Start: 2021-01-14 | End: 2021-07-26 | Stop reason: SDUPTHER

## 2021-01-14 RX ORDER — HYDROCHLOROTHIAZIDE 12.5 MG/1
12.5 TABLET ORAL DAILY
Qty: 90 TABLET | Refills: 1 | Status: SHIPPED | OUTPATIENT
Start: 2021-01-14 | End: 2021-05-20 | Stop reason: SDUPTHER

## 2021-02-26 ENCOUNTER — OFFICE VISIT (OUTPATIENT)
Dept: FAMILY MEDICINE CLINIC | Facility: CLINIC | Age: 81
End: 2021-02-26

## 2021-02-26 VITALS
WEIGHT: 175 LBS | DIASTOLIC BLOOD PRESSURE: 64 MMHG | OXYGEN SATURATION: 96 % | SYSTOLIC BLOOD PRESSURE: 141 MMHG | HEART RATE: 51 BPM | BODY MASS INDEX: 26.52 KG/M2 | HEIGHT: 68 IN | TEMPERATURE: 97.3 F | RESPIRATION RATE: 16 BRPM

## 2021-02-26 DIAGNOSIS — E11.65 TYPE 2 DIABETES MELLITUS WITH HYPERGLYCEMIA, WITHOUT LONG-TERM CURRENT USE OF INSULIN (HCC): Primary | ICD-10-CM

## 2021-02-26 DIAGNOSIS — I10 ESSENTIAL HYPERTENSION: ICD-10-CM

## 2021-02-26 DIAGNOSIS — E78.2 MIXED HYPERLIPIDEMIA: ICD-10-CM

## 2021-02-26 PROCEDURE — 99214 OFFICE O/P EST MOD 30 MIN: CPT | Performed by: FAMILY MEDICINE

## 2021-02-26 RX ORDER — SIMVASTATIN 40 MG
40 TABLET ORAL NIGHTLY
Qty: 90 TABLET | Refills: 1 | Status: CANCELLED | OUTPATIENT
Start: 2021-02-26

## 2021-02-26 RX ORDER — LOSARTAN POTASSIUM 50 MG/1
50 TABLET ORAL DAILY
Qty: 180 TABLET | Refills: 1 | Status: CANCELLED | OUTPATIENT
Start: 2021-02-26

## 2021-02-26 RX ORDER — AMLODIPINE BESYLATE 10 MG/1
10 TABLET ORAL DAILY
Qty: 90 TABLET | Refills: 1 | Status: CANCELLED | OUTPATIENT
Start: 2021-02-26

## 2021-02-26 RX ORDER — METOPROLOL TARTRATE 100 MG/1
100 TABLET ORAL 2 TIMES DAILY
Qty: 180 TABLET | Refills: 1 | Status: CANCELLED | OUTPATIENT
Start: 2021-02-26

## 2021-02-26 NOTE — PROGRESS NOTES
Subjective   Chief Complaint:   Chief Complaint   Patient presents with   • Hypertension   • Hyperlipidemia   • Diabetes         History of Present Illness This patient comes in to discuss and refill medications. He is going to get Metformin 500 mg 2 twice daily with 1 refill at 360 with a refill.  And then is going get omeprazole or Prilosec 20 mg 1 p.o. daily #90 with a refill these are going to [facility] pharmacy.  He is doing good.  Said his average blood sugar in the morning is about 130.  Organ to continue the same meds hydrochlorothiazide 12.5 mg 1 a day and Metformin 500 mg 2 twice daily and omeprazole Prilosec 20 mg 1 p.o. daily #90 with a refill and I reviewed his blood sugars average blood sugar in the morning is 130.  We did not do lab work this time but he said he is due lab work next time. He did the diabetic foot exam and both feet did great and he has no tingling or numbness are no ulcers on his feet.      Past Medical History:   Diagnosis Date   • Diabetes mellitus (CMS/[facility])     Type 2   • GERD (gastroesophageal reflux disease)    • Gout    • H/O complete eye exam 09/2015   • History of anemia    • Hx of colonic polyp    • Hyperlipidemia    • Hypertension    • Skin cancer         Eric Dukes 80 y.o. male who presents today for Medical Management of the below listed issues and medication refills.    ICD-10-CM ICD-9-CM   1. Type 2 diabetes mellitus with hyperglycemia, without long-term current use of insulin (CMS/[facility])  E11.65 250.00     790.29   2. Essential hypertension  I10 401.9   3. Mixed hyperlipidemia  E78.2 272.2        he has a problem list of   Patient Active Problem List   Diagnosis   • Hyperlipidemia   • Essential hypertension   • Gout   • Type 2 diabetes mellitus without complication (CMS/[facility])   • Gastroesophageal reflux disease without esophagitis   • History of adenomatous polyp of colon   • History of iron deficiency anemia   • Bence Burger proteinuria   • Chronic kidney disease,  "stage 2 (mild)   • Hypercalcemia   • Hyperkalemia   • Essential (primary) hypertension   • Hypertensive chronic kidney disease with stage 1 through stage 4 chronic kidney disease, or unspecified chronic kidney disease   • Gout   .    he has been compliant with   Current Outpatient Medications:   •  allopurinol (ZYLOPRIM) 300 MG tablet, Take 300 mg by mouth daily., Disp: , Rfl:   •  amLODIPine (NORVASC) 10 MG tablet, Take 1 tablet by mouth Daily., Disp: 90 tablet, Rfl: 1  •  aspirin 81 MG tablet, Take 81 mg by mouth daily., Disp: , Rfl:   •  Blood Glucose Monitoring Suppl (FREESTYLE LITE) device, Check blood sugar once per day, Disp: 100 each, Rfl: 3  •  glucose blood (FREESTYLE LITE) test strip, Test blood sugar once daily, Disp: 100 each, Rfl: 3  •  hydroCHLOROthiazide (HYDRODIURIL) 12.5 MG tablet, Take 1 tablet by mouth Daily., Disp: 90 tablet, Rfl: 1  •  losartan (COZAAR) 50 MG tablet, Take 1 tablet by mouth Daily., Disp: 180 tablet, Rfl: 1  •  metFORMIN (GLUCOPHAGE) 500 MG tablet, Take 2 tablets by mouth 2 (Two) Times a Day With Meals., Disp: 360 tablet, Rfl: 1  •  metoprolol tartrate (LOPRESSOR) 100 MG tablet, Take 1 tablet by mouth 2 (Two) Times a Day., Disp: 180 tablet, Rfl: 1  •  Multiple Vitamin (MULTIVITAMIN) tablet, Take 1 tablet by mouth daily., Disp: , Rfl:   •  Omega-3 Fatty Acids (FISH OIL) 1000 MG capsule capsule, Take 1,000 mg by mouth 3 (three) times a day with meals., Disp: , Rfl:   •  omeprazole (priLOSEC) 20 MG capsule, Take 1 capsule by mouth Daily., Disp: 90 capsule, Rfl: 1  •  simvastatin (ZOCOR) 40 MG tablet, Take 1 tablet by mouth Every Night., Disp: 90 tablet, Rfl: 1.  he denies medication side effects.        /64   Pulse 51   Temp 97.3 °F (36.3 °C)   Resp 16   Ht 172 cm (67.72\")   Wt 79.4 kg (175 lb)   SpO2 96%   BMI 26.83 kg/m²     Results for orders placed or performed in visit on 02/25/19   Ferritin    Specimen: Blood   Result Value Ref Range    Ferritin 75.20 30.00 - " 400.00 ng/mL   Iron Profile    Specimen: Blood   Result Value Ref Range    Iron 107 59 - 158 mcg/dL    Iron Saturation 26 14 - 48 %    Transferrin 295 200 - 360 mg/dL    TIBC 413 249 - 505 mcg/dL   Retic With IRF & RET-He    Specimen: Blood   Result Value Ref Range    Immature Reticulocyte Fraction 21.3 (H) 3.0 - 15.8 %    Reticulocyte % 2.38 (H) 0.50 - 1.50 %    Reticulocyte Hgb 35.3 29.8 - 36.1 pg   CBC Auto Differential    Specimen: Blood   Result Value Ref Range    WBC 7.40 3.40 - 10.80 10*3/mm3    RBC 5.00 4.14 - 5.80 10*6/mm3    Hemoglobin 15.5 13.0 - 17.7 g/dL    Hematocrit 45.7 37.5 - 51.0 %    MCV 91.4 79.0 - 97.0 fL    MCH 31.0 26.6 - 33.0 pg    MCHC 33.9 31.5 - 35.7 g/dL    RDW 13.3 12.3 - 15.4 %    RDW-SD 44.3 37.0 - 54.0 fl    MPV 9.1 6.0 - 12.0 fL    Platelets 265 140 - 450 10*3/mm3    Neutrophil % 56.1 42.7 - 76.0 %    Lymphocyte % 31.2 19.6 - 45.3 %    Monocyte % 9.2 5.0 - 12.0 %    Eosinophil % 2.6 0.3 - 6.2 %    Basophil % 0.5 0.0 - 1.5 %    Immature Grans % 0.4 0.0 - 0.5 %    Neutrophils, Absolute 4.15 1.40 - 7.00 10*3/mm3    Lymphocytes, Absolute 2.31 0.70 - 3.10 10*3/mm3    Monocytes, Absolute 0.68 0.10 - 0.90 10*3/mm3    Eosinophils, Absolute 0.19 0.00 - 0.40 10*3/mm3    Basophils, Absolute 0.04 0.00 - 0.20 10*3/mm3    Immature Grans, Absolute 0.03 0.00 - 0.05 10*3/mm3    nRBC 0.0 0.0 - 0.0 /100 WBC       The following portions of the patient's history were reviewed and updated as appropriate: allergies, current medications, past family history, past medical history, past social history, past surgical history and problem list.      he has a history of   Patient Active Problem List   Diagnosis   • Hyperlipidemia   • Essential hypertension   • Gout   • Type 2 diabetes mellitus without complication (CMS/HCC)   • Gastroesophageal reflux disease without esophagitis   • History of adenomatous polyp of colon   • History of iron deficiency anemia   • Bence Burger proteinuria   • Chronic kidney disease,  stage 2 (mild)   • Hypercalcemia   • Hyperkalemia   • Essential (primary) hypertension   • Hypertensive chronic kidney disease with stage 1 through stage 4 chronic kidney disease, or unspecified chronic kidney disease   • Gout       Review of Systems   Constitutional: Negative for activity change, appetite change, chills, diaphoresis, fatigue, fever and unexpected weight change.   HENT: Negative for ear pain, nosebleeds and sinus pressure.    Eyes: Negative for photophobia and visual disturbance.   Respiratory: Negative for chest tightness and shortness of breath.    Cardiovascular: Negative for chest pain and palpitations.   Gastrointestinal: Negative for abdominal distention and abdominal pain.   Endocrine: Negative for polyuria.   Genitourinary: Negative for frequency.   Musculoskeletal: Negative for arthralgias.   Skin: Negative for color change.   Neurological: Negative for dizziness, syncope, speech difficulty and light-headedness.   Psychiatric/Behavioral: Negative for confusion and decreased concentration. The patient is not nervous/anxious.    All other systems reviewed and are negative.      Objective   Physical Exam  Vitals signs and nursing note reviewed.   Constitutional:       General: He is not in acute distress.     Appearance: He is well-developed. He is not ill-appearing or diaphoretic.   HENT:      Head: Normocephalic and atraumatic.      Right Ear: External ear normal.      Left Ear: External ear normal.      Nose: Nose normal.      Mouth/Throat:      Mouth: Mucous membranes are dry.   Eyes:      Extraocular Movements: Extraocular movements intact.      Pupils: Pupils are equal, round, and reactive to light.   Neck:      Musculoskeletal: Normal range of motion and neck supple.      Thyroid: No thyromegaly.   Cardiovascular:      Rate and Rhythm: Normal rate and regular rhythm.   Pulmonary:      Effort: Pulmonary effort is normal. No respiratory distress.      Breath sounds: Normal breath sounds.    Abdominal:      General: Abdomen is flat.      Palpations: Abdomen is soft.      Tenderness: There is no abdominal tenderness.   Musculoskeletal: Normal range of motion.         General: No signs of injury.   Feet:      Comments: DIABETIC FOOT EXAM NEGATIVE  Skin:     General: Skin is warm and dry.   Neurological:      Mental Status: He is alert and oriented to person, place, and time.   Psychiatric:         Mood and Affect: Mood normal.         Behavior: Behavior normal.         Thought Content: Thought content normal.         Judgment: Judgment normal.         Assessment/Plan   Diagnoses and all orders for this visit:    1. Type 2 diabetes mellitus with hyperglycemia, without long-term current use of insulin (CMS/[facility]) (Primary)    2. Essential hypertension    3. Mixed hyperlipidemia    Other orders  -     Cancel: amLODIPine (NORVASC) 10 MG tablet; Take 1 tablet by mouth Daily.  Dispense: 90 tablet; Refill: 1  -     Cancel: losartan (COZAAR) 50 MG tablet; Take 1 tablet by mouth Daily.  Dispense: 180 tablet; Refill: 1  -     Cancel: metoprolol tartrate (LOPRESSOR) 100 MG tablet; Take 1 tablet by mouth 2 (Two) Times a Day.  Dispense: 180 tablet; Refill: 1  -     Cancel: simvastatin (ZOCOR) 40 MG tablet; Take 1 tablet by mouth Every Night.  Dispense: 90 tablet; Refill: 1      Labs results discussed in detail with the patient, if no recent labs were done, order placed today.  Plan to update vaccines if needed today.  I  reviewed health maintenance with the patient as part of my preventative care plan. I discussed preventative counseling with the patient in detail.

## 2021-05-20 DIAGNOSIS — I10 ESSENTIAL HYPERTENSION: ICD-10-CM

## 2021-05-20 RX ORDER — HYDROCHLOROTHIAZIDE 12.5 MG/1
12.5 TABLET ORAL DAILY
Qty: 30 TABLET | Refills: 0 | Status: SHIPPED | OUTPATIENT
Start: 2021-05-20 | End: 2021-06-24 | Stop reason: SDUPTHER

## 2021-06-09 DIAGNOSIS — I10 ESSENTIAL HYPERTENSION: ICD-10-CM

## 2021-06-09 RX ORDER — HYDROCHLOROTHIAZIDE 12.5 MG/1
TABLET ORAL
Qty: 30 TABLET | Refills: 0 | OUTPATIENT
Start: 2021-06-09

## 2021-06-24 ENCOUNTER — TELEPHONE (OUTPATIENT)
Dept: FAMILY MEDICINE CLINIC | Facility: CLINIC | Age: 81
End: 2021-06-24

## 2021-06-24 DIAGNOSIS — I10 ESSENTIAL HYPERTENSION: ICD-10-CM

## 2021-06-24 RX ORDER — HYDROCHLOROTHIAZIDE 12.5 MG/1
12.5 TABLET ORAL DAILY
Qty: 90 TABLET | Refills: 0 | Status: SHIPPED | OUTPATIENT
Start: 2021-06-24 | End: 2021-08-22

## 2021-07-23 ENCOUNTER — TELEPHONE (OUTPATIENT)
Dept: FAMILY MEDICINE CLINIC | Facility: CLINIC | Age: 81
End: 2021-07-23

## 2021-07-23 DIAGNOSIS — K21.9 GASTROESOPHAGEAL REFLUX DISEASE WITHOUT ESOPHAGITIS: ICD-10-CM

## 2021-07-26 RX ORDER — OMEPRAZOLE 20 MG/1
20 CAPSULE, DELAYED RELEASE ORAL DAILY
Qty: 90 CAPSULE | Refills: 0 | Status: SHIPPED | OUTPATIENT
Start: 2021-07-26 | End: 2021-08-27 | Stop reason: SDUPTHER

## 2021-08-21 DIAGNOSIS — I10 ESSENTIAL HYPERTENSION: ICD-10-CM

## 2021-08-22 RX ORDER — HYDROCHLOROTHIAZIDE 12.5 MG/1
TABLET ORAL
Qty: 90 TABLET | Refills: 0 | Status: SHIPPED | OUTPATIENT
Start: 2021-08-22 | End: 2021-08-27 | Stop reason: SDUPTHER

## 2021-08-27 ENCOUNTER — OFFICE VISIT (OUTPATIENT)
Dept: FAMILY MEDICINE CLINIC | Facility: CLINIC | Age: 81
End: 2021-08-27

## 2021-08-27 VITALS
BODY MASS INDEX: 25.61 KG/M2 | DIASTOLIC BLOOD PRESSURE: 63 MMHG | SYSTOLIC BLOOD PRESSURE: 158 MMHG | HEIGHT: 68 IN | RESPIRATION RATE: 16 BRPM | TEMPERATURE: 96.8 F | WEIGHT: 169 LBS | HEART RATE: 52 BPM | OXYGEN SATURATION: 98 %

## 2021-08-27 DIAGNOSIS — Z87.39 HISTORY OF GOUT: ICD-10-CM

## 2021-08-27 DIAGNOSIS — E11.9 TYPE 2 DIABETES MELLITUS WITHOUT COMPLICATION, WITHOUT LONG-TERM CURRENT USE OF INSULIN (HCC): ICD-10-CM

## 2021-08-27 DIAGNOSIS — E78.2 MIXED HYPERLIPIDEMIA: Primary | ICD-10-CM

## 2021-08-27 DIAGNOSIS — K21.9 GASTROESOPHAGEAL REFLUX DISEASE WITHOUT ESOPHAGITIS: ICD-10-CM

## 2021-08-27 DIAGNOSIS — I10 ESSENTIAL HYPERTENSION: ICD-10-CM

## 2021-08-27 PROCEDURE — 99214 OFFICE O/P EST MOD 30 MIN: CPT | Performed by: NURSE PRACTITIONER

## 2021-08-27 RX ORDER — HYDROCHLOROTHIAZIDE 12.5 MG/1
12.5 TABLET ORAL DAILY
Qty: 90 TABLET | Refills: 1 | Status: SHIPPED | OUTPATIENT
Start: 2021-08-27 | End: 2022-01-12

## 2021-08-27 RX ORDER — OMEPRAZOLE 20 MG/1
20 CAPSULE, DELAYED RELEASE ORAL DAILY
Qty: 90 CAPSULE | Refills: 1 | Status: SHIPPED | OUTPATIENT
Start: 2021-08-27 | End: 2022-02-11

## 2021-08-27 NOTE — PROGRESS NOTES
"Subjective   Eric Dukes is a 81 y.o. male.     History of Present Illness    Since the last visit, he has overall felt well.  He has Essential Hypertension and well controlled on current medication, DMII well controlled on medication and will continue regimen, GERD controlled on PPI Rx, Hyperlipidemia with goals met with current Rx and gout that is well controlled on allopurinol which he gets from Dr. Cooper.  He is UTD with nephrologist Dr. Brown.  [facility]. He is due to have labs next week and will bring results.  Last A1c was 6.3.  he has been compliant with current medications have reviewed them.  The patient denies medication side effects.  Will refill medications. /63   Pulse 52   Temp 96.8 °F (36 °C) (Skin)   Resp 16   Ht 172 cm (67.72\")   Wt 76.7 kg (169 lb)   SpO2 98%   BMI 25.91 kg/m²     Results for orders placed or performed in visit on 02/25/19   Ferritin    Specimen: Blood   Result Value Ref Range    Ferritin 75.20 30.00 - 400.00 ng/mL   Iron Profile    Specimen: Blood   Result Value Ref Range    Iron 107 59 - 158 mcg/dL    Iron Saturation 26 14 - 48 %    Transferrin 295 200 - 360 mg/dL    TIBC 413 249 - 505 mcg/dL   Retic With IRF & RET-He    Specimen: Blood   Result Value Ref Range    Immature Reticulocyte Fraction 21.3 (H) 3.0 - 15.8 %    Reticulocyte % 2.38 (H) 0.50 - 1.50 %    Reticulocyte Hgb 35.3 29.8 - 36.1 pg   CBC Auto Differential    Specimen: Blood   Result Value Ref Range    WBC 7.40 3.40 - 10.80 10*3/mm3    RBC 5.00 4.14 - 5.80 10*6/mm3    Hemoglobin 15.5 13.0 - 17.7 g/dL    Hematocrit 45.7 37.5 - 51.0 %    MCV 91.4 79.0 - 97.0 fL    MCH 31.0 26.6 - 33.0 pg    MCHC 33.9 31.5 - 35.7 g/dL    RDW 13.3 12.3 - 15.4 %    RDW-SD 44.3 37.0 - 54.0 fl    MPV 9.1 6.0 - 12.0 fL    Platelets 265 140 - 450 10*3/mm3    Neutrophil % 56.1 42.7 - 76.0 %    Lymphocyte % 31.2 19.6 - 45.3 %    Monocyte % 9.2 5.0 - 12.0 %    Eosinophil % 2.6 0.3 " - 6.2 %    Basophil % 0.5 0.0 - 1.5 %    Immature Grans % 0.4 0.0 - 0.5 %    Neutrophils, Absolute 4.15 1.40 - 7.00 10*3/mm3    Lymphocytes, Absolute 2.31 0.70 - 3.10 10*3/mm3    Monocytes, Absolute 0.68 0.10 - 0.90 10*3/mm3    Eosinophils, Absolute 0.19 0.00 - 0.40 10*3/mm3    Basophils, Absolute 0.04 0.00 - 0.20 10*3/mm3    Immature Grans, Absolute 0.03 0.00 - 0.05 10*3/mm3    nRBC 0.0 0.0 - 0.0 /100 WBC     Glucose at home runs 120-140.   [facility]  He is unsure when he had pneumonia vaccine last but will check.   The following portions of the patient's history were reviewed and updated as appropriate: allergies, current medications, past family history, past medical history, past social history, past surgical history and problem list.    Review of Systems   Constitutional: Negative for fatigue.   Eyes: Negative for visual disturbance.   Respiratory: Negative for cough and shortness of breath.    Cardiovascular: Negative for chest pain and palpitations.   Endocrine: Negative for cold intolerance, heat intolerance, polydipsia, polyphagia and polyuria.   Skin: Negative for rash.   Psychiatric/Behavioral: Negative for dysphoric mood and sleep disturbance. The patient is not nervous/anxious.        Objective   Physical Exam  Vitals and nursing note reviewed.   Constitutional:       Appearance: Normal appearance. He is well-developed.   Neck:      Vascular: No carotid bruit.   Cardiovascular:      Rate and Rhythm: Normal rate and regular rhythm.   Pulmonary:      Effort: Pulmonary effort is normal.      Breath sounds: Normal breath sounds.   Skin:     General: Skin is warm and dry.   Neurological:      Mental Status: He is alert and oriented to person, place, and time.   Psychiatric:         Mood and Affect: Mood normal.         Behavior: Behavior normal.         Thought Content: Thought content normal.         Judgment: Judgment normal.         Assessment/Plan   Diagnoses and all  orders for this visit:    1. Mixed hyperlipidemia (Primary)    2. Essential hypertension  -     hydroCHLOROthiazide (HYDRODIURIL) 12.5 MG tablet; Take 1 tablet by mouth Daily.  Dispense: 90 tablet; Refill: 1    3. Gastroesophageal reflux disease without esophagitis  -     omeprazole (priLOSEC) 20 MG capsule; Take 1 capsule by mouth Daily.  Dispense: 90 capsule; Refill: 1    4. Type 2 diabetes mellitus without complication, without long-term current use of insulin (CMS/[facility])  -     metFORMIN (GLUCOPHAGE) 500 MG tablet; Take 1 tablet by mouth 3 (Three) Times a Day.  Dispense: 270 tablet; Refill: 1    5. History of gout

## 2021-11-30 ENCOUNTER — TRANSCRIBE ORDERS (OUTPATIENT)
Dept: ADMINISTRATIVE | Facility: HOSPITAL | Age: 81
End: 2021-11-30

## 2021-11-30 DIAGNOSIS — R60.9 EDEMA, UNSPECIFIED TYPE: Primary | ICD-10-CM

## 2021-12-02 ENCOUNTER — HOSPITAL ENCOUNTER (OUTPATIENT)
Dept: CARDIOLOGY | Facility: HOSPITAL | Age: 81
Discharge: HOME | End: 2021-12-02
Admitting: INTERNAL MEDICINE

## 2021-12-02 DIAGNOSIS — R60.9 EDEMA, UNSPECIFIED TYPE: ICD-10-CM

## 2021-12-02 LAB
BH CV LOWER VASCULAR LEFT COMMON FEMORAL AUGMENT: NORMAL
BH CV LOWER VASCULAR LEFT COMMON FEMORAL COMPETENT: NORMAL
BH CV LOWER VASCULAR LEFT COMMON FEMORAL COMPRESS: NORMAL
BH CV LOWER VASCULAR LEFT COMMON FEMORAL PHASIC: NORMAL
BH CV LOWER VASCULAR LEFT COMMON FEMORAL SPONT: NORMAL
BH CV LOWER VASCULAR RIGHT COMMON FEMORAL AUGMENT: NORMAL
BH CV LOWER VASCULAR RIGHT COMMON FEMORAL COMPETENT: NORMAL
BH CV LOWER VASCULAR RIGHT COMMON FEMORAL COMPRESS: NORMAL
BH CV LOWER VASCULAR RIGHT COMMON FEMORAL PHASIC: NORMAL
BH CV LOWER VASCULAR RIGHT COMMON FEMORAL SPONT: NORMAL
BH CV LOWER VASCULAR RIGHT DISTAL FEMORAL COMPRESS: NORMAL
BH CV LOWER VASCULAR RIGHT GASTRONEMIUS COMPRESS: NORMAL
BH CV LOWER VASCULAR RIGHT GREATER SAPH AK COMPRESS: NORMAL
BH CV LOWER VASCULAR RIGHT GREATER SAPH BK COMPRESS: NORMAL
BH CV LOWER VASCULAR RIGHT LESSER SAPH COMPRESS: NORMAL
BH CV LOWER VASCULAR RIGHT MID FEMORAL AUGMENT: NORMAL
BH CV LOWER VASCULAR RIGHT MID FEMORAL COMPETENT: NORMAL
BH CV LOWER VASCULAR RIGHT MID FEMORAL COMPRESS: NORMAL
BH CV LOWER VASCULAR RIGHT MID FEMORAL PHASIC: NORMAL
BH CV LOWER VASCULAR RIGHT MID FEMORAL SPONT: NORMAL
BH CV LOWER VASCULAR RIGHT PERONEAL COMPRESS: NORMAL
BH CV LOWER VASCULAR RIGHT POPLITEAL AUGMENT: NORMAL
BH CV LOWER VASCULAR RIGHT POPLITEAL COMPETENT: NORMAL
BH CV LOWER VASCULAR RIGHT POPLITEAL COMPRESS: NORMAL
BH CV LOWER VASCULAR RIGHT POPLITEAL PHASIC: NORMAL
BH CV LOWER VASCULAR RIGHT POPLITEAL SPONT: NORMAL
BH CV LOWER VASCULAR RIGHT POSTERIOR TIBIAL COMPRESS: NORMAL
BH CV LOWER VASCULAR RIGHT PROFUNDA FEMORAL COMPRESS: NORMAL
BH CV LOWER VASCULAR RIGHT PROXIMAL FEMORAL COMPRESS: NORMAL
BH CV LOWER VASCULAR RIGHT SAPHENOFEMORAL JUNCTION COMPRESS: NORMAL
MAXIMAL PREDICTED HEART RATE: 141 BPM
STRESS TARGET HR: 120 BPM

## 2021-12-02 PROCEDURE — 93971 EXTREMITY STUDY: CPT

## 2021-12-31 DIAGNOSIS — E11.9 TYPE 2 DIABETES MELLITUS WITHOUT COMPLICATION, WITHOUT LONG-TERM CURRENT USE OF INSULIN: ICD-10-CM

## 2022-01-12 DIAGNOSIS — I10 ESSENTIAL HYPERTENSION: ICD-10-CM

## 2022-01-12 RX ORDER — HYDROCHLOROTHIAZIDE 12.5 MG/1
TABLET ORAL
Qty: 90 TABLET | Refills: 1 | Status: SHIPPED | OUTPATIENT
Start: 2022-01-12 | End: 2022-03-01 | Stop reason: SDUPTHER

## 2022-02-11 DIAGNOSIS — K21.9 GASTROESOPHAGEAL REFLUX DISEASE WITHOUT ESOPHAGITIS: ICD-10-CM

## 2022-02-11 RX ORDER — OMEPRAZOLE 20 MG/1
CAPSULE, DELAYED RELEASE ORAL
Qty: 90 CAPSULE | Refills: 1 | Status: SHIPPED | OUTPATIENT
Start: 2022-02-11 | End: 2022-03-01 | Stop reason: SDUPTHER

## 2022-03-01 ENCOUNTER — OFFICE VISIT (OUTPATIENT)
Dept: FAMILY MEDICINE CLINIC | Facility: CLINIC | Age: 82
End: 2022-03-01

## 2022-03-01 VITALS
HEIGHT: 68 IN | OXYGEN SATURATION: 97 % | BODY MASS INDEX: 25.61 KG/M2 | DIASTOLIC BLOOD PRESSURE: 60 MMHG | HEART RATE: 54 BPM | SYSTOLIC BLOOD PRESSURE: 118 MMHG | WEIGHT: 169 LBS | RESPIRATION RATE: 16 BRPM | TEMPERATURE: 97.3 F

## 2022-03-01 DIAGNOSIS — I10 ESSENTIAL HYPERTENSION: ICD-10-CM

## 2022-03-01 DIAGNOSIS — E11.9 TYPE 2 DIABETES MELLITUS WITHOUT COMPLICATION, WITHOUT LONG-TERM CURRENT USE OF INSULIN: ICD-10-CM

## 2022-03-01 DIAGNOSIS — K21.9 GASTROESOPHAGEAL REFLUX DISEASE WITHOUT ESOPHAGITIS: ICD-10-CM

## 2022-03-01 PROCEDURE — 99214 OFFICE O/P EST MOD 30 MIN: CPT | Performed by: NURSE PRACTITIONER

## 2022-03-01 RX ORDER — HYDROCHLOROTHIAZIDE 12.5 MG/1
12.5 TABLET ORAL DAILY
Qty: 90 TABLET | Refills: 1 | Status: SHIPPED | OUTPATIENT
Start: 2022-03-01 | End: 2022-08-12

## 2022-03-01 RX ORDER — OMEPRAZOLE 20 MG/1
20 CAPSULE, DELAYED RELEASE ORAL DAILY
Qty: 90 CAPSULE | Refills: 1 | Status: SHIPPED | OUTPATIENT
Start: 2022-03-01 | End: 2022-09-07

## 2022-03-01 NOTE — PROGRESS NOTES
"Subjective   Eric Dukes is a 81 y.o. male.     History of Present Illness    Since the last visit, he has overall felt well.  He has Primary Hypertension and well controlled on current medication, DMII well controlled on medication and will continue regimen, GERD controlled on PPI Rx, Hyperlipidemia with goals met with current Rx and gout which is managed by his rheumatologist.  he has been compliant with current medications have reviewed them.  The patient denies medication side effects.  Will refill medications. /60   Pulse 54   Temp 97.3 °F (36.3 °C)   Resp 16   Ht 172 cm (67.72\")   Wt 76.7 kg (169 lb)   SpO2 97%   BMI 25.91 kg/m²     Results for orders placed or performed during the hospital encounter of 12/02/21   Duplex venous lower extremity right CAR   Result Value Ref Range    Target HR (85%) 120 bpm    Max. Pred. HR (100%) 141 bpm    Right Common Femoral Spont Y     Right Common Femoral Phasic Y     Right Common Femoral Augment Y     Right Common Femoral Competent Y     Right Common Femoral Compress C     Right Saphenofemoral Junction Compress C     Right Profunda Femoral Compress C     Right Proximal Femoral Compress C     Right Mid Femoral Spont Y     Right Mid Femoral Phasic Y     Right Mid Femoral Augment Y     Right Mid Femoral Competent Y     Right Mid Femoral Compress C     Right Distal Femoral Compress C     Right Popliteal Spont Y     Right Popliteal Phasic Y     Right Popliteal Augment Y     Right Popliteal Competent Y     Right Popliteal Compress C     Right Posterior Tibial Compress C     Right Peroneal Compress C     Right Gastronemius Compress C     Right Greater Saph AK Compress C     Right Greater Saph BK Compress C     Right Lesser Saph Compress C     Left Common Femoral Spont Y     Left Common Femoral Phasic Y     Left Common Femoral Augment Y     Left Common Femoral Competent Y     Left Common Femoral Compress C      He gets some of his medications and his lab work " through [facility].     The following portions of the patient's history were reviewed and updated as appropriate: allergies, current medications, past family history, past medical history, past social history, past surgical history and problem list.    Review of Systems   Constitutional: Negative for fatigue.   Respiratory: Negative for cough and shortness of breath.    Cardiovascular: Negative for chest pain and palpitations.   Endocrine: Negative for cold intolerance, heat intolerance, polydipsia, polyphagia and polyuria.   Musculoskeletal: Positive for arthralgias and joint swelling.   Skin: Negative for rash.   Psychiatric/Behavioral: Negative for dysphoric mood and sleep disturbance. The patient is not nervous/anxious.        Objective   Physical Exam  Vitals and nursing note reviewed.   Constitutional:       Appearance: Normal appearance. He is well-developed.   Neck:      Vascular: No carotid bruit.   Cardiovascular:      Rate and Rhythm: Normal rate and regular rhythm.   Pulmonary:      Effort: Pulmonary effort is normal.      Breath sounds: Normal breath sounds.   Neurological:      Mental Status: He is alert and oriented to person, place, and time.   Psychiatric:         Mood and Affect: Mood normal.         Behavior: Behavior normal.         Thought Content: Thought content normal.         Judgment: Judgment normal.         Assessment/Plan   Diagnoses and all orders for this visit:    1. Type 2 diabetes mellitus without complication, without long-term current use of insulin (HCC)  -     metFORMIN (GLUCOPHAGE) 500 MG tablet; Take 1 tablet by mouth 3 (Three) Times a Day.  Dispense: 270 tablet; Refill: 1    2. Gastroesophageal reflux disease without esophagitis  -     omeprazole (priLOSEC) 20 MG capsule; Take 1 capsule by mouth Daily.  Dispense: 90 capsule; Refill: 1    3. Essential hypertension  -     hydroCHLOROthiazide (HYDRODIURIL) 12.5 MG tablet; Take 1 tablet by mouth Daily.  Dispense: 90 tablet;  Refill: 1        [facility] and will bring a copy of results to scan in.   He is due for colonoscopy and will schedule with Dr. Hyman.

## 2022-08-12 DIAGNOSIS — E11.9 TYPE 2 DIABETES MELLITUS WITHOUT COMPLICATION, WITHOUT LONG-TERM CURRENT USE OF INSULIN: ICD-10-CM

## 2022-08-12 DIAGNOSIS — I10 ESSENTIAL HYPERTENSION: ICD-10-CM

## 2022-08-12 RX ORDER — HYDROCHLOROTHIAZIDE 12.5 MG/1
TABLET ORAL
Qty: 90 TABLET | Refills: 1 | Status: SHIPPED | OUTPATIENT
Start: 2022-08-12 | End: 2022-10-05 | Stop reason: SDUPTHER

## 2022-09-06 DIAGNOSIS — K21.9 GASTROESOPHAGEAL REFLUX DISEASE WITHOUT ESOPHAGITIS: ICD-10-CM

## 2022-09-07 RX ORDER — OMEPRAZOLE 20 MG/1
CAPSULE, DELAYED RELEASE ORAL
Qty: 14 CAPSULE | Refills: 0 | Status: SHIPPED | OUTPATIENT
Start: 2022-09-07 | End: 2022-10-05 | Stop reason: SDUPTHER

## 2022-10-04 DIAGNOSIS — K21.9 GASTROESOPHAGEAL REFLUX DISEASE WITHOUT ESOPHAGITIS: ICD-10-CM

## 2022-10-05 ENCOUNTER — OFFICE VISIT (OUTPATIENT)
Dept: FAMILY MEDICINE CLINIC | Facility: CLINIC | Age: 82
End: 2022-10-05

## 2022-10-05 VITALS
BODY MASS INDEX: 25.16 KG/M2 | SYSTOLIC BLOOD PRESSURE: 146 MMHG | OXYGEN SATURATION: 96 % | HEART RATE: 49 BPM | TEMPERATURE: 97 F | WEIGHT: 166 LBS | DIASTOLIC BLOOD PRESSURE: 66 MMHG | RESPIRATION RATE: 16 BRPM | HEIGHT: 68 IN

## 2022-10-05 DIAGNOSIS — K21.9 GASTROESOPHAGEAL REFLUX DISEASE WITHOUT ESOPHAGITIS: ICD-10-CM

## 2022-10-05 DIAGNOSIS — Z12.12 SCREENING FOR COLORECTAL CANCER: ICD-10-CM

## 2022-10-05 DIAGNOSIS — I10 ESSENTIAL HYPERTENSION: ICD-10-CM

## 2022-10-05 DIAGNOSIS — E11.9 TYPE 2 DIABETES MELLITUS WITHOUT COMPLICATION, WITHOUT LONG-TERM CURRENT USE OF INSULIN: Primary | ICD-10-CM

## 2022-10-05 DIAGNOSIS — Z12.11 SCREENING FOR COLORECTAL CANCER: ICD-10-CM

## 2022-10-05 PROCEDURE — 99214 OFFICE O/P EST MOD 30 MIN: CPT | Performed by: NURSE PRACTITIONER

## 2022-10-05 RX ORDER — OMEPRAZOLE 20 MG/1
20 CAPSULE, DELAYED RELEASE ORAL DAILY
Qty: 90 CAPSULE | Refills: 1 | Status: SHIPPED | OUTPATIENT
Start: 2022-10-05

## 2022-10-05 RX ORDER — OMEPRAZOLE 20 MG/1
CAPSULE, DELAYED RELEASE ORAL
Qty: 28 CAPSULE | Refills: 0 | Status: SHIPPED | OUTPATIENT
Start: 2022-10-05

## 2022-10-05 RX ORDER — HYDROCHLOROTHIAZIDE 12.5 MG/1
12.5 TABLET ORAL DAILY
Qty: 90 TABLET | Refills: 1 | Status: SHIPPED | OUTPATIENT
Start: 2022-10-05

## 2022-10-05 NOTE — PROGRESS NOTES
"Subjective   Eric Dukes is a 82 y.o. male.     History of Present Illness    Since the last visit, he has overall felt well.  He has Primary Hypertension and well controlled on current medication, DMII well controlled on medication and will continue regimen, GERD controlled on PPI Rx and gout that is well controlled on allopurinol .  he has been compliant with current medications have reviewed them.  The patient denies medication side effects.  Will refill medications. /66   Pulse (!) 49   Temp 97 °F (36.1 °C) (Skin)   Resp 16   Ht 172 cm (67.72\")   Wt 75.3 kg (166 lb)   SpO2 96%   BMI 25.45 kg/m²     Results for orders placed or performed during the hospital encounter of 12/02/21   Duplex venous lower extremity right CAR   Result Value Ref Range    Target HR (85%) 120 bpm    Max. Pred. HR (100%) 141 bpm    Right Common Femoral Spont Y     Right Common Femoral Phasic Y     Right Common Femoral Augment Y     Right Common Femoral Competent Y     Right Common Femoral Compress C     Right Saphenofemoral Junction Compress C     Right Profunda Femoral Compress C     Right Proximal Femoral Compress C     Right Mid Femoral Spont Y     Right Mid Femoral Phasic Y     Right Mid Femoral Augment Y     Right Mid Femoral Competent Y     Right Mid Femoral Compress C     Right Distal Femoral Compress C     Right Popliteal Spont Y     Right Popliteal Phasic Y     Right Popliteal Augment Y     Right Popliteal Competent Y     Right Popliteal Compress C     Right Posterior Tibial Compress C     Right Peroneal Compress C     Right Gastronemius Compress C     Right Greater Saph AK Compress C     Right Greater Saph BK Compress C     Right Lesser Saph Compress C     Left Common Femoral Spont Y     Left Common Femoral Phasic Y     Left Common Femoral Augment Y     Left Common Femoral Competent Y     Left Common Femoral Compress C      [facility] and is due appt and labs.  Last lab set was March.   He gets " allopurinol, losartan, metoprolol, simvastatin, amlodipine [facility].    Last A1c was 6.3.   The following portions of the patient's history were reviewed and updated as appropriate: allergies, current medications, past family history, past medical history, past social history, past surgical history and problem list.    Review of Systems   Constitutional: Negative for fatigue.   Respiratory: Negative for cough and shortness of breath.    Cardiovascular: Negative for chest pain and palpitations.   Endocrine: Negative for cold intolerance, heat intolerance, polydipsia, polyphagia and polyuria.   Skin: Negative for rash.   Psychiatric/Behavioral: Negative for dysphoric mood and sleep disturbance. The patient is not nervous/anxious.        Objective   Physical Exam  Vitals and nursing note reviewed.   Constitutional:       Appearance: Normal appearance. He is well-developed.   Neck:      Vascular: No carotid bruit.   Cardiovascular:      Rate and Rhythm: Normal rate and regular rhythm.   Pulmonary:      Effort: Pulmonary effort is normal.      Breath sounds: Normal breath sounds.   Neurological:      Mental Status: He is alert and oriented to person, place, and time.   Psychiatric:         Mood and Affect: Mood normal.         Behavior: Behavior normal.         Thought Content: Thought content normal.         Judgment: Judgment normal.         Assessment & Plan   Diagnoses and all orders for this visit:    1. Type 2 diabetes mellitus without complication, without long-term current use of insulin ([facility]) (Primary)  -     metFORMIN (GLUCOPHAGE) 500 MG tablet; Take 1 tablet by mouth 3 (Three) Times a Day.  Dispense: 270 tablet; Refill: 1    2. Gastroesophageal reflux disease without esophagitis  -     omeprazole (priLOSEC) 20 MG capsule; Take 1 capsule by mouth Daily.  Dispense: 90 capsule; Refill: 1    3. Essential hypertension  -     hydroCHLOROthiazide (HYDRODIURIL) 12.5 MG tablet; Take 1 tablet by mouth Daily.   Dispense: 90 tablet; Refill: 1    4. Screening for colorectal cancer  -     Ambulatory Referral For Screening Colonoscopy

## 2022-10-06 ENCOUNTER — TELEPHONE (OUTPATIENT)
Dept: GASTROENTEROLOGY | Facility: CLINIC | Age: 82
End: 2022-10-06

## 2022-10-06 NOTE — TELEPHONE ENCOUNTER
----- Message from Xavi Miguel sent at 10/5/2022 10:09 AM EDT -----  Contact: [phone]    Contact patient for oa [email or web address]

## 2022-10-31 ENCOUNTER — TELEPHONE (OUTPATIENT)
Dept: FAMILY MEDICINE CLINIC | Facility: CLINIC | Age: 82
End: 2022-10-31

## 2022-10-31 NOTE — TELEPHONE ENCOUNTER
Caller: Eric Dukes    Relationship: Self    Best call back number: [phone]    What is the best time to reach you: PATIENT     Who are you requesting to speak with (clinical staff, provider,  specific staff member) MA    What was the call regarding: PATIENT IS CALLING IN TO CHECK STATUS OF PAPER HE LEFT LAST WEEK FOR [facility] TO FILL OUT [facility].    Do you require a callback: YES

## 2022-10-31 NOTE — TELEPHONE ENCOUNTER
I am not able to find the paper. Patient is also due for a foot exam. LM for patient to schedule an appointment.     Okay for hub to relay

## 2022-11-01 ENCOUNTER — OFFICE VISIT (OUTPATIENT)
Dept: FAMILY MEDICINE CLINIC | Facility: CLINIC | Age: 82
End: 2022-11-01

## 2022-11-01 VITALS
BODY MASS INDEX: 25.76 KG/M2 | DIASTOLIC BLOOD PRESSURE: 60 MMHG | TEMPERATURE: 97.5 F | SYSTOLIC BLOOD PRESSURE: 110 MMHG | HEIGHT: 68 IN | RESPIRATION RATE: 16 BRPM | HEART RATE: 56 BPM | OXYGEN SATURATION: 97 % | WEIGHT: 170 LBS

## 2022-11-01 DIAGNOSIS — E11.9 TYPE 2 DIABETES MELLITUS WITHOUT COMPLICATION, WITHOUT LONG-TERM CURRENT USE OF INSULIN: Primary | ICD-10-CM

## 2022-11-01 DIAGNOSIS — R60.0 LOWER EXTREMITY EDEMA: ICD-10-CM

## 2022-11-01 PROCEDURE — 99213 OFFICE O/P EST LOW 20 MIN: CPT | Performed by: NURSE PRACTITIONER

## 2022-11-01 NOTE — PROGRESS NOTES
Subjective   Eric Dukes is a 82 y.o. male.     History of Present Illness    Diabetes (Patient in need of diabetic shoes)  Patient has type 2 DM that has been well controlled on metformin.  His [facility] 6.3.   He has seen his podiatrist [facility] last Tuesday and had diabetic foot exam performed but needs visit with his PCP and diabetic footwear forms filled out. He has been using diabetic footwear for 10 years.  He states his foot exam was normal with no decreased sensation though he does have some ankle swelling and balance issues. He would benefit from diabetic footwear due to circulation issues.      [facility].   The following portions of the patient's history were reviewed and updated as appropriate: allergies, current medications, past family history, past medical history, past social history, past surgical history and problem list.    Review of Systems   Constitutional: Negative for fatigue.   Respiratory: Negative for cough and shortness of breath.    Cardiovascular: Negative for chest pain and palpitations.   Endocrine: Negative for cold intolerance, heat intolerance, polydipsia, polyphagia and polyuria.   Skin: Negative for rash.   Psychiatric/Behavioral: Negative for dysphoric mood and sleep disturbance. The patient is not nervous/anxious.        Objective   Physical Exam  Vitals and nursing note reviewed.   Constitutional:       Appearance: Normal appearance. He is well-developed.   Cardiovascular:      Rate and Rhythm: Bradycardia present.   Pulmonary:      Effort: Pulmonary effort is normal.   Neurological:      Mental Status: He is alert and oriented to person, place, and time.   Psychiatric:         Mood and Affect: Mood normal.         Behavior: Behavior normal.         Thought Content: Thought content normal.         Judgment: Judgment normal.         Assessment & Plan   Diagnoses and all orders for this visit:    1. Type 2 diabetes mellitus  without complication, without long-term current use of insulin (HCC) (Primary)    2. Lower extremity edema      Paperwork filled out for footwear.

## 2022-12-02 ENCOUNTER — TELEPHONE (OUTPATIENT)
Dept: GASTROENTEROLOGY | Facility: CLINIC | Age: 82
End: 2022-12-02

## 2022-12-02 NOTE — TELEPHONE ENCOUNTER
Caller: Eric Dukes    Relationship to patient: Self    Best call back number: [phone]    Patient is needing: PT IS CALLING TO SCHEDULE COLONOSCOPY. OA WILL BE SENT IN OVER WEEKEND.    PT PREFERS TO SCHEDULE APPOINTMENT FOR FIRST OF 2023.

## 2022-12-07 ENCOUNTER — TELEPHONE (OUTPATIENT)
Dept: FAMILY MEDICINE CLINIC | Facility: CLINIC | Age: 82
End: 2022-12-07

## 2022-12-07 NOTE — TELEPHONE ENCOUNTER
PATIENT CALLED AND STATES HE HAD A VISIT FOR DIABETIC FOOT EXAM AND THE NOTES WERE SUPPOSE TO BE SENT TO [facility]. THEY HAVE NOT RECEIVED THE NOTES.     PLEASE CALL [phone] WHEN SENT TO [facility]

## 2022-12-12 ENCOUNTER — PRE-PROCEDURE SCREENING (OUTPATIENT)
Dept: GASTROENTEROLOGY | Facility: CLINIC | Age: 82
End: 2022-12-12

## 2022-12-13 ENCOUNTER — TELEPHONE (OUTPATIENT)
Dept: FAMILY MEDICINE CLINIC | Facility: CLINIC | Age: 82
End: 2022-12-13

## 2022-12-13 NOTE — TELEPHONE ENCOUNTER
Caller: Eric Dukes    Relationship: Self    Best call back number: [phone]      What was the call regarding: PATIENT STATES THAT HE WAS INFORMED BY [facility] THAT THEY DID NOT HAVE ALL THE NECESSARY INFORMATION FOR HIS TREATMENT. REQUESTS THAT HIS PCP CALL THEM TO FIGURE OUT WHAT ADDITIONAL INFORMATION THEY NEED    [facility] PHONE: [phone]

## 2022-12-15 NOTE — TELEPHONE ENCOUNTER
Lvm at ENT office.  The office notes have been faxed.  If there are additional forms or information we do not have it.

## 2022-12-27 NOTE — TELEPHONE ENCOUNTER
LAST SCOPE 1/17 IN Epic --Personal history of polyps--No family history of polyps or colon cancer--ASPIRIN --Medications:          allopurinol (ZYLOPRIM) 300 MG tablet  amLODIPine (NORVASC) 10 MG tablet  aspirin 81 MG tablet  Blood Glucose Monitoring Suppl (FREESTYLE LITE) device  glucose blood (FREESTYLE LITE) test strip  hydroCHLOROthiazide (HYDRODIURIL) 12.5 MG tablet  losartan (COZAAR) 50 MG tablet  metFORMIN (GLUCOPHAGE) 500 MG tablet  metoprolol tartrate (LOPRESSOR) 100 MG tablet  Multiple Vitamin (MULTIVITAMIN) tablet  Omega-3 Fatty Acids (FISH OIL) 1000 MG capsule capsule  omeprazole (priLOSEC) 20 MG capsule  omeprazole (priLOSEC) 20 MG capsule  simvastatin (ZOCOR) 40 MG tablet    QUESTIONNAIRE SCEEENING  HAS BEEN SENT TO DOCTOR FOR REVIEW

## 2023-01-10 ENCOUNTER — PREP FOR SURGERY (OUTPATIENT)
Dept: OTHER | Facility: HOSPITAL | Age: 83
End: 2023-01-10
Payer: MEDICARE

## 2023-01-10 DIAGNOSIS — Z86.010 HISTORY OF ADENOMATOUS POLYP OF COLON: Primary | ICD-10-CM

## 2023-02-01 ENCOUNTER — TELEPHONE (OUTPATIENT)
Dept: GASTROENTEROLOGY | Facility: CLINIC | Age: 83
End: 2023-02-01
Payer: MEDICARE

## 2023-02-01 NOTE — TELEPHONE ENCOUNTER
OK FOR HUB TO READ  FAMILIA patient via telephone for COLONOSCOPY. Scheduled 3/20/23 with arrival time of 1030AM. Prep paperwork mailed to verified address on file. Patient advised arrival time may change based on [facility] guidelines. FAMILIA PALM

## 2023-03-11 DIAGNOSIS — K21.9 GASTROESOPHAGEAL REFLUX DISEASE WITHOUT ESOPHAGITIS: ICD-10-CM

## 2023-03-13 RX ORDER — OMEPRAZOLE 20 MG/1
CAPSULE, DELAYED RELEASE ORAL
Qty: 30 CAPSULE | Refills: 0 | OUTPATIENT
Start: 2023-03-13

## 2023-03-20 ENCOUNTER — ANESTHESIA (OUTPATIENT)
Dept: GASTROENTEROLOGY | Facility: HOSPITAL | Age: 83
End: 2023-03-20
Payer: MEDICARE

## 2023-03-20 ENCOUNTER — HOSPITAL ENCOUNTER (OUTPATIENT)
Facility: HOSPITAL | Age: 83
Setting detail: HOSPITAL OUTPATIENT SURGERY
Discharge: HOME | End: 2023-03-20
Attending: INTERNAL MEDICINE | Admitting: INTERNAL MEDICINE
Payer: MEDICARE

## 2023-03-20 ENCOUNTER — ANESTHESIA EVENT (OUTPATIENT)
Dept: GASTROENTEROLOGY | Facility: HOSPITAL | Age: 83
End: 2023-03-20
Payer: MEDICARE

## 2023-03-20 VITALS
DIASTOLIC BLOOD PRESSURE: 62 MMHG | BODY MASS INDEX: 25.27 KG/M2 | HEIGHT: 67 IN | OXYGEN SATURATION: 95 % | RESPIRATION RATE: 19 BRPM | SYSTOLIC BLOOD PRESSURE: 137 MMHG | HEART RATE: 49 BPM | WEIGHT: 161 LBS | TEMPERATURE: 97.5 F

## 2023-03-20 DIAGNOSIS — Z86.010 HISTORY OF ADENOMATOUS POLYP OF COLON: ICD-10-CM

## 2023-03-20 LAB — GLUCOSE BLDC GLUCOMTR-MCNC: 155 MG/DL (ref 70–130)

## 2023-03-20 PROCEDURE — 45385 COLONOSCOPY W/LESION REMOVAL: CPT | Performed by: INTERNAL MEDICINE

## 2023-03-20 PROCEDURE — 82962 GLUCOSE BLOOD TEST: CPT

## 2023-03-20 PROCEDURE — S0260 H&P FOR SURGERY: HCPCS | Performed by: INTERNAL MEDICINE

## 2023-03-20 PROCEDURE — 88305 TISSUE EXAM BY PATHOLOGIST: CPT | Performed by: INTERNAL MEDICINE

## 2023-03-20 PROCEDURE — 25010000002 PROPOFOL 10 MG/ML EMULSION: Performed by: ANESTHESIOLOGY

## 2023-03-20 PROCEDURE — 25010000002 PHENYLEPHRINE 10 MG/ML SOLUTION: Performed by: ANESTHESIOLOGY

## 2023-03-20 RX ORDER — PROPOFOL 10 MG/ML
VIAL (ML) INTRAVENOUS AS NEEDED
Status: DISCONTINUED | OUTPATIENT
Start: 2023-03-20 | End: 2023-03-20 | Stop reason: SURG

## 2023-03-20 RX ORDER — SODIUM CHLORIDE 9 MG/ML
40 INJECTION, SOLUTION INTRAVENOUS AS NEEDED
Status: DISCONTINUED | OUTPATIENT
Start: 2023-03-20 | End: 2023-03-20 | Stop reason: HOSPADM

## 2023-03-20 RX ORDER — LIDOCAINE HYDROCHLORIDE 20 MG/ML
INJECTION, SOLUTION INFILTRATION; PERINEURAL AS NEEDED
Status: DISCONTINUED | OUTPATIENT
Start: 2023-03-20 | End: 2023-03-20 | Stop reason: SURG

## 2023-03-20 RX ORDER — PHENYLEPHRINE HYDROCHLORIDE 10 MG/ML
INJECTION INTRAVENOUS AS NEEDED
Status: DISCONTINUED | OUTPATIENT
Start: 2023-03-20 | End: 2023-03-20 | Stop reason: SURG

## 2023-03-20 RX ORDER — SODIUM CHLORIDE 0.9 % (FLUSH) 0.9 %
10 SYRINGE (ML) INJECTION EVERY 12 HOURS SCHEDULED
Status: DISCONTINUED | OUTPATIENT
Start: 2023-03-20 | End: 2023-03-20 | Stop reason: HOSPADM

## 2023-03-20 RX ORDER — SODIUM CHLORIDE, SODIUM LACTATE, POTASSIUM CHLORIDE, CALCIUM CHLORIDE 600; 310; 30; 20 MG/100ML; MG/100ML; MG/100ML; MG/100ML
30 INJECTION, SOLUTION INTRAVENOUS CONTINUOUS PRN
Status: DISCONTINUED | OUTPATIENT
Start: 2023-03-20 | End: 2023-03-20 | Stop reason: HOSPADM

## 2023-03-20 RX ORDER — SODIUM CHLORIDE 0.9 % (FLUSH) 0.9 %
10 SYRINGE (ML) INJECTION AS NEEDED
Status: DISCONTINUED | OUTPATIENT
Start: 2023-03-20 | End: 2023-03-20 | Stop reason: HOSPADM

## 2023-03-20 RX ADMIN — SODIUM CHLORIDE, POTASSIUM CHLORIDE, SODIUM LACTATE AND CALCIUM CHLORIDE 30 ML/HR: 600; 310; 30; 20 INJECTION, SOLUTION INTRAVENOUS at 10:21

## 2023-03-20 RX ADMIN — PHENYLEPHRINE HYDROCHLORIDE 100 MCG: 10 INJECTION INTRAVENOUS at 13:31

## 2023-03-20 RX ADMIN — LIDOCAINE HYDROCHLORIDE 100 MG: 20 INJECTION, SOLUTION INFILTRATION; PERINEURAL at 13:17

## 2023-03-20 RX ADMIN — PROPOFOL 50 MG: 10 INJECTION, EMULSION INTRAVENOUS at 13:17

## 2023-03-20 RX ADMIN — PROPOFOL 120 MCG/KG/MIN: 10 INJECTION, EMULSION INTRAVENOUS at 13:19

## 2023-03-20 NOTE — H&P
[facility]  Pre Procedure History & Physical    Chief Complaint:   History colon polyps    Subjective     HPI:   Patient 82-year-old male with history of hypertension, hyperlipidemia and diabetes here for history of colon polyps for colonoscopy.    Past Medical History:   Past Medical History:   Diagnosis Date   • Diabetes mellitus (HCC)     Type 2   • GERD (gastroesophageal reflux disease)    • Gout    • H/O complete eye exam 09/2015   • History of anemia    • Hx of colonic polyp    • Hyperlipidemia    • Hypertension    • Skin cancer        Past Surgical History:  Past Surgical History:   Procedure Laterality Date   • COLONOSCOPY  2011    dr leonila almendarez   • COLONOSCOPY N/A 1/18/2017    Procedure: COLONOSCOPY TO CECUM AND INTO TERMINAL ILEUM WITH COLD SNARE POLYPECTOMY;  Surgeon: Nba Hyman MD;  Location: [facility] ENDOSCOPY;  Service:    • COLONOSCOPY W/ POLYPECTOMY     • TONSILLECTOMY         Family History:  Family History   Problem Relation Age of Onset   • Cancer Other    • Diabetes Other    • Hypertension Other    • Stroke Other    • Colon cancer Paternal Grandfather    • Colon polyps Sister    • Cancer Sister    • Stroke Father    • Hypertension Father        Social History:   reports that he has quit smoking. His smoking use included cigarettes. He has a 30.00 pack-year smoking history. He has never used smokeless tobacco. He reports current alcohol use. He reports that he does not use drugs.    Medications:   Medications Prior to Admission   Medication Sig Dispense Refill Last Dose   • allopurinol (ZYLOPRIM) 300 MG tablet Take 1 tablet by mouth Daily.   3/19/2023   • amLODIPine (NORVASC) 10 MG tablet Take 1 tablet by mouth Daily. 90 tablet 1 3/20/2023   • aspirin 81 MG tablet Take 1 tablet by mouth Daily.   3/20/2023   • Blood Glucose Monitoring Suppl (FREESTYLE LITE) device Check blood sugar once per day 100 each 3 Past Week   • glucose blood (FREESTYLE LITE) test strip Test  "blood sugar once daily 100 each 3 Past Week   • losartan (COZAAR) 50 MG tablet Take 1 tablet by mouth Daily. 180 tablet 1 3/20/2023   • metFORMIN (GLUCOPHAGE) 500 MG tablet Take 1 tablet by mouth 3 (Three) Times a Day. 270 tablet 1 3/19/2023   • metoprolol tartrate (LOPRESSOR) 100 MG tablet Take 1 tablet by mouth 2 (Two) Times a Day. 180 tablet 1 3/20/2023   • Multiple Vitamin (MULTIVITAMIN) tablet Take 1 tablet by mouth Daily.   Past Week   • Omega-3 Fatty Acids (FISH OIL) 1000 MG capsule capsule Take 1 capsule by mouth 3 (Three) Times a Day With Meals.   3/19/2023   • omeprazole (priLOSEC) 20 MG capsule TAKE 1 CAPSULE EVERY DAY 28 capsule 0 Past Week   • omeprazole (priLOSEC) 20 MG capsule Take 1 capsule by mouth Daily. 90 capsule 1 Past Week   • simvastatin (ZOCOR) 40 MG tablet Take 1 tablet by mouth Every Night. 90 tablet 1 3/20/2023   • hydroCHLOROthiazide (HYDRODIURIL) 12.5 MG tablet Take 1 tablet by mouth Daily. 90 tablet 1        Allergies:  Patient has no known allergies.    ROS:    Pertinent items are noted in HPI     Objective     Blood pressure 140/71, pulse (!) 49, temperature 97.5 °F (36.4 °C), temperature source Oral, resp. rate 18, height 170.2 cm (67\"), weight 73 kg (161 lb), SpO2 96 %.    Physical Exam   Constitutional: Pt is oriented to person, place, and time and well-developed, well-nourished, and in no distress.   Mouth/Throat: Oropharynx is clear and moist.   Neck: Normal range of motion.   Cardiovascular: Normal rate, regular rhythm and normal heart sounds.    Pulmonary/Chest: Effort normal and breath sounds normal.   Abdominal: Soft. Nontender  Skin: Skin is warm and dry.   Psychiatric: Mood, memory, affect and judgment normal.     Assessment & Plan     Diagnosis:  History of colon polyps    Anticipated Surgical Procedure:  Colonoscopy    The risks, benefits, and alternatives of this procedure have been discussed with the patient or the responsible party- the patient understands and agrees " to proceed.

## 2023-03-20 NOTE — ANESTHESIA PREPROCEDURE EVALUATION
" Anesthesia Evaluation     Patient summary reviewed and Nursing notes reviewed   no history of anesthetic complications:  NPO Solid Status: > 8 hours  NPO Liquid Status: > 2 hours           Airway   Mallampati: II  Dental      Pulmonary    (+) a smoker Former,   Cardiovascular   Exercise tolerance: good (4-7 METS)    (+) hypertension, hyperlipidemia,       Neuro/Psych- negative ROS  GI/Hepatic/Renal/Endo    (+)  GERD,  renal disease CRI, diabetes mellitus type 2,     Musculoskeletal (-) negative ROS    Abdominal    Substance History - negative use     OB/GYN negative ob/gyn ROS         Other      history of cancer                    Anesthesia Plan    ASA 3     MAC     (/71 (BP Location: Left arm, Patient Position: Lying)   Pulse (!) 49   Temp 36.4 °C (97.5 °F) (Oral)   Resp 18   Ht 170.2 cm (67\")   Wt 73 kg (161 lb)   SpO2 96%   BMI 25.22 kg/m²     I have reviewed the patient's history with the patient and the chart, including all pertinent laboratory results and imaging. I have explained the risks of anesthesia including but not limited to dental damage, corneal abrasion, nerve injury, MI, stroke, and death.    )  intravenous induction     Anesthetic plan, risks, benefits, and alternatives have been provided, discussed and informed consent has been obtained with: patient.        CODE STATUS:       "

## 2023-03-20 NOTE — ANESTHESIA POSTPROCEDURE EVALUATION
"Patient: Eric Dukes    Procedure Summary     Date: 03/20/23 Room / Location:  [facility]    Anesthesia Start: 1311 Anesthesia Stop: 1338    Procedure: COLONOSCOPY with cold polypectomies Diagnosis:       History of adenomatous polyp of colon      Colon polyps      Internal hemorrhoids      (History of adenomatous polyp of colon [Z86.010])    Surgeons: Nba Hyman MD Provider: Eryn Kennedy MD    Anesthesia Type: MAC ASA Status: 3          Anesthesia Type: MAC    Vitals  Vitals Value Taken Time   BP 89/51 03/20/23 1340   Temp     Pulse 54 03/20/23 1344   Resp     SpO2 82 % 03/20/23 1344   Vitals shown include unvalidated device data.        Post Anesthesia Care and Evaluation    Patient location during evaluation: bedside  Patient participation: complete - patient participated  Level of consciousness: awake  Pain management: adequate    Airway patency: patent  Anesthetic complications: No anesthetic complications  PONV Status: controlled  Cardiovascular status: acceptable  Respiratory status: acceptable  Hydration status: acceptable    Comments: BP (!) 89/51   Pulse (!) 49   Temp 36.4 °C (97.5 °F) (Oral)   Resp 20   Ht 170.2 cm (67\")   Wt 73 kg (161 lb)   SpO2 93%   BMI 25.22 kg/m²         "

## 2023-03-20 NOTE — BRIEF OP NOTE
COLONOSCOPY  Progress Note    Eric Dukes  3/20/2023    Pre-op Diagnosis:   History of adenomatous polyp of colon [Z86.010]       Post-Op Diagnosis Codes:     * History of adenomatous polyp of colon [Z86.010]     * Colon polyps [K63.5]     * Internal hemorrhoids [K64.8]    Procedure/CPT® Codes:        Procedure(s):  COLONOSCOPY with cold polypectomies        Surgeon(s):  Nba Hyman MD    Anesthesia: Monitored Anesthesia Care    Staff:   Endo Technician: Christina Islas; Alphonso Beckwith  Endo Nurse: Nancy Purdy RN; Penny Burgess RN         Estimated Blood Loss: minimal    Urine Voided: * No values recorded between 3/20/2023  1:11 PM and 3/20/2023  1:38 PM *    Specimens:                Specimens     ID Source Type Tests Collected By Collected At Frozen?    A Large Intestine, Transverse Colon Polyp · TISSUE PATHOLOGY EXAM   Nba Hyman MD 3/20/23 1808     Description: polyps x3                Drains: * No LDAs found *    Findings: Colonoscopy terminal ileum with normal ileum mucosa.  Transverse colon polyps x3 removed cold snare.  Internal hemorrhoids were identified but the remainder the colonic mucosa was normal.        Complications: None          Nba Hyman MD     Date: 3/20/2023  Time: 13:40 EDT

## 2023-03-21 LAB
LAB AP CASE REPORT: NORMAL
PATH REPORT.FINAL DX SPEC: NORMAL
PATH REPORT.GROSS SPEC: NORMAL

## 2023-03-28 DIAGNOSIS — E11.9 TYPE 2 DIABETES MELLITUS WITHOUT COMPLICATION, WITHOUT LONG-TERM CURRENT USE OF INSULIN: ICD-10-CM

## 2023-03-30 NOTE — TELEPHONE ENCOUNTER
Rx Refill Note  Requested Prescriptions     Pending Prescriptions Disp Refills   • metFORMIN (GLUCOPHAGE) 500 MG tablet [Pharmacy Med Name: METFORMIN HYDROCHLORIDE 500 MG Tablet] 270 tablet 1     Sig: TAKE 1 TABLET THREE TIMES DAILY      Last office visit with prescribing clinician: 11/1/2022   Last telemedicine visit with prescribing clinician: 4/5/2023   Next office visit with prescribing clinician: 4/5/2023

## 2023-04-14 ENCOUNTER — OFFICE VISIT (OUTPATIENT)
Dept: FAMILY MEDICINE CLINIC | Facility: CLINIC | Age: 83
End: 2023-04-14
Payer: MEDICARE

## 2023-04-14 VITALS
SYSTOLIC BLOOD PRESSURE: 128 MMHG | RESPIRATION RATE: 16 BRPM | OXYGEN SATURATION: 98 % | TEMPERATURE: 97.5 F | BODY MASS INDEX: 26.37 KG/M2 | DIASTOLIC BLOOD PRESSURE: 60 MMHG | WEIGHT: 168 LBS | HEART RATE: 57 BPM | HEIGHT: 67 IN

## 2023-04-14 DIAGNOSIS — E11.9 TYPE 2 DIABETES MELLITUS WITHOUT COMPLICATION, WITHOUT LONG-TERM CURRENT USE OF INSULIN: ICD-10-CM

## 2023-04-14 DIAGNOSIS — I10 ESSENTIAL HYPERTENSION: ICD-10-CM

## 2023-04-14 DIAGNOSIS — K21.9 GASTROESOPHAGEAL REFLUX DISEASE WITHOUT ESOPHAGITIS: ICD-10-CM

## 2023-04-14 DIAGNOSIS — M21.371 ACQUIRED RIGHT FOOT DROP: ICD-10-CM

## 2023-04-14 DIAGNOSIS — Z00.00 MEDICARE ANNUAL WELLNESS VISIT, SUBSEQUENT: Primary | ICD-10-CM

## 2023-04-14 RX ORDER — OMEPRAZOLE 20 MG/1
20 CAPSULE, DELAYED RELEASE ORAL DAILY
Qty: 90 CAPSULE | Refills: 1 | Status: SHIPPED | OUTPATIENT
Start: 2023-04-14

## 2023-04-14 RX ORDER — TAMSULOSIN HYDROCHLORIDE 0.4 MG/1
1 CAPSULE ORAL DAILY
COMMUNITY

## 2023-04-14 NOTE — PROGRESS NOTES
"Subjective   Eric Dukes is a 82 y.o. male.     History of Present Illness    Since the last visit, he has overall felt fairly well.  He has Primary Hypertension and well controlled on current medication, DMII well controlled on medication and will continue regimen and GERD controlled on PPI Rx.  he has been compliant with current medications have reviewed them.  The patient denies medication side effects.  Will refill medications. /60   Pulse 57   Temp 97.5 °F (36.4 °C)   Resp 16   Ht 170.2 cm (67\")   Wt 76.2 kg (168 lb)   SpO2 98%   BMI 26.31 kg/m²     Results for orders placed or performed during the hospital encounter of 03/20/23   POC Glucose Once    Specimen: Blood   Result Value Ref Range    Glucose 155 (H) 70 - 130 mg/dL   Tissue Pathology Exam    Specimen: Large Intestine, Transverse Colon; Polyp   Result Value Ref Range    Case Report       Surgical Pathology Report                         Case: [id number]                                  Authorizing Provider:  Nba Hyman MD    Collected:           03/20/2023 01:29 PM          Ordering Location:     [facility]  Received:            03/20/2023 02:33 PM                                 ENDO SUITES                                                                  Pathologist:           Lui Morrison MD                                                         Specimen:    Large Intestine, Transverse Colon, polyps x3                                               Final Diagnosis       1. Transverse Colon Polyps x3 Biopsy:   A. Fragments of tubular adenoma and focally eroded hyperplastic polyp with inflamed granulation tissue.  B. No high-grade dysplasia nor malignancy identified.     radhat/patricia       Gross Description       1. Large Intestine, Transverse Colon.  The specimen is received in formalin labeled with the patient's name and further designated 'transverse colon polyps biopsy' are multiple small fragments of " gray-tan tissue. The specimen is submitted for embedding as received.             Reports right foot drop/weakness for the past couple of years.  Denies associated back pain.  He would like to see physical therapy for this.   The following portions of the patient's history were reviewed and updated as appropriate: allergies, current medications, past family history, past medical history, past social history, past surgical history and problem list.    Review of Systems   Constitutional: Negative for fatigue.   Respiratory: Negative for cough and shortness of breath.    Cardiovascular: Negative for chest pain and palpitations.   Endocrine: Negative for cold intolerance, heat intolerance, polydipsia, polyphagia and polyuria.   Skin: Negative for rash.   Psychiatric/Behavioral: Negative for dysphoric mood and sleep disturbance. The patient is not nervous/anxious.        Objective   Physical Exam  Vitals and nursing note reviewed.   Constitutional:       Appearance: Normal appearance. He is well-developed.   Neck:      Vascular: No carotid bruit.   Cardiovascular:      Rate and Rhythm: Normal rate and regular rhythm.   Pulmonary:      Effort: Pulmonary effort is normal.      Breath sounds: Normal breath sounds.   Musculoskeletal:      Right foot: Foot drop present.   Feet:      Comments: Right foot dorsiflexion weaker than left   Neurological:      Mental Status: He is alert and oriented to person, place, and time.   Psychiatric:         Mood and Affect: Mood normal.         Behavior: Behavior normal.         Thought Content: Thought content normal.         Judgment: Judgment normal.         Assessment & Plan   Diagnoses and all orders for this visit:    1. Medicare annual wellness visit, subsequent (Primary)    2. Type 2 diabetes mellitus without complication, without long-term current use of insulin    3. Essential hypertension    4. Gastroesophageal reflux disease without esophagitis  -     omeprazole (priLOSEC) 20 MG  capsule; Take 1 capsule by mouth Daily.  Dispense: 90 capsule; Refill: 1    5. Acquired right foot drop  -     Ambulatory Referral to Physical Therapy Evaluate and treat        Discussed that he may need MRI and referral to back specialist depending on evaluation per PT.

## 2023-04-14 NOTE — PATIENT INSTRUCTIONS
Medicare Wellness  Personal Prevention Plan of Service     Date of Office Visit:    Encounter Provider:  SHAJI Linn  Place of Service:  [facility]  Patient Name: Eric Dukes  :  1940    As part of the Medicare Wellness portion of your visit today, we are providing you with this personalized preventive plan of services (PPPS). This plan is based upon recommendations of the [place] Preventive Services Task Force (USPSTF) and the Advisory Committee on Immunization Practices (ACIP).    This lists the preventive care services that should be considered, and provides dates of when you are due. Items listed as completed are up-to-date and do not require any further intervention.    Health Maintenance   Topic Date Due    ZOSTER VACCINE (2 of 2) 2009    HEMOGLOBIN A1C  2022    DIABETIC EYE EXAM  02/15/2023    LIPID PANEL  2023    URINE MICROALBUMIN  2023    INFLUENZA VACCINE  2023    DIABETIC FOOT EXAM  10/25/2023    ANNUAL WELLNESS VISIT  2024    TDAP/TD VACCINES (2 - Td or Tdap) 2026    COLORECTAL CANCER SCREENING  2028    COVID-19 Vaccine  Completed    Pneumococcal Vaccine 65+  Completed       Orders Placed This Encounter   Procedures    Ambulatory Referral to Physical Therapy Evaluate and treat     Referral Priority:   Routine     Referral Type:   Physical Therapy     Referral Reason:   Specialty Services Required     Requested Specialty:   Physical Therapy     Number of Visits Requested:   1       Return in 6 months (on 10/14/2023), or if symptoms worsen or fail to improve, for Next scheduled follow up.

## 2023-04-14 NOTE — PROGRESS NOTES
The ABCs of the Annual Wellness Visit  Subsequent Medicare Wellness Visit    Subjective    Eric Dukes is a 82 y.o. male who presents for a Subsequent Medicare Wellness Visit.    The following portions of the patient's history were reviewed and   updated as appropriate: allergies, current medications, past family history, past medical history, past social history, past surgical history and problem list.    Compared to one year ago, the patient feels his physical   health is the same.    Compared to one year ago, the patient feels his mental   health is the same.    Recent Hospitalizations:  He was not admitted to the hospital during the last year.       Current Medical Providers:  Patient Care Team:  Verena Plata APRN ([place]) as PCP - General (Family Medicine)  Eric Tang MD as Referring Physician (Family Medicine)  Yair Rodriguez MD as Consulting Physician (Hematology and Oncology)  Erik Cooper MD as Consulting Physician (Rheumatology)  Denise Brown MD as Consulting Physician (Nephrology)    Outpatient Medications Prior to Visit   Medication Sig Dispense Refill   • allopurinol (ZYLOPRIM) 300 MG tablet Take 1 tablet by mouth Daily.     • amLODIPine (NORVASC) 10 MG tablet Take 1 tablet by mouth Daily. 90 tablet 1   • aspirin 81 MG tablet Take 1 tablet by mouth Daily.     • Blood Glucose Monitoring Suppl (FREESTYLE LITE) device Check blood sugar once per day 100 each 3   • glucose blood (FREESTYLE LITE) test strip Test blood sugar once daily 100 each 3   • losartan (COZAAR) 50 MG tablet Take 1 tablet by mouth Daily. 180 tablet 1   • metFORMIN (GLUCOPHAGE) 500 MG tablet TAKE 1 TABLET THREE TIMES DAILY 270 tablet 1   • metoprolol tartrate (LOPRESSOR) 100 MG tablet Take 1 tablet by mouth 2 (Two) Times a Day. 180 tablet 1   • Multiple Vitamin (MULTIVITAMIN) tablet Take 1 tablet by mouth Daily.     • Omega-3 Fatty Acids (FISH OIL) 1000 MG capsule capsule Take 1 capsule by mouth 3 (Three) Times a Day  "With Meals.     • simvastatin (ZOCOR) 40 MG tablet Take 1 tablet by mouth Every Night. 90 tablet 1   • tamsulosin (FLOMAX) 0.4 MG capsule 24 hr capsule Take 1 capsule by mouth Daily.     • omeprazole (priLOSEC) 20 MG capsule Take 1 capsule by mouth Daily. 90 capsule 1   • hydroCHLOROthiazide (HYDRODIURIL) 12.5 MG tablet Take 1 tablet by mouth Daily. 90 tablet 1   • omeprazole (priLOSEC) 20 MG capsule TAKE 1 CAPSULE EVERY DAY 28 capsule 0     No facility-administered medications prior to visit.       No opioid medication identified on active medication list. I have reviewed chart for other potential  high risk medication/s and harmful drug interactions in the elderly.          Aspirin is on active medication list. Aspirin use is indicated based on review of current medical condition/s. Pros and cons of this therapy have been discussed today. Benefits of this medication outweigh potential harm.  Patient has been encouraged to continue taking this medication.  .      Patient Active Problem List   Diagnosis   • Hyperlipidemia   • Essential hypertension   • Gout   • Type 2 diabetes mellitus without complication   • Gastroesophageal reflux disease without esophagitis   • History of adenomatous polyp of colon   • History of iron deficiency anemia   • Bence Burger proteinuria   • Chronic kidney disease, stage 2 (mild)   • Hypercalcemia   • Hyperkalemia   • Essential (primary) hypertension   • Hypertensive chronic kidney disease with stage 1 through stage 4 chronic kidney disease, or unspecified chronic kidney disease   • Gout     Advance Care Planning   Advance Care Planning     Advance Directive is not on file.       Objective    Vitals:    04/14/23 0920   BP: 128/60   Pulse: 57   Resp: 16   Temp: 97.5 °F (36.4 °C)   SpO2: 98%   Weight: 76.2 kg (168 lb)   Height: 170.2 cm (67\")     Estimated body mass index is 26.31 kg/m² as calculated from the following:    Height as of this encounter: 170.2 cm (67\").    Weight as of this " encounter: 76.2 kg (168 lb).    BMI is >= 25 and <30. (Overweight) The following options were offered after discussion;: exercise counseling/recommendations and nutrition counseling/recommendations      Does the patient have evidence of cognitive impairment?   No            HEALTH RISK ASSESSMENT    Smoking Status:  Social History     Tobacco Use   Smoking Status Former   • Packs/day: 1.00   • Years: 30.00   • Pack years: 30.00   • Types: Cigarettes   Smokeless Tobacco Never     Alcohol Consumption:  Social History     Substance and Sexual Activity   Alcohol Use Yes    Comment: social     Fall Risk Screen:    STEADI Fall Risk Assessment was completed, and patient is at LOW risk for falls.Assessment completed on:2023    Depression Screenin/14/2023     9:00 AM   PHQ-2/PHQ-9 Depression Screening   Little Interest or Pleasure in Doing Things 0-->not at all   Feeling Down, Depressed or Hopeless 0-->not at all   PHQ-9: Brief Depression Severity Measure Score 0       Health Habits and Functional and Cognitive Screenin/14/2023     9:00 AM   Functional & Cognitive Status   Do you have difficulty preparing food and eating? No   Do you have difficulty bathing yourself, getting dressed or grooming yourself? No   Do you have difficulty using the toilet? No   Do you have difficulty moving around from place to place? No   Do you have trouble with steps or getting out of a bed or a chair? No   Current Diet Diabetic Diet   Dental Exam Up to date   Eye Exam Up to date   Exercise (times per week) 0 times per week   Current Exercises Include No Regular Exercise   Do you need help using the phone?  No   Are you deaf or do you have serious difficulty hearing?  No   Do you need help with transportation? No   Do you need help shopping? No   Do you need help preparing meals?  No   Do you need help with housework?  No   Do you need help with laundry? No   Do you need help taking your medications? No   Do you need help  managing money? No   Do you ever drive or ride in a car without wearing a seat belt? No   Have you felt unusual stress, anger or loneliness in the last month? No   Who do you live with? Alone   If you need help, do you have trouble finding someone available to you? No   Have you been bothered in the last four weeks by sexual problems? No   Do you have difficulty concentrating, remembering or making decisions? No       Age-appropriate Screening Schedule:  Refer to the list below for future screening recommendations based on patient's age, sex and/or medical conditions. Orders for these recommended tests are listed in the plan section. The patient has been provided with a written plan.    Health Maintenance   Topic Date Due   • ZOSTER VACCINE (2 of 2) 11/17/2009   • ANNUAL WELLNESS VISIT  Never done   • HEMOGLOBIN A1C  09/09/2022   • DIABETIC EYE EXAM  02/15/2023   • LIPID PANEL  03/09/2023   • URINE MICROALBUMIN  03/09/2023   • INFLUENZA VACCINE  08/01/2023   • DIABETIC FOOT EXAM  10/25/2023   • TDAP/TD VACCINES (2 - Td or Tdap) 08/08/2026   • COLORECTAL CANCER SCREENING  03/20/2028   • COVID-19 Vaccine  Completed   • Pneumococcal Vaccine 65+  Completed                  CMS Preventative Services Quick Reference  Risk Factors Identified During Encounter:    None Identified    The above risks/problems have been discussed with the patient.  Pertinent information has been shared with the patient in the After Visit Summary.    Diagnoses and all orders for this visit:    1. Medicare annual wellness visit, subsequent (Primary)    2. Type 2 diabetes mellitus without complication, without long-term current use of insulin    3. Essential hypertension    4. Gastroesophageal reflux disease without esophagitis  -     omeprazole (priLOSEC) 20 MG capsule; Take 1 capsule by mouth Daily.  Dispense: 90 capsule; Refill: 1    5. Acquired right foot drop  -     Ambulatory Referral to Physical Therapy Evaluate and treat        Follow Up:    Next Medicare Wellness visit to be scheduled in 1 year.      An After Visit Summary and PPPS were made available to the patient.

## 2023-04-20 ENCOUNTER — TREATMENT (OUTPATIENT)
Dept: PHYSICAL THERAPY | Facility: CLINIC | Age: 83
End: 2023-04-20
Payer: MEDICARE

## 2023-04-20 DIAGNOSIS — R26.9 GAIT DIFFICULTY: ICD-10-CM

## 2023-04-20 DIAGNOSIS — M21.372 FOOT DROP, BILATERAL: Primary | ICD-10-CM

## 2023-04-20 DIAGNOSIS — M21.371 FOOT DROP, BILATERAL: Primary | ICD-10-CM

## 2023-04-20 DIAGNOSIS — R26.89 IMBALANCE: ICD-10-CM

## 2023-04-20 PROCEDURE — 97112 NEUROMUSCULAR REEDUCATION: CPT | Performed by: PHYSICAL THERAPIST

## 2023-04-20 PROCEDURE — 97110 THERAPEUTIC EXERCISES: CPT | Performed by: PHYSICAL THERAPIST

## 2023-04-20 PROCEDURE — 97161 PT EVAL LOW COMPLEX 20 MIN: CPT | Performed by: PHYSICAL THERAPIST

## 2023-04-20 NOTE — PROGRESS NOTES
Physical Therapy Initial Evaluation and Plan of Care    [facility]  [address] [place] [address]  [phone] (phone)  [phone] (fax)      Patient: Eric Dukes   : 1940  Diagnosis/ICD-10 Code:  Foot drop, bilateral [M21.371, M21.372]  Referring practitioner: Verena Plata ([place]), *  Date of Initial Visit: 2023  Today's Date: 2023  Patient seen for 1 sessions           Subjective Evaluation    History of Present Illness  Mechanism of injury: No testing yet of feet. No symptoms.   Other people started noticing him walking.   No back issues, no back pain.   Reports one fall a month ago- no injuries, turning tends to lose balance.   No issue with stairs down basement, no falls.   Likes to garden, no issues with feet with that. Someone mows grass.   Doesn't notice issue with uneven surface.   Throw rugs can trip up some.     Subjective comment: Reports foot drop started over last two years, worsened over last year. No known cause. Possibly neuropathy- type II diabetes. It is under control, 105-135.   Patient Occupation: retired Social Support  Lives in: one-story house (with basement, laundry)    Patient Goals  Patient goal: Decrease foot drop, know cause           Objective          Neurological Testing     Sensation     Ankle/Foot   Left Ankle/Foot   Intact: proprioception  Diminished: light touch  Paresthesia: light touch    Right Ankle/Foot   Diminished: light touch and proprioception  Paresthesia: light touch    Active Range of Motion   Left Ankle/Foot   Inversion: 35 degrees   Eversion: 20 degrees     Right Ankle/Foot   Inversion: 5 degrees   Eversion: 15 degrees     Passive Range of Motion     Additional Passive Range of Motion Details  No motion with DF in sitting but palpable ant tib activation B,   Unable to toe or heel walk  SLS <1 second without UE support B (R better than L)    Lacking 22 deg from neutral on L of DF, lacking 35  on L     Limited passive AROM with DF    Strength/Myotome Testing     Left Ankle/Foot   Dorsiflexion: 2-  Plantar flexion: 3+    Right Ankle/Foot   Dorsiflexion: 2-  Plantar flexion: 3+    Additional Strength Details  PF tested in sitting  Unable to raise on toes in standing, able in sittinh        See Exercise, Manual, and Modality Logs for complete treatment.       Functional Outcome Score: LEFS 36/80      Assessment & Plan     Assessment  Impairments: abnormal coordination, abnormal gait, abnormal or restricted ROM, activity intolerance, impaired balance, impaired physical strength, lacks appropriate home exercise program and pain with function  Functional Limitations: walking and standing  Assessment details: Eric Dukes is a 83 y.o. year-old male referred to physical therapy for B drop foot of unknown cause. Pt has diabetic neuropathy but has not had nerve testing to date. He presents with a evolving clinical presentation.  He has comorbidities include diabetes and no personal factors  that may affect his progress in the plan of care.  Signs and symptoms are consistent with physical therapy diagnosis of B foot drop secondary to diabetic neuropathy vs possible central nerve issue like stenosis.       Goals  Plan Goals: STGs to be met by 2 weeks  1.) Pt will be independent and compliant with initial home exercise program.   2.) Pt will increase DF AROM in supine to lacking less than 10 deg from neutral B indicating improved strength and flexibility.  3.) Pt will report no falls.     LTGs to be met by 4 weeks  1.) Pt will be independent and compliant with advanced home exercise program.   2.) Pt will score >/= 46/80 on LEFS indicating decreased perceived functional disability.   3.)Pt will increase DF AROM in supine to neutral B indicating improved strength and flexibility.  4.) Pt will report no falls.       Plan  Therapy options: will be seen for skilled therapy services  Planned therapy interventions:  abdominal trunk stabilization, balance/weight-bearing training, body mechanics training, fine motor coordination training, flexibility, functional ROM exercises, gait training, home exercise program, joint mobilization, therapeutic activities, stretching, strengthening, spinal/joint mobilization, soft tissue mobilization, orthotic fitting/training, neuromuscular re-education, manual therapy and motor coordination training  Frequency: 2x week  Duration in visits: 12  Duration in weeks: 20  Treatment plan discussed with: patient  Plan details: Assess response to initial HEP. Focus on ankle strengthening, advance to eccentric focus if able as well as against gravity. Also work on balance exercises. Refer for NCV and/or EMG testing if no response to therapy (per APRN note, that is what the plan is)        Timed:  Manual Therapy:         mins  48319;  Therapeutic Exercise:    16     mins  36807;     Neuromuscular Herb:    14    mins  25375;    Therapeutic Activity:          mins  03681;     Gait Training:           mins  20715;     Ultrasound:          mins  19494;    Iontophoresis         mins 42683      Untimed:  Electrical Stimulation:         mins  30111 ( );  Traction:       mins  90916;   Low Eval     15     Mins  53194  Mod Eval          Mins  86233  High Eval                            Mins  52077  Dry Needling  (1-2 muscles)                 mins 00153 (Self-pay)  Dry Needling (3-4 muscles)      mins 86443 (Self-pay)  Dry Needling Trial         mins DRYNDLTRIAL  (No Charge)      Timed Treatment:  30    mins   Total Treatment:     45   mins    PT SIGNATURE: NATE Paul License Number: [id number]    Electronically signed by Jasmin Borges, PT, 04/20/23, 2:43 PM EDT    DATE TREATMENT INITIATED: 4/20/2023    Initial Certification  Certification Period: 7/19/2023  I certify that the therapy services are furnished while this patient is under my care.  The services outlined above are required by  this patient, and will be reviewed every 90 days.     PHYSICIAN: Verena Plata APRN ([place])   NPI: [id number]                                         DATE:     Please sign and return via fax to [phone] Thank you, [facility].

## 2023-04-25 ENCOUNTER — TREATMENT (OUTPATIENT)
Dept: PHYSICAL THERAPY | Facility: CLINIC | Age: 83
End: 2023-04-25
Payer: MEDICARE

## 2023-04-25 DIAGNOSIS — M21.372 FOOT DROP, BILATERAL: Primary | ICD-10-CM

## 2023-04-25 DIAGNOSIS — R26.9 GAIT DIFFICULTY: ICD-10-CM

## 2023-04-25 DIAGNOSIS — R26.89 IMBALANCE: ICD-10-CM

## 2023-04-25 DIAGNOSIS — M21.371 FOOT DROP, BILATERAL: Primary | ICD-10-CM

## 2023-04-25 NOTE — PROGRESS NOTES
Physical Therapy Daily Treatment Note  Visit # 2        Patient: Eric Dukes   : 1940  Referring practitioner: Verena Plata ([place]), *  Date of Initial Evaluation:  Type: THERAPY  Noted: 2023  Today's Date: 2023           ICD-10-CM ICD-9-CM   1. Foot drop, bilateral  M21.371 736.79    M21.372    2. Imbalance  R26.89 781.2   3. Gait difficulty  R26.9 781.2       Subjective  Eric Dukes reports:   No significant changes from status at last PT visit through today.    Objective   See Exercise, Manual, and Modality Logs for complete treatment.   Reviewed current HEP, progressed therex program with exercises as noted.  Added toe crunches, self-stretch of toes to HEP, written instructions issued ([employer] code [id number]).    Assessment/Plan  Tolerated continued progression of therapeutic exercise/therapeutic activity well today, no increased pain reported during or after exercises.  We discussed possibly wearing lighter shoes/sneakers as pt wearing heavier soled shoes today, potentially contributing to ant tibialis fatigue.    Would continue to benefit from skilled PT progressing with functional WB and strength.        Progress per Plan of Care and Progress strengthening /stabilization /functional activity           Timed:         Manual Therapy:         mins  01321     Therapeutic Exercise:     15    mins  99898     Neuromuscular Herb:    24    mins  47556    Therapeutic Activity:          mins  01890     Gait Training:           mins  80802     Ultrasound:          mins  04528    Ionto                                   mins  16684  Self Care                            mins  29788    Un-Timed:  Electrical Stimulation:         mins 12608 ( )  Traction          mins 31956    Timed Treatment:   39   mins   Total Treatment:     39   mins    Gabe Vogel PTA  [place] License #[id number]  Physical Therapist Assistant

## 2023-04-28 ENCOUNTER — TREATMENT (OUTPATIENT)
Dept: PHYSICAL THERAPY | Facility: CLINIC | Age: 83
End: 2023-04-28
Payer: MEDICARE

## 2023-04-28 DIAGNOSIS — M21.372 FOOT DROP, BILATERAL: Primary | ICD-10-CM

## 2023-04-28 DIAGNOSIS — M21.371 FOOT DROP, BILATERAL: Primary | ICD-10-CM

## 2023-04-28 DIAGNOSIS — R26.89 IMBALANCE: ICD-10-CM

## 2023-04-28 DIAGNOSIS — R26.9 GAIT DIFFICULTY: ICD-10-CM

## 2023-04-28 PROCEDURE — 97530 THERAPEUTIC ACTIVITIES: CPT | Performed by: PHYSICAL THERAPIST

## 2023-04-28 PROCEDURE — 97112 NEUROMUSCULAR REEDUCATION: CPT | Performed by: PHYSICAL THERAPIST

## 2023-04-28 PROCEDURE — 97110 THERAPEUTIC EXERCISES: CPT | Performed by: PHYSICAL THERAPIST

## 2023-04-28 NOTE — PROGRESS NOTES
Physical Therapy Daily Treatment Note  Visit # 3        Patient: Eric Dukes   : 1940  Referring practitioner: Verena Plata ([place]), *  Date of Initial Evaluation:  Type: THERAPY  Noted: 2023  Today's Date: 2023           ICD-10-CM ICD-9-CM   1. Foot drop, bilateral  M21.371 736.79    M21.372    2. Imbalance  R26.89 781.2   3. Gait difficulty  R26.9 781.2       Subjective  Eric Dukes reports:   I could tell that I did something last time, my legs were tired afterward.  Pt wearing sneakers today, as requested at last visit.       Objective   See Exercise, Manual, and Modality Logs for complete treatment.   Reviewed current HEP, progressed therex program with exercises as noted.    Assessment/Plan  Tolerated continued progression of therapeutic exercise/therapeutic activity well today, adding sit to stand, SLS balance to program, SLS to HEP.     Would continue to benefit from skilled PT progressing with functional WB and strength.      Progress per Plan of Care and Progress strengthening /stabilization /functional activity      Timed:         Manual Therapy:         mins  22041     Therapeutic Exercise:     15    mins  97911     Neuromuscular Herb:    20    mins  49553    Therapeutic Activity:      10    mins  84175     Gait Training:           mins  83951     Ultrasound:          mins  98508    Ionto                                   mins  38826  Self Care                            mins  89782    Un-Timed:  Electrical Stimulation:         mins 65536 ( )  Traction          mins 53969    Timed Treatment:   45   mins   Total Treatment:     45   mins    Gabe Vogel PTA  [place] License #[id number]  Physical Therapist Assistant

## 2023-05-01 ENCOUNTER — TREATMENT (OUTPATIENT)
Dept: PHYSICAL THERAPY | Facility: CLINIC | Age: 83
End: 2023-05-01
Payer: MEDICARE

## 2023-05-01 DIAGNOSIS — R26.9 GAIT DIFFICULTY: ICD-10-CM

## 2023-05-01 DIAGNOSIS — M21.371 FOOT DROP, BILATERAL: Primary | ICD-10-CM

## 2023-05-01 DIAGNOSIS — M21.372 FOOT DROP, BILATERAL: Primary | ICD-10-CM

## 2023-05-01 DIAGNOSIS — R26.89 IMBALANCE: ICD-10-CM

## 2023-05-01 PROCEDURE — 97110 THERAPEUTIC EXERCISES: CPT | Performed by: PHYSICAL THERAPIST

## 2023-05-01 PROCEDURE — 97530 THERAPEUTIC ACTIVITIES: CPT | Performed by: PHYSICAL THERAPIST

## 2023-05-01 PROCEDURE — 97112 NEUROMUSCULAR REEDUCATION: CPT | Performed by: PHYSICAL THERAPIST

## 2023-05-01 NOTE — PROGRESS NOTES
Physical Therapy Daily Treatment Note  Visit # 4        Patient: Eric Dukes   : 1940  Referring practitioner: Verena Plata ([place]), *  Date of Initial Evaluation:  Type: THERAPY  Noted: 2023  Today's Date: 2023           ICD-10-CM ICD-9-CM   1. Foot drop, bilateral  M21.371 736.79    M21.372    2. Imbalance  R26.89 781.2   3. Gait difficulty  R26.9 781.2       Subjective  Eric Dukes reports:   I could feel the effects of the exercises last time.      Objective   See Exercise, Manual, and Modality Logs for complete treatment.   Reviewed current HEP, progressed therex program with exercises as noted.  Added assisted ankle DF with yellow TB, resisted ankle PF with green TB to HEP, written instructions issued ([employer] code [id number]).    Assessment/Plan  Tolerated continued progression of therapeutic exercise/therapeutic activity well today, increasing BAPS level to 2, HEP progressions as noted.   Would continue to benefit from skilled PT progressing with functional WB and strength.      Progress per Plan of Care and Progress strengthening /stabilization /functional activity      Timed:         Manual Therapy:         mins  74152     Therapeutic Exercise:     15    mins  48464     Neuromuscular Herb:    20    mins  41574    Therapeutic Activity:      10    mins  68133     Gait Training:           mins  72175     Ultrasound:          mins  09571    Ionto                                   mins  11482  Self Care                            mins  15374    Un-Timed:  Electrical Stimulation:         mins 47953 ( )  Traction          mins 19775    Timed Treatment:   45   mins   Total Treatment:     45   mins    Gabe Vogel PTA  [place] License #[id number]  Physical Therapist Assistant

## 2023-05-04 ENCOUNTER — TREATMENT (OUTPATIENT)
Dept: PHYSICAL THERAPY | Facility: CLINIC | Age: 83
End: 2023-05-04
Payer: MEDICARE

## 2023-05-04 DIAGNOSIS — R26.89 IMBALANCE: ICD-10-CM

## 2023-05-04 DIAGNOSIS — M21.372 FOOT DROP, BILATERAL: Primary | ICD-10-CM

## 2023-05-04 DIAGNOSIS — R26.9 GAIT DIFFICULTY: ICD-10-CM

## 2023-05-04 DIAGNOSIS — M21.371 FOOT DROP, BILATERAL: Primary | ICD-10-CM

## 2023-05-04 NOTE — PROGRESS NOTES
Physical Therapy Daily Treatment Note  Visit # 5        Patient: Eric Dukes   : 1940  Referring practitioner: Verena Plata ([place]), *  Date of Initial Evaluation:  Type: THERAPY  Noted: 2023  Today's Date: 2023           ICD-10-CM ICD-9-CM   1. Foot drop, bilateral  M21.371 736.79    M21.372    2. Imbalance  R26.89 781.2   3. Gait difficulty  R26.9 781.2       Subjective  Eric Dukes reports:   Doing well today, no specific c/o in feet/LEs at onset of today's visit.     Objective   See Exercise, Manual, and Modality Logs for complete treatment.     Assessment & Plan     Assessment    Assessment details: Tolerated continued progression of therapeutic exercise/therapeutic activity well today.     Would continue to benefit from skilled PT progressing with functional WB and strength.  Goals reviewed today, has met 1/3 STG thus far, progressing toward remaining STG.       Goals  Plan Goals: STGs to be met by 2 weeks  1.) Pt will be independent and compliant with initial home exercise program - MET   2.) Pt will increase DF AROM in supine to lacking less than 10 deg from neutral B indicating improved strength and flexibility.  3.) Pt will report no falls - PROGRESSING (no falls over the past 7 days (since ))    LTGs to be met by 4 weeks  1.) Pt will be independent and compliant with advanced home exercise program.   2.) Pt will score >/= 46/80 on LEFS indicating decreased perceived functional disability.   3.)Pt will increase DF AROM in supine to neutral B indicating improved strength and flexibility.  4.) Pt will report no falls.           Progress per Plan of Care and Progress strengthening /stabilization /functional activity, advance HEP next week, progress balance activity as elizabeth.       Timed:         Manual Therapy:         mins  87864     Therapeutic Exercise:     15    mins  25990     Neuromuscular Herb:    12    mins  16010    Therapeutic Activity:      15    mins  14183     Gait  Training:           mins  82510     Ultrasound:          mins  46197    Ionto                                   mins  10632  Self Care                            mins  90088    Un-Timed:  Electrical Stimulation:         mins 35417 ( )  Traction          mins 13206    Timed Treatment:   42   mins   Total Treatment:     42   mins    ALPHONSO Matias License #[id number]  Physical Therapist Assistant

## 2023-05-09 ENCOUNTER — TREATMENT (OUTPATIENT)
Dept: PHYSICAL THERAPY | Facility: CLINIC | Age: 83
End: 2023-05-09
Payer: MEDICARE

## 2023-05-09 DIAGNOSIS — R26.9 GAIT DIFFICULTY: ICD-10-CM

## 2023-05-09 DIAGNOSIS — M21.371 FOOT DROP, BILATERAL: Primary | ICD-10-CM

## 2023-05-09 DIAGNOSIS — M21.372 FOOT DROP, BILATERAL: Primary | ICD-10-CM

## 2023-05-09 DIAGNOSIS — R26.89 IMBALANCE: ICD-10-CM

## 2023-05-09 PROCEDURE — 97112 NEUROMUSCULAR REEDUCATION: CPT | Performed by: PHYSICAL THERAPIST

## 2023-05-09 PROCEDURE — 97140 MANUAL THERAPY 1/> REGIONS: CPT | Performed by: PHYSICAL THERAPIST

## 2023-05-09 PROCEDURE — 97110 THERAPEUTIC EXERCISES: CPT | Performed by: PHYSICAL THERAPIST

## 2023-05-09 NOTE — PROGRESS NOTES
Physical Therapy Daily Treatment Note  Visit # 6        Patient: Eric Dukes   : 1940  Referring practitioner: Verena Plata ([place]), *  Date of Initial Evaluation:  Type: THERAPY  Noted: 2023  Today's Date: 2023           ICD-10-CM ICD-9-CM   1. Foot drop, bilateral  M21.371 736.79    M21.372    2. Imbalance  R26.89 781.2   3. Gait difficulty  R26.9 781.2       Subjective  Eric Dukes reports:   Doing well today, legs are feeling a little tired right now.     Objective   See Exercise, Manual, and Modality Logs for complete treatment.   Added leg press, toe press to TE program, added low intensity IASTM trial to (L) LE for nerve/tissue stimulation.    Assessment & Plan     Assessment    Assessment details: Tolerated continued progression of therapeutic exercise/therapeutic activity well today with additions as noted.  Pt notes tingling sensation in (L)LE after IASTM application.        Would continue to benefit from skilled PT progressing with functional WB and strength.  Goals reviewed today, has met 1/3 STG thus far, progressing toward remaining STG.       Goals  Plan Goals: STGs to be met by 2 weeks  1.) Pt will be independent and compliant with initial home exercise program - MET   2.) Pt will increase DF AROM in supine to lacking less than 10 deg from neutral B indicating improved strength and flexibility.  3.) Pt will report no falls - PROGRESSING (no falls over the past 7 days (since ))    LTGs to be met by 4 weeks  1.) Pt will be independent and compliant with advanced home exercise program.   2.) Pt will score >/= 46/80 on LEFS indicating decreased perceived functional disability.   3.)Pt will increase DF AROM in supine to neutral B indicating improved strength and flexibility.  4.) Pt will report no falls.           Progress per Plan of Care and Progress strengthening /stabilization /functional activity, assess 24 hour response to IASTM trial, advance HEP next visit, progress  balance activity as elizabeth.       Timed:         Manual Therapy:     10    mins  60194     Therapeutic Exercise:     20    mins  41723     Neuromuscular Herb:    10    mins  57783    Therapeutic Activity:      5    mins  17577     Gait Training:           mins  84548     Ultrasound:          mins  29602    Ionto                                   mins  79463  Self Care                            mins  24712    Un-Timed:  Electrical Stimulation:         mins 69162 ( )  Traction          mins 61124    Timed Treatment:   45   mins   Total Treatment:     45   mins    ALPHONSO Matias License #[id number]  Physical Therapist Assistant

## 2023-05-11 DIAGNOSIS — K21.9 GASTROESOPHAGEAL REFLUX DISEASE WITHOUT ESOPHAGITIS: ICD-10-CM

## 2023-05-12 ENCOUNTER — TREATMENT (OUTPATIENT)
Dept: PHYSICAL THERAPY | Facility: CLINIC | Age: 83
End: 2023-05-12
Payer: MEDICARE

## 2023-05-12 DIAGNOSIS — M21.371 FOOT DROP, BILATERAL: Primary | ICD-10-CM

## 2023-05-12 DIAGNOSIS — R26.9 GAIT DIFFICULTY: ICD-10-CM

## 2023-05-12 DIAGNOSIS — M21.372 FOOT DROP, BILATERAL: Primary | ICD-10-CM

## 2023-05-12 DIAGNOSIS — R26.89 IMBALANCE: ICD-10-CM

## 2023-05-12 PROCEDURE — 97110 THERAPEUTIC EXERCISES: CPT | Performed by: PHYSICAL THERAPIST

## 2023-05-12 PROCEDURE — 97140 MANUAL THERAPY 1/> REGIONS: CPT | Performed by: PHYSICAL THERAPIST

## 2023-05-12 PROCEDURE — 97112 NEUROMUSCULAR REEDUCATION: CPT | Performed by: PHYSICAL THERAPIST

## 2023-05-12 RX ORDER — OMEPRAZOLE 20 MG/1
CAPSULE, DELAYED RELEASE ORAL
Qty: 90 CAPSULE | Refills: 1 | OUTPATIENT
Start: 2023-05-12

## 2023-05-12 NOTE — PROGRESS NOTES
Physical Therapy Daily Treatment Note  Visit # 7        Patient: Eric Dukes   : 1940  Referring practitioner: Verena Plata ([place]), *  Date of Initial Evaluation:  Type: THERAPY  Noted: 2023  Today's Date: 2023           ICD-10-CM ICD-9-CM   1. Foot drop, bilateral  M21.371 736.79    M21.372    2. Imbalance  R26.89 781.2   3. Gait difficulty  R26.9 781.2       Subjective  Eric Dukes reports:  Some aching in legs and feet noted at onset of today's session.     Objective   See Exercise, Manual, and Modality Logs for complete treatment.   Continued with low intensity IASTM to (B) LE for nerve/tissue stimulation.    Assessment & Plan     Assessment    Assessment details: Tolerated continued progression of therapeutic exercise/therapeutic activity well today with additions as noted.  Pt continues to note tingling sensation in (L)LE after IASTM application.        Would continue to benefit from skilled PT progressing with functional WB and strength.      Goals  Plan Goals: STGs to be met by 2 weeks  1.) Pt will be independent and compliant with initial home exercise program - MET   2.) Pt will increase DF AROM in supine to lacking less than 10 deg from neutral B indicating improved strength and flexibility.  3.) Pt will report no falls - PROGRESSING (no falls over the past 7 days (since ))    LTGs to be met by 4 weeks  1.) Pt will be independent and compliant with advanced home exercise program.   2.) Pt will score >/= 46/80 on LEFS indicating decreased perceived functional disability.   3.)Pt will increase DF AROM in supine to neutral B indicating improved strength and flexibility.  4.) Pt will report no falls.           Progress per Plan of Care and Progress strengthening /stabilization /functional activity, assess 24 hour response to IASTM trial, advance HEP next visit, progress balance activity as elizabeth.       Timed:         Manual Therapy:     15    mins  70844     Therapeutic  Exercise:     20    mins  10212     Neuromuscular Herb:    10    mins  12587    Therapeutic Activity:      5    mins  92173     Gait Training:           mins  28695     Ultrasound:          mins  95990    Ionto                                   mins  43993  Self Care                            mins  64222    Un-Timed:  Electrical Stimulation:         mins 45501 ( )  Traction          mins 57290    Timed Treatment:   50   mins   Total Treatment:     50   mins    ALPHONSO Matias License #[id number]  Physical Therapist Assistant

## 2023-05-16 ENCOUNTER — TREATMENT (OUTPATIENT)
Dept: PHYSICAL THERAPY | Facility: CLINIC | Age: 83
End: 2023-05-16
Payer: MEDICARE

## 2023-05-16 DIAGNOSIS — M21.371 FOOT DROP, BILATERAL: Primary | ICD-10-CM

## 2023-05-16 DIAGNOSIS — R26.9 GAIT DIFFICULTY: ICD-10-CM

## 2023-05-16 DIAGNOSIS — M21.372 FOOT DROP, BILATERAL: Primary | ICD-10-CM

## 2023-05-16 DIAGNOSIS — R26.89 IMBALANCE: ICD-10-CM

## 2023-05-16 PROCEDURE — 97140 MANUAL THERAPY 1/> REGIONS: CPT | Performed by: PHYSICAL THERAPIST

## 2023-05-16 PROCEDURE — 97110 THERAPEUTIC EXERCISES: CPT | Performed by: PHYSICAL THERAPIST

## 2023-05-16 NOTE — PROGRESS NOTES
Physical Therapy Daily Treatment Note  Visit # 8        Patient: Eric Dukes   : 1940  Referring practitioner: Verena Plata ([place]), *  Date of Initial Evaluation:  Type: THERAPY  Noted: 2023  Today's Date: 2023           ICD-10-CM ICD-9-CM   1. Foot drop, bilateral  M21.371 736.79    M21.372    2. Imbalance  R26.89 781.2   3. Gait difficulty  R26.9 781.2       Subjective  Eric Dukes reports:   Feeling good today, nbo c/o with LE's or feet over the past few days.     Objective   See Exercise, Manual, and Modality Logs for complete treatment.     Assessment/Plan  Tolerated continued manual therapy and progression of therapeutic exercise well today, continued IASTM to (B) LE today with good tolerance, pt noting some sensation changes/stimulation during.      Progress per Plan of Care and Progress strengthening /stabilization /functional activity           Timed:         Manual Therapy:     15    mins  50460     Therapeutic Exercise:     35    mins  76387     Neuromuscular Herb:        mins  36597    Therapeutic Activity:          mins  35343     Gait Training:           mins  63720     Ultrasound:          mins  51088    Ionto                                   mins  55357  Self Care                            mins  47655    Un-Timed:  Electrical Stimulation:         mins 97111 ( )  Traction          mins 18878    Timed Treatment:   50   mins   Total Treatment:     50   mins    ALPHONSO Matias License #[id number]  Physical Therapist Assistant

## 2023-05-19 ENCOUNTER — TELEPHONE (OUTPATIENT)
Dept: GASTROENTEROLOGY | Facility: CLINIC | Age: 83
End: 2023-05-19

## 2023-05-19 ENCOUNTER — TREATMENT (OUTPATIENT)
Dept: PHYSICAL THERAPY | Facility: CLINIC | Age: 83
End: 2023-05-19
Payer: MEDICARE

## 2023-05-19 DIAGNOSIS — M21.371 FOOT DROP, BILATERAL: Primary | ICD-10-CM

## 2023-05-19 DIAGNOSIS — R26.9 GAIT DIFFICULTY: ICD-10-CM

## 2023-05-19 DIAGNOSIS — M21.372 FOOT DROP, BILATERAL: Primary | ICD-10-CM

## 2023-05-19 DIAGNOSIS — R26.89 IMBALANCE: ICD-10-CM

## 2023-05-19 PROCEDURE — 97110 THERAPEUTIC EXERCISES: CPT | Performed by: PHYSICAL THERAPIST

## 2023-05-19 PROCEDURE — 97112 NEUROMUSCULAR REEDUCATION: CPT | Performed by: PHYSICAL THERAPIST

## 2023-05-19 PROCEDURE — 97140 MANUAL THERAPY 1/> REGIONS: CPT | Performed by: PHYSICAL THERAPIST

## 2023-05-19 NOTE — PROGRESS NOTES
Physical Therapy Daily Treatment Note  Visit # 9        Patient: Eric Dukes   : 1940  Referring practitioner: Verena Plata ([place]), *  Date of Initial Evaluation:  Type: THERAPY  Noted: 2023  Today's Date: 2023           ICD-10-CM ICD-9-CM   1. Foot drop, bilateral  M21.371 736.79    M21.372    2. Imbalance  R26.89 781.2   3. Gait difficulty  R26.9 781.2       Subjective  Eric Dukes reports:   Feeling good today, no specific c/o at onset of today's visit.  Feels he has made some progress since beginning PT.     Objective   See Exercise, Manual, and Modality Logs for complete treatment.     L foot ROM:  Resting position = 40 PF.  AROM 20 deg DF (to -20 from 0), PROM 40 deg DF (to zero degrees)  L foot ROM:  Resting position = 42 PF.  AROM 20 deg DF (to -22 from 0), PROM 37 deg DF (to -5 from 0)    Assessment & Plan       Goals  Plan Goals: STGs to be met by 2 weeks  1.) Pt will be independent and compliant with initial home exercise program.   2.) Pt will increase DF AROM in supine to lacking less than 10 deg from neutral B indicating improved strength and flexibility.  3.) Pt will report no falls.     LTGs to be met by 4 weeks  1.) Pt will be independent and compliant with advanced home exercise program.   2.) Pt will score >/= 46/80 on LEFS indicating decreased perceived functional disability.   3.)Pt will increase DF AROM in supine to neutral B indicating improved strength and flexibility.  4.) Pt will report no falls.       Tolerated continued manual therapy and progression of therapeutic exercise well today, continued IASTM to (B) LE today with good tolerance, pt noting some sensation changes/stimulation during.      Progress per Plan of Care and Progress strengthening /stabilization /functional activity           Timed:         Manual Therapy:     15    mins  13625     Therapeutic Exercise:     15    mins  50637     Neuromuscular Herb:    20    mins  94084    Therapeutic Activity:           mins  87210     Gait Training:           mins  27186     Ultrasound:          mins  20071    Ionto                                   mins  91660  Self Care                            mins  24795    Un-Timed:  Electrical Stimulation:         mins 87288 ( )  Traction          mins 76342    Timed Treatment:   50   mins   Total Treatment:     50   mins    ALPHONSO Matias License #[id number]  Physical Therapist Assistant

## 2023-05-19 NOTE — TELEPHONE ENCOUNTER
Call to patient per Dr. Hyman's results and recommendations.   Patient verbalized understanding    HM and CS recall have been completed for 03/20/2028    Patient did express concern about needing another in five years because of his age. He was agreeable to the follow up colonoscopy

## 2023-05-19 NOTE — TELEPHONE ENCOUNTER
----- Message from Nba Hyman MD sent at 4/2/2023  6:14 PM EDT -----  Benign poly, repeat colon 5 years

## 2023-05-22 ENCOUNTER — TREATMENT (OUTPATIENT)
Dept: PHYSICAL THERAPY | Facility: CLINIC | Age: 83
End: 2023-05-22
Payer: MEDICARE

## 2023-05-22 DIAGNOSIS — R26.9 GAIT DIFFICULTY: ICD-10-CM

## 2023-05-22 DIAGNOSIS — R26.89 IMBALANCE: ICD-10-CM

## 2023-05-22 DIAGNOSIS — M21.371 FOOT DROP, BILATERAL: Primary | ICD-10-CM

## 2023-05-22 DIAGNOSIS — M21.372 FOOT DROP, BILATERAL: Primary | ICD-10-CM

## 2023-05-22 NOTE — PROGRESS NOTES
Physical Therapy Daily Treatment Note  Visit # 10        Patient: Eric Dukes   : 1940  Referring practitioner: Verena Plata ([place]), *  Date of Initial Evaluation:  Type: THERAPY  Noted: 2023  Today's Date: 2023           ICD-10-CM ICD-9-CM   1. Foot drop, bilateral  M21.371 736.79    M21.372    2. Imbalance  R26.89 781.2   3. Gait difficulty  R26.9 781.2       Subjective  Eric Dukes reports:   Feeling good today, no specific c/o at onset of today's visit.  Feels he has made some progress since beginning PT.     Objective   See Exercise, Manual, and Modality Logs for complete treatment.     L foot ROM:  Resting position = 40 PF.  AROM 20 deg DF (to -20 from 0), PROM 40 deg DF (to zero degrees)  L foot ROM:  Resting position = 42 PF.  AROM 20 deg DF (to -22 from 0), PROM 37 deg DF (to -5 from 0)    Assessment & Plan     Assessment    Assessment details: Continued PT treatment with increased MT/STM focus today.  Pt with good tolerance throughout, stiffness of metatarsal joints (B) noted.  We discussed manual self-stretching and ranging of toes where he is unable to actively perform ROM.      Goals  Plan Goals: STGs to be met by 2 weeks  1.) Pt will be independent and compliant with initial home exercise program.   2.) Pt will increase DF AROM in supine to lacking less than 10 deg from neutral B indicating improved strength and flexibility.  3.) Pt will report no falls.     LTGs to be met by 4 weeks  1.) Pt will be independent and compliant with advanced home exercise program.   2.) Pt will score >/= 46/80 on LEFS indicating decreased perceived functional disability.   3.)Pt will increase DF AROM in supine to neutral B indicating improved strength and flexibility.  4.) Pt will report no falls.           Progress per Plan of Care and Progress strengthening /stabilization /functional activity           Timed:         Manual Therapy:     25    mins  43560     Therapeutic Exercise:          mins  47047     Neuromuscular Herb:    15    mins  19779    Therapeutic Activity:          mins  24594     Gait Training:           mins  46403     Ultrasound:          mins  72474    Ionto                                   mins  98143  Self Care                            mins  25906    Un-Timed:  Electrical Stimulation:         mins 00116 ( )  Traction          mins 62431    Timed Treatment:   40   mins   Total Treatment:     40   mins    ALPHONSO Matias License #[id number]  Physical Therapist Assistant

## 2023-05-24 ENCOUNTER — TREATMENT (OUTPATIENT)
Dept: PHYSICAL THERAPY | Facility: CLINIC | Age: 83
End: 2023-05-24
Payer: MEDICARE

## 2023-05-24 DIAGNOSIS — M21.372 FOOT DROP, BILATERAL: Primary | ICD-10-CM

## 2023-05-24 DIAGNOSIS — M21.371 FOOT DROP, BILATERAL: Primary | ICD-10-CM

## 2023-05-24 DIAGNOSIS — R26.9 GAIT DIFFICULTY: ICD-10-CM

## 2023-05-24 DIAGNOSIS — R26.89 IMBALANCE: ICD-10-CM

## 2023-05-24 NOTE — LETTER
Physical Therapy Progress Note    Patient: Eric Dukes  : 1940  Referring practitioner: Verena Plata APRN ([place])  Today's Date: 2023    VISIT#: 11    LEFS:       Subjective   Eric Dukes reports: some intermittent compliance with HEP, not as often as he should be. He thinks his walking has gotten better, reports about 50% improvements in symptoms since start of POC. Denies falling in the past month, however reports frequent losses of balance.  Pt reports wearing shoes almost entire day.        Objective      Neurological Testing     Sensation     Ankle/Foot   Left Ankle/Foot   Intact: proprioception  Diminished: light touch  Paresthesia: light touch    Right Ankle/Foot   Diminished: light touch and proprioception  Paresthesia: light touch    Active Range of Motion   Left Ankle/Foot   Inversion: 18 degrees   Eversion: 18 degrees     Right Ankle/Foot   Inversion: 19 degrees   Eversion: 15 degrees     Additional Active Range of Motion Details  Dorsiflexion  L 0 degrees  R lacking 4 degrees from neutral    Passive Range of Motion   Left Ankle/Foot    Dorsiflexion (ke): 5 degrees   Inversion: 24 degrees   Eversion: 20 degrees     Right Ankle/Foot    Dorsiflexion (ke): 7 degrees   Inversion: 32 degrees   Eversion: 18 degrees     Strength/Myotome Testing     Left Ankle/Foot   Dorsiflexion: 3  Plantar flexion: 4- (tested in sitting)  Inversion: 4+  Eversion: 3+    Right Ankle/Foot   Dorsiflexion: 3-  Plantar flexion: 3+ (tested in sitting)  Inversion: 3+  Eversion: 3+        Assessment & Plan     Assessment    Assessment details: Pt continues to present to PT with complaints of balance and gait disturbances secondary to idiopathic drop foot bilaterally. He has demonstrated some improvements in LE/ankle strength and ankle ROM, however continues to exhibit similar findings in relation to ankle instability during wegihtbearing activities.  His self reported function and symptoms have improved by  ~50% however LEFS score remains relatively unchanged.  He has met 2/3 STGs and is progressing well toward all other goals.  He will continue to benefit from skilled PT services to address his ongoing deficits and facilitate a return to safe function with decreased risk of falls.    Goals  Plan Goals: STGs to be met by 2 weeks  1.) Pt will be independent and compliant with initial home exercise program. NOT MET  2.) Pt will increase DF AROM in supine to lacking less than 10 deg from neutral B indicating improved strength and flexibility. MET  3.) Pt will report no falls. MET    LTGs to be met by 4 weeks  1.) Pt will be independent and compliant with advanced home exercise program.   2.) Pt will score >/= 46/80 on LEFS indicating decreased perceived functional disability.   3.)Pt will increase DF AROM in supine to neutral B indicating improved strength and flexibility.  4.) Pt will report no falls.           Progress per Plan of Care      Yair Castañeda, PT  Physical Therapist  [facility] license #: [id number]

## 2023-05-24 NOTE — PROGRESS NOTES
Physical Therapy Daily Treatment and Progress Note    Patient: Eric Dukes  : 1940  Referring practitioner: Verena Plata APRN ([facility])  Today's Date: 2023    VISIT#: 11    LEFS:       Subjective   Eric Dukes reports: some intermittent compliance with HEP, not as often as he should be. He thinks his walking has gotten better, reports about 50% improvements in symptoms since start of POC. Denies falling in the past month, however reports frequent losses of balance.  Pt reports wearing shoes almost entire day.        Objective          Neurological Testing     Sensation     Ankle/Foot   Left Ankle/Foot   Intact: proprioception  Diminished: light touch  Paresthesia: light touch    Right Ankle/Foot   Diminished: light touch and proprioception  Paresthesia: light touch    Active Range of Motion   Left Ankle/Foot   Inversion: 18 degrees   Eversion: 18 degrees     Right Ankle/Foot   Inversion: 19 degrees   Eversion: 15 degrees     Additional Active Range of Motion Details  Dorsiflexion  L 0 degrees  R lacking 4 degrees from neutral    Passive Range of Motion   Left Ankle/Foot    Dorsiflexion (ke): 5 degrees   Inversion: 24 degrees   Eversion: 20 degrees     Right Ankle/Foot    Dorsiflexion (ke): 7 degrees   Inversion: 32 degrees   Eversion: 18 degrees     Strength/Myotome Testing     Left Ankle/Foot   Dorsiflexion: 3  Plantar flexion: 4- (tested in sitting)  Inversion: 4+  Eversion: 3+    Right Ankle/Foot   Dorsiflexion: 3-  Plantar flexion: 3+ (tested in sitting)  Inversion: 3+  Eversion: 3+        See Exercise, Manual, and Modality Logs for complete treatment.     Patient Education: AFO and safety; assessment findings and progress; focus on strengthening and balance      Assessment & Plan     Assessment    Assessment details: Pt continues to present to PT with complaints of balance and gait disturbances secondary to idiopathic drop foot bilaterally. He has demonstrated some improvements in  LE/ankle strength and ankle ROM, however continues to exhibit similar findings in relation to ankle instability during wegihtbearing activities.  His self reported function and symptoms have improved by ~50% however LEFS score remains relatively unchanged.  He has met 2/3 STGs and is progressing well toward all other goals.  He will continue to benefit from skilled PT services to address his ongoing deficits and facilitate a return to safe function with decreased risk of falls.    Goals  Plan Goals: STGs to be met by 2 weeks  1.) Pt will be independent and compliant with initial home exercise program. NOT MET  2.) Pt will increase DF AROM in supine to lacking less than 10 deg from neutral B indicating improved strength and flexibility. MET  3.) Pt will report no falls. MET    LTGs to be met by 4 weeks  1.) Pt will be independent and compliant with advanced home exercise program.   2.) Pt will score >/= 46/80 on LEFS indicating decreased perceived functional disability.   3.)Pt will increase DF AROM in supine to neutral B indicating improved strength and flexibility.  4.) Pt will report no falls.               Progress per Plan of Care          Timed:         Manual Therapy:         mins  34629;     Therapeutic Exercise:    8     mins  08575;     Neuromuscular Herb:    5    mins  48542;    Therapeutic Activity:     10     mins  72234;     Gait Training:           mins  88299;     Ultrasound:          mins  57342;    Ionto:                                   mins  25934  Self Care:                       15     mins  46759    Un-Timed:  Electrical Stimulation:         mins  56204 ( );  Dry Needling          mins self-pay  Traction          mins 17202  Re-Eval                               mins  42871  Group Therapy           ___ mins 19661    Timed Treatment:   38   mins   Total Treatment:     38   mins    Yair Castañeda PT  Physical Therapist  [place] [facility] license #: [id number]

## 2023-06-01 ENCOUNTER — TREATMENT (OUTPATIENT)
Dept: PHYSICAL THERAPY | Facility: CLINIC | Age: 83
End: 2023-06-01

## 2023-06-01 DIAGNOSIS — R26.89 IMBALANCE: ICD-10-CM

## 2023-06-01 DIAGNOSIS — M21.372 FOOT DROP, BILATERAL: Primary | ICD-10-CM

## 2023-06-01 DIAGNOSIS — R26.9 GAIT DIFFICULTY: ICD-10-CM

## 2023-06-01 DIAGNOSIS — M21.371 FOOT DROP, BILATERAL: Primary | ICD-10-CM

## 2023-06-01 NOTE — PROGRESS NOTES
Physical Therapy Daily Treatment Note  Visit # 12        Patient: Eric Dukes   : 1940  Referring practitioner: Verena Plata ([place]), *  Date of Initial Evaluation:  Type: THERAPY  Noted: 2023  Today's Date: 2023           ICD-10-CM ICD-9-CM   1. Foot drop, bilateral  M21.371 736.79    M21.372    2. Imbalance  R26.89 781.2   3. Gait difficulty  R26.9 781.2       Subjective  Eric Dukes reports:   Has gotten AFO device, wearing on (R) foot today.  Not sure if it's effective.  No additional c/o at onset of today's session.     Objective   See Exercise, Manual, and Modality Logs for complete treatment.     Assessment & Plan     Assessment    Assessment details: Tolerated continued manual therapy and progression of therapeutic exercise/balance activity well today, no increased pain reported during or after exercises.  Reviewed balance exercise for HEP performance.  Unsure of effectiveness of AFO device on (R) foot, has only been wearing a few days and will continue to make adjustments.       Goals  Plan Goals: STGs to be met by 2 weeks  1.) Pt will be independent and compliant with initial home exercise program.   2.) Pt will increase DF AROM in supine to lacking less than 10 deg from neutral B indicating improved strength and flexibility.  3.) Pt will report no falls.     LTGs to be met by 4 weeks  1.) Pt will be independent and compliant with advanced home exercise program.   2.) Pt will score >/= 46/80 on LEFS indicating decreased perceived functional disability.   3.)Pt will increase DF AROM in supine to neutral B indicating improved strength and flexibility.  4.) Pt will report no falls.           Progress per Plan of Care and Progress strengthening /stabilization /functional activity           Timed:         Manual Therapy:     10    mins  06832     Therapeutic Exercise:      15   mins  86297     Neuromuscular Herb:    15    mins  77775    Therapeutic Activity:          mins  36618      Gait Training:           mins  29262     Ultrasound:          mins  31129    Ionto                                   mins  18867  Self Care                            mins  24679    Un-Timed:  Electrical Stimulation:         mins 09729 ( )  Traction          mins 54747    Timed Treatment:   40   mins   Total Treatment:     40   mins    ALPHONSO Matias License #[id number]  Physical Therapist Assistant

## 2023-06-02 ENCOUNTER — TREATMENT (OUTPATIENT)
Dept: PHYSICAL THERAPY | Facility: CLINIC | Age: 83
End: 2023-06-02

## 2023-06-02 DIAGNOSIS — M21.371 FOOT DROP, BILATERAL: Primary | ICD-10-CM

## 2023-06-02 DIAGNOSIS — M21.372 FOOT DROP, BILATERAL: Primary | ICD-10-CM

## 2023-06-02 DIAGNOSIS — R26.89 IMBALANCE: ICD-10-CM

## 2023-06-02 DIAGNOSIS — R26.9 GAIT DIFFICULTY: ICD-10-CM

## 2023-06-02 NOTE — PROGRESS NOTES
Physical Therapy Daily Treatment Note    Patient: Eric Dukes  : 1940  Referring practitioner: Verena Plata APRN ([facility])  Today's Date: 2023    VISIT#: 13      Subjective   Eric Dukes reports: thinks his velcro-cuff AFO is a little helpful.       Objective     See Exercise, Manual, and Modality Logs for complete treatment.     Patient Education: focus on repeated ankle AROM throughout day, updated HEP      Assessment/Plan  Pt demonstrates continued difficulty with weightbearing tasks for ankle ROM and mobility, therefore exercises were regressed to focus on primarily non-weightbearing AROM type tasks. He was able to perform these AROM tasks with cuing with better motion than with resisted exercises. Regressed and updated HEP.      Progress per Plan of Care           Timed:         Manual Therapy:         mins  58560;     Therapeutic Exercise:    15     mins  58816;     Neuromuscular Herb:    20    mins  39345;    Therapeutic Activity:          mins  67977;     Gait Training:           mins  15272;     Ultrasound:          mins  69766;    Ionto:                                   mins  82968  Self Care:                       5     mins  49464    Un-Timed:  Electrical Stimulation:         mins  42631 ( );  Dry Needling          mins self-pay  Traction          mins 31097  Re-Eval                               mins  01697  Group Therapy           ___ mins 01893    Timed Treatment:   40   mins   Total Treatment:     40   mins    Yair Castañeda PT  Physical Therapist  [place] license #: [id number]

## 2023-06-05 ENCOUNTER — TREATMENT (OUTPATIENT)
Dept: PHYSICAL THERAPY | Facility: CLINIC | Age: 83
End: 2023-06-05
Payer: MEDICARE

## 2023-06-05 DIAGNOSIS — M21.371 FOOT DROP, BILATERAL: Primary | ICD-10-CM

## 2023-06-05 DIAGNOSIS — R26.89 IMBALANCE: ICD-10-CM

## 2023-06-05 DIAGNOSIS — R26.9 GAIT DIFFICULTY: ICD-10-CM

## 2023-06-05 DIAGNOSIS — M21.372 FOOT DROP, BILATERAL: Primary | ICD-10-CM

## 2023-06-05 NOTE — PROGRESS NOTES
Physical Therapy Daily Treatment Note    [facility] [place]  [address], [address]  [place] [id number]    Visit # 14        Patient: Eric Dukes   : 1940  Referring practitioner: Verena Plata ([place]), *  Date of Initial Evaluation:  Type: THERAPY  Noted: 2023  Today's Date: 2023           ICD-10-CM ICD-9-CM   1. Foot drop, bilateral  M21.371 736.79    M21.372    2. Imbalance  R26.89 781.2   3. Gait difficulty  R26.9 781.2       Subjective  Eric Dukes reports:   Feels the AFO has helped on his (R) foot and is planning on getting one ofr his (L) also.  Has written HEP of most of the seated exercises but ahs not been doing them regularly.     Objective   See Exercise, Manual, and Modality Logs for complete treatment.   Reviewed current HEP, progressed therex program with exercises as noted.    Assessment/Plan  Tolerated continued progression of therapeutic exercise/therapeutic activity well today, no increased pain reported during or after exercises.  Reviewed current HEP focused mostly on seated /reduced WB exercise and standing balance activity.  Pt demos improved performance with balance activity with repetition in clinic.   Would continue to benefit from skilled PT progressing with functional WB and strength.        Progress per Plan of Care and Progress strengthening /stabilization /functional activity           Timed:         Manual Therapy:         mins  18402     Therapeutic Exercise:         mins  28415     Neuromuscular Herb:    25    mins  77355    Therapeutic Activity:      15    mins  09852     Gait Training:           mins  49603     Ultrasound:          mins  91340    Ionto                                   mins  72313  Self Care                            mins  71842    Un-Timed:  Electrical Stimulation:         mins 03632 ( )  Traction          mins 56035    Timed Treatment:   40   mins   Total Treatment:     40    paola Vogel, PTA  [place] License #[id number]  Physical Therapist Assistant

## 2023-06-12 ENCOUNTER — TREATMENT (OUTPATIENT)
Dept: PHYSICAL THERAPY | Facility: CLINIC | Age: 83
End: 2023-06-12
Payer: MEDICARE

## 2023-06-12 DIAGNOSIS — R26.9 GAIT DIFFICULTY: ICD-10-CM

## 2023-06-12 DIAGNOSIS — R26.89 IMBALANCE: ICD-10-CM

## 2023-06-12 DIAGNOSIS — M21.371 FOOT DROP, BILATERAL: Primary | ICD-10-CM

## 2023-06-12 DIAGNOSIS — M21.372 FOOT DROP, BILATERAL: Primary | ICD-10-CM

## 2023-06-12 NOTE — PROGRESS NOTES
Physical Therapy Daily Treatment Note    [facility] [place]  [address], [address]  [place] [id number]    Visit # 15        Patient: Eric Dukes   : 1940  Referring practitioner: Verena Plata ([place]), *  Date of Initial Evaluation:  Type: THERAPY  Noted: 2023  Today's Date: 2023           ICD-10-CM ICD-9-CM   1. Foot drop, bilateral  M21.371 736.79    M21.372    2. Gait difficulty  R26.9 781.2   3. Imbalance  R26.89 781.2       Subjective  Eric Dukes reports:   Has gotten AFO for (L) foot, feels they are helpful.  Reports feeling good with HEP to this point, and progressing with HEP on his own.      Objective   See Exercise, Manual, and Modality Logs for complete treatment.   Reviewed current HEP, progressed therex program with exercises as noted.    Assessment & Plan     Assessment    Assessment details: Tolerated continued progression of therapeutic exercise/therapeutic activity well today, no increased pain reported during or after exercises.  Reviewed all current HEP and progressions from this point.      Goals  Plan Goals: STGs to be met by 2 weeks  1.) Pt will be independent and compliant with initial home exercise program - MET  2.) Pt will increase DF AROM in supine to lacking less than 10 deg from neutral B indicating improved strength and flexibility - MET  3.) Pt will report no falls - MET    LTGs to be met by 4 weeks  1.) Pt will be independent and compliant with advanced home exercise program.   2.) Pt will score >/= 46/80 on LEFS indicating decreased perceived functional disability.   3.)Pt will increase DF AROM in supine to neutral B indicating improved strength and flexibility.  4.) Pt will report no falls.      Plan  Plan details: DC from PT at this time with continued HEP.                        Timed:         Manual Therapy:         mins  84835     Therapeutic Exercise:     18    mins  74623     Neuromuscular Herb:   15     mins  78653    Therapeutic Activity:      15    mins  34163   g  Gait Training:           mins  93977     Ultrasound:          mins  40300    Ionto                                   mins  75555  Self Care                            mins  53374    Un-Timed:  Electrical Stimulation:         mins 14843 ( )  Traction          mins 76791    Timed Treatment:   43   mins   Total Treatment:     43   mins    ALPHONSO Matias License #[id number]  Physical Therapist Assistant

## 2023-08-28 DIAGNOSIS — K21.9 GASTROESOPHAGEAL REFLUX DISEASE WITHOUT ESOPHAGITIS: ICD-10-CM

## 2023-08-28 RX ORDER — OMEPRAZOLE 20 MG/1
CAPSULE, DELAYED RELEASE ORAL
Qty: 90 CAPSULE | Refills: 1 | OUTPATIENT
Start: 2023-08-28

## 2023-10-16 ENCOUNTER — OFFICE VISIT (OUTPATIENT)
Dept: FAMILY MEDICINE CLINIC | Facility: CLINIC | Age: 83
End: 2023-10-16
Payer: MEDICARE

## 2023-10-16 VITALS
WEIGHT: 174.4 LBS | DIASTOLIC BLOOD PRESSURE: 52 MMHG | HEART RATE: 55 BPM | RESPIRATION RATE: 16 BRPM | SYSTOLIC BLOOD PRESSURE: 118 MMHG | BODY MASS INDEX: 27.37 KG/M2 | OXYGEN SATURATION: 98 % | HEIGHT: 67 IN

## 2023-10-16 DIAGNOSIS — E11.9 TYPE 2 DIABETES MELLITUS WITHOUT COMPLICATION, WITHOUT LONG-TERM CURRENT USE OF INSULIN: ICD-10-CM

## 2023-10-16 DIAGNOSIS — M10.9 GOUT, UNSPECIFIED CAUSE, UNSPECIFIED CHRONICITY, UNSPECIFIED SITE: ICD-10-CM

## 2023-10-16 DIAGNOSIS — I10 ESSENTIAL HYPERTENSION: ICD-10-CM

## 2023-10-16 DIAGNOSIS — K21.9 GASTROESOPHAGEAL REFLUX DISEASE WITHOUT ESOPHAGITIS: ICD-10-CM

## 2023-10-16 DIAGNOSIS — E78.2 MIXED HYPERLIPIDEMIA: Primary | ICD-10-CM

## 2023-10-16 RX ORDER — OMEPRAZOLE 20 MG/1
20 CAPSULE, DELAYED RELEASE ORAL DAILY
Qty: 90 CAPSULE | Refills: 1 | Status: SHIPPED | OUTPATIENT
Start: 2023-10-16

## 2023-10-16 RX ORDER — TERAZOSIN 1 MG/1
1 CAPSULE ORAL NIGHTLY
COMMUNITY

## 2023-10-16 NOTE — PROGRESS NOTES
"Subjective   Eric Dukes is a 83 y.o. male.     History of Present Illness    Since the last visit, he has overall felt well.  He has Primary Hypertension and well controlled on current medication, DMII well controlled on medication and will continue regimen, GERD controlled on PPI Rx, and Hyperlipidemia with goals met with current Rx.  he has been compliant with current medications have reviewed them.  The patient denies medication side effects.  Will refill medications. /52   Pulse 55   Resp 16   Ht 170.2 cm (67\")   Wt 79.1 kg (174 lb 6.4 oz)   SpO2 98%   BMI 27.31 kg/m²     Results for orders placed or performed during the hospital encounter of 03/20/23   POC Glucose Once    Specimen: Blood   Result Value Ref Range    Glucose 155 (H) 70 - 130 mg/dL   Tissue Pathology Exam    Specimen: Large Intestine, Transverse Colon; Polyp   Result Value Ref Range    Case Report       Surgical Pathology Report                         Case: [id number]                                  Authorizing Provider:  Nba Hyman MD    Collected:           03/20/2023 01:29 PM          Ordering Location:     [facility]  Received:            03/20/2023 02:33 PM                                 ENDO SUITES                                                                  Pathologist:           Lui Morrison MD                                                         Specimen:    Large Intestine, Transverse Colon, polyps x3                                               Final Diagnosis       1. Transverse Colon Polyps x3 Biopsy:   A. Fragments of tubular adenoma and focally eroded hyperplastic polyp with inflamed granulation tissue.  B. No high-grade dysplasia nor malignancy identified.     radhat/patricia       Gross Description       1. Large Intestine, Transverse Colon.  The specimen is received in formalin labeled with the patient's name and further designated 'transverse colon polyps biopsy' are multiple " small fragments of gray-tan tissue. The specimen is submitted for embedding as received.             [facility] and will bring copy to be [facility] in. [facility] several [facility] but wants to have some other ones switched over here.      He is UTD with Dr. Cooper who prescribes allopurinol and states he has not had gout flare in a long time.   The following portions of the patient's history were reviewed and updated as appropriate: allergies, current medications, past family history, past medical history, past social history, past surgical history, and problem list.    Review of Systems   Constitutional:  Negative for fatigue.   Respiratory:  Negative for cough and shortness of breath.    Cardiovascular:  Negative for chest pain and palpitations.   Skin:  Negative for rash.   Psychiatric/Behavioral:  Negative for dysphoric mood and sleep disturbance. The patient is not nervous/anxious.        Objective   Physical Exam  Vitals and nursing note reviewed.   Constitutional:       Appearance: Normal appearance. He is well-developed.   Neck:      Vascular: No carotid bruit.   Cardiovascular:      Rate and Rhythm: Normal rate and regular rhythm.   Pulmonary:      Effort: Pulmonary effort is normal.      Breath sounds: Normal breath sounds.   Neurological:      Mental Status: He is alert and oriented to person, place, and time.   Psychiatric:         Mood and Affect: Mood normal.         Behavior: Behavior normal.         Thought Content: Thought content normal.         Judgment: Judgment normal.         Assessment & Plan   Diagnoses and all orders for this visit:    1. Mixed hyperlipidemia (Primary)    2. Type 2 diabetes mellitus without complication, without long-term current use of insulin  -     metFORMIN (GLUCOPHAGE) 500 MG tablet; Take 1 tablet by mouth 3 (Three) Times a Day.  Dispense: 270 tablet; Refill: 1    3. Gastroesophageal reflux disease without esophagitis  -      omeprazole (priLOSEC) 20 MG capsule; Take 1 capsule by mouth Daily.  Dispense: 90 capsule; Refill: 1    4. Essential hypertension    5. Gout, unspecified cause, unspecified chronicity, unspecified site

## 2024-03-10 DIAGNOSIS — K21.9 GASTROESOPHAGEAL REFLUX DISEASE WITHOUT ESOPHAGITIS: ICD-10-CM

## 2024-03-14 RX ORDER — OMEPRAZOLE 20 MG/1
20 CAPSULE, DELAYED RELEASE ORAL DAILY
Qty: 14 CAPSULE | Refills: 0 | Status: SHIPPED | OUTPATIENT
Start: 2024-03-14

## 2024-03-22 ENCOUNTER — OFFICE VISIT (OUTPATIENT)
Dept: FAMILY MEDICINE CLINIC | Facility: CLINIC | Age: 84
End: 2024-03-22
Payer: MEDICARE

## 2024-03-22 VITALS
BODY MASS INDEX: 27.94 KG/M2 | RESPIRATION RATE: 16 BRPM | SYSTOLIC BLOOD PRESSURE: 124 MMHG | OXYGEN SATURATION: 96 % | HEART RATE: 61 BPM | WEIGHT: 178 LBS | DIASTOLIC BLOOD PRESSURE: 60 MMHG | HEIGHT: 67 IN

## 2024-03-22 DIAGNOSIS — E11.9 TYPE 2 DIABETES MELLITUS WITHOUT COMPLICATION, WITHOUT LONG-TERM CURRENT USE OF INSULIN: ICD-10-CM

## 2024-03-22 DIAGNOSIS — K21.9 GASTROESOPHAGEAL REFLUX DISEASE WITHOUT ESOPHAGITIS: ICD-10-CM

## 2024-03-22 DIAGNOSIS — N40.1 BENIGN PROSTATIC HYPERPLASIA WITH WEAK URINARY STREAM: Primary | ICD-10-CM

## 2024-03-22 DIAGNOSIS — R39.12 BENIGN PROSTATIC HYPERPLASIA WITH WEAK URINARY STREAM: Primary | ICD-10-CM

## 2024-03-22 RX ORDER — TERAZOSIN 1 MG/1
1 CAPSULE ORAL NIGHTLY
Qty: 90 CAPSULE | Refills: 1 | Status: SHIPPED | OUTPATIENT
Start: 2024-03-22

## 2024-03-22 RX ORDER — OMEPRAZOLE 20 MG/1
20 CAPSULE, DELAYED RELEASE ORAL DAILY
Qty: 90 CAPSULE | Refills: 1 | Status: SHIPPED | OUTPATIENT
Start: 2024-03-22

## 2024-03-22 RX ORDER — TAMSULOSIN HYDROCHLORIDE 0.4 MG/1
1 CAPSULE ORAL DAILY
Qty: 90 CAPSULE | Refills: 1 | Status: SHIPPED | OUTPATIENT
Start: 2024-03-22

## 2024-03-22 NOTE — PROGRESS NOTES
"Subjective   Eric Dukes is a 83 y.o. male.     History of Present Illness   Diabetes (Pt needs form filled out for diabetic shoes and has two [facility] due to cost)     Since the last visit, he has overall felt fairly well.  He has Primary Hypertension and well controlled on current medication, DMII well controlled on medication and will continue regimen, Hyperlipidemia with goals met with current Rx, and BPH and would like to have tamsulosin and terazosin filled by [facility] instead of at [facility] as it is cheaper at local pharmacy .  he has been compliant with current medications have reviewed them.  The patient denies medication side effects.  Will refill medications. /60   Pulse 61   Resp 16   Ht 170.2 cm (67\")   Wt 80.7 kg (178 lb)   SpO2 96%   BMI 27.88 kg/m²     Results for orders placed or performed during the hospital encounter of 03/20/23   POC Glucose Once    Specimen: Blood   Result Value Ref Range    Glucose 155 (H) 70 - 130 mg/dL   Tissue Pathology Exam    Specimen: Large Intestine, Transverse Colon; Polyp   Result Value Ref Range    Case Report       Surgical Pathology Report                         Case: [id number]                                  Authorizing Provider:  Nba Hyman MD    Collected:           03/20/2023 01:29 PM          Ordering Location:     [facility]  Received:            03/20/2023 02:33 PM                                 ENDO SUITES                                                                  Pathologist:           Lui Morrison MD                                                         Specimen:    Large Intestine, Transverse Colon, polyps x3                                               Final Diagnosis       1. Transverse Colon Polyps x3 Biopsy:   A. Fragments of tubular adenoma and focally eroded hyperplastic polyp with inflamed granulation tissue.  B. No high-grade dysplasia nor malignancy identified. "     bill/patricia       Gross Description       1. Large Intestine, Transverse Colon.  The specimen is received in formalin labeled with the patient's name and further designated 'transverse colon polyps biopsy' are multiple small fragments of gray-tan tissue. The specimen is submitted for embedding as received.             [facility] which will be [facility] in chart.  LDL is below goal of 70 and A1c is below 6.5.  Normal renal function.   The following portions of the patient's history were reviewed and updated as appropriate: allergies, current medications, past family history, past medical history, past social history, past surgical history, and problem list.    Review of Systems   Constitutional:  Negative for fatigue.   Respiratory:  Negative for cough and shortness of breath.    Cardiovascular:  Negative for chest pain and palpitations.   Skin:  Negative for rash.   Psychiatric/Behavioral:  Negative for dysphoric mood and sleep disturbance. The patient is not nervous/anxious.        Objective   Physical Exam  Vitals and nursing note reviewed.   Constitutional:       Appearance: Normal appearance. He is well-developed.   Neck:      Vascular: No carotid bruit.   Cardiovascular:      Rate and Rhythm: Normal rate and regular rhythm.   Pulmonary:      Effort: Pulmonary effort is normal.      Breath sounds: Normal breath sounds.   Feet:      Right foot:      Protective Sensation: 8 sites tested.  8 sites sensed.      Skin integrity: Skin integrity normal.      Left foot:      Protective Sensation: 8 sites tested.  8 sites sensed.      Skin integrity: Skin integrity normal.   Neurological:      Mental Status: He is alert and oriented to person, place, and time.   Psychiatric:         Mood and Affect: Mood normal.         Behavior: Behavior normal.         Thought Content: Thought content normal.         Judgment: Judgment normal.         Assessment & Plan   Diagnoses and all orders for this visit:    1. Benign  prostatic hyperplasia with weak urinary stream (Primary)  -     terazosin (HYTRIN) 1 MG capsule; Take 1 capsule by mouth Every Night.  Dispense: 90 capsule; Refill: 1  -     tamsulosin (FLOMAX) 0.4 MG capsule 24 hr capsule; Take 1 capsule by mouth Daily.  Dispense: 90 capsule; Refill: 1    2. Type 2 diabetes mellitus without complication, without long-term current use of insulin  -     metFORMIN (GLUCOPHAGE) 500 MG tablet; Take 1 tablet by mouth 3 (Three) Times a Day.  Dispense: 270 tablet; Refill: 1    3. Gastroesophageal reflux disease without esophagitis  -     omeprazole (priLOSEC) 20 MG capsule; Take 1 capsule by mouth Daily.  Dispense: 90 capsule; Refill: 1        Labs reviewed with pt today during visit. All questions answered.

## 2024-04-15 ENCOUNTER — OFFICE VISIT (OUTPATIENT)
Dept: FAMILY MEDICINE CLINIC | Facility: CLINIC | Age: 84
End: 2024-04-15
Payer: MEDICARE

## 2024-04-15 VITALS
SYSTOLIC BLOOD PRESSURE: 124 MMHG | BODY MASS INDEX: 27.78 KG/M2 | RESPIRATION RATE: 16 BRPM | HEART RATE: 58 BPM | OXYGEN SATURATION: 94 % | HEIGHT: 67 IN | DIASTOLIC BLOOD PRESSURE: 50 MMHG | WEIGHT: 177 LBS

## 2024-04-15 DIAGNOSIS — Z00.00 MEDICARE ANNUAL WELLNESS VISIT, SUBSEQUENT: Primary | ICD-10-CM

## 2024-04-15 PROCEDURE — 1170F FXNL STATUS ASSESSED: CPT | Performed by: NURSE PRACTITIONER

## 2024-04-15 PROCEDURE — 3074F SYST BP LT 130 MM HG: CPT | Performed by: NURSE PRACTITIONER

## 2024-04-15 PROCEDURE — 3078F DIAST BP <80 MM HG: CPT | Performed by: NURSE PRACTITIONER

## 2024-04-15 PROCEDURE — 1159F MED LIST DOCD IN RCRD: CPT | Performed by: NURSE PRACTITIONER

## 2024-04-15 PROCEDURE — 1160F RVW MEDS BY RX/DR IN RCRD: CPT | Performed by: NURSE PRACTITIONER

## 2024-04-15 PROCEDURE — G0439 PPPS, SUBSEQ VISIT: HCPCS | Performed by: NURSE PRACTITIONER

## 2024-04-15 NOTE — PATIENT INSTRUCTIONS
Medicare Wellness  Personal Prevention Plan of Service     Date of Office Visit:    Encounter Provider:  SHAJI Linn  Place of Service:  [facility] PR[facility]  Patient Name: Eric Dukes  :  1940    As part of the Medicare Wellness portion of your visit today, we are providing you with this personalized preventive plan of services (PPPS). This plan is based upon recommendations of the [place] Preventive Services Task Force (USPSTF) and the Advisory Committee on Immunization Practices (ACIP).    This lists the preventive care services that should be considered, and provides dates of when you are due. Items listed as completed are up-to-date and do not require any further intervention.    Health Maintenance   Topic Date Due    ZOSTER VACCINE (2 of 2) 04/15/2024 (Originally 2009)    URINE MICROALBUMIN  2024    HEMOGLOBIN A1C  2024    INFLUENZA VACCINE  2024    DIABETIC EYE EXAM  10/12/2024    LIPID PANEL  2025    DIABETIC FOOT EXAM  2025    ANNUAL WELLNESS VISIT  04/15/2025    BMI FOLLOWUP  04/15/2025    COLORECTAL CANCER SCREENING  2028    TDAP/TD VACCINES (3 - Td or Tdap) 2033    COVID-19 Vaccine  Completed    RSV Vaccine - Adults  Completed    Pneumococcal Vaccine 65+  Completed       No orders of the defined types were placed in this encounter.      Return in about 5 months (around 9/15/2024) for Next scheduled follow up.

## 2024-04-15 NOTE — PROGRESS NOTES
The ABCs of the Annual Wellness Visit  Subsequent Medicare Wellness Visit    Subjective      Eric Dukes is a 84 y.o. male who presents for a Subsequent Medicare Wellness Visit.    The following portions of the patient's history were reviewed and   updated as appropriate: allergies, current medications, past family history, past medical history, past social history, past surgical history, and problem list.    Compared to one year ago, the patient feels his physical   health is the same.    Compared to one year ago, the patient feels his mental   health is the same.    Recent Hospitalizations:  He was not admitted to the hospital during the last year.       Current Medical Providers:  Patient Care Team:  Verena Plata APRN ([place]) as PCP - General (Family Medicine)  Eric Tang MD as Referring Physician (Family Medicine)  Yair Rodriguez MD as Consulting Physician (Hematology and Oncology)  Erik Cooper MD as Consulting Physician (Rheumatology)  Denise Bronw MD as Consulting Physician (Nephrology)    Outpatient Medications Prior to Visit   Medication Sig Dispense Refill    allopurinol (ZYLOPRIM) 300 MG tablet Take 1 tablet by mouth Daily.      amLODIPine (NORVASC) 10 MG tablet Take 1 tablet by mouth Daily. 90 tablet 1    aspirin 81 MG tablet Take 1 tablet by mouth Daily.      Blood Glucose Monitoring Suppl (FREESTYLE LITE) device Check blood sugar once per day 100 each 3    glucose blood (FREESTYLE LITE) test strip Test blood sugar once daily 100 each 3    losartan (COZAAR) 50 MG tablet Take 1 tablet by mouth Daily. 180 tablet 1    metFORMIN (GLUCOPHAGE) 500 MG tablet Take 1 tablet by mouth 3 (Three) Times a Day. 270 tablet 1    metoprolol tartrate (LOPRESSOR) 100 MG tablet Take 1 tablet by mouth 2 (Two) Times a Day. 180 tablet 1    Multiple Vitamin (MULTIVITAMIN) tablet Take 1 tablet by mouth Daily.      Omega-3 Fatty Acids (FISH OIL) 1000 MG capsule capsule Take 1 capsule by mouth 3 (Three) Times a  "Day With Meals.      omeprazole (priLOSEC) 20 MG capsule Take 1 capsule by mouth Daily. 90 capsule 1    simvastatin (ZOCOR) 40 MG tablet Take 1 tablet by mouth Every Night. 90 tablet 1    tamsulosin (FLOMAX) 0.4 MG capsule 24 hr capsule Take 1 capsule by mouth Daily. 90 capsule 1    terazosin (HYTRIN) 1 MG capsule Take 1 capsule by mouth Every Night. 90 capsule 1     No facility-administered medications prior to visit.       No opioid medication identified on active medication list. I have reviewed chart for other potential  high risk medication/s and harmful drug interactions in the elderly.        Aspirin is on active medication list. Aspirin use is indicated based on review of current medical condition/s. Pros and cons of this therapy have been discussed today. Benefits of this medication outweigh potential harm.  Patient has been encouraged to continue taking this medication.  .      Patient Active Problem List   Diagnosis    Hyperlipidemia    Essential hypertension    Gout    Type 2 diabetes mellitus without complication    Gastroesophageal reflux disease without esophagitis    History of adenomatous polyp of colon    History of iron deficiency anemia    Bence Burger proteinuria    Chronic kidney disease, stage 2 (mild)    Hypercalcemia    Hyperkalemia    Essential (primary) hypertension    Hypertensive chronic kidney disease with stage 1 through stage 4 chronic kidney disease, or unspecified chronic kidney disease    Gout     Advance Care Planning   Advance Care Planning     Advance Directive is not on file.       Objective    Vitals:    04/15/24 0811   BP: 124/50   Pulse: 58   Resp: 16   SpO2: 94%   Weight: 80.3 kg (177 lb)   Height: 170.2 cm (67\")   PainSc: 0-No pain     Estimated body mass index is 27.72 kg/m² as calculated from the following:    Height as of this encounter: 170.2 cm (67\").    Weight as of this encounter: 80.3 kg (177 lb).    BMI is >= 25 and <30. (Overweight) The following options were " offered after discussion;: exercise counseling/recommendations and nutrition counseling/recommendations      Does the patient have evidence of cognitive impairment?   No            HEALTH RISK ASSESSMENT    Smoking Status:  Social History     Tobacco Use   Smoking Status Former    Current packs/day: 1.00    Average packs/day: 1 pack/day for 30.0 years (30.0 ttl pk-yrs)    Types: Cigarettes   Smokeless Tobacco Never     Alcohol Consumption:  Social History     Substance and Sexual Activity   Alcohol Use Yes    Comment: social     Fall Risk Screen:    STEADI Fall Risk Assessment was completed, and patient is at LOW risk for falls.Assessment completed on:4/15/2024    Depression Screenin/15/2024     8:00 AM   PHQ-2/PHQ-9 Depression Screening   Little Interest or Pleasure in Doing Things 0-->not at all   Feeling Down, Depressed or Hopeless 0-->not at all   PHQ-9: Brief Depression Severity Measure Score 0       Health Habits and Functional and Cognitive Screenin/15/2024     8:00 AM   Functional & Cognitive Status   Do you have difficulty preparing food and eating? No   Do you have difficulty bathing yourself, getting dressed or grooming yourself? No   Do you have difficulty using the toilet? No   Do you have difficulty moving around from place to place? No   Do you have trouble with steps or getting out of a bed or a chair? No   Current Diet Diabetic Diet   Dental Exam Up to date   Eye Exam Up to date   Exercise (times per week) 7 times per week   Current Exercises Include Other   Do you need help using the phone?  No   Are you deaf or do you have serious difficulty hearing?  No   Do you need help to go to places out of walking distance? No   Do you need help shopping? No   Do you need help preparing meals?  No   Do you need help with housework?  No   Do you need help with laundry? No   Do you need help taking your medications? No   Do you need help managing money? No   Do you ever drive or ride in a car  without wearing a seat belt? No   Have you felt unusual stress, anger or loneliness in the last month? No   Who do you live with? Alone   If you need help, do you have trouble finding someone available to you? No   Have you been bothered in the last four weeks by sexual problems? No   Do you have difficulty concentrating, remembering or making decisions? No       Age-appropriate Screening Schedule:  Refer to the list below for future screening recommendations based on patient's age, sex and/or medical conditions. Orders for these recommended tests are listed in the plan section. The patient has been provided with a written plan.    Health Maintenance   Topic Date Due    ZOSTER VACCINE (2 of 2) 04/15/2024 (Originally 11/17/2009)    URINE MICROALBUMIN  07/12/2024    HEMOGLOBIN A1C  07/29/2024    INFLUENZA VACCINE  08/01/2024    DIABETIC EYE EXAM  10/12/2024    LIPID PANEL  01/29/2025    DIABETIC FOOT EXAM  03/22/2025    ANNUAL WELLNESS VISIT  04/15/2025    BMI FOLLOWUP  04/15/2025    COLORECTAL CANCER SCREENING  03/20/2028    TDAP/TD VACCINES (3 - Td or Tdap) 07/12/2033    COVID-19 Vaccine  Completed    RSV Vaccine - Adults  Completed    Pneumococcal Vaccine 65+  Completed                  CMS Preventative Services Quick Reference  Risk Factors Identified During Encounter:    None Identified    The above risks/problems have been discussed with the patient.  Pertinent information has been shared with the patient in the After Visit Summary.    There are no diagnoses linked to this encounter.    Follow Up:   Next Medicare Wellness visit to be scheduled in 1 year.      An After Visit Summary and PPPS were made available to the patient.

## 2024-08-17 DIAGNOSIS — E11.9 TYPE 2 DIABETES MELLITUS WITHOUT COMPLICATION, WITHOUT LONG-TERM CURRENT USE OF INSULIN: ICD-10-CM

## 2024-08-17 DIAGNOSIS — K21.9 GASTROESOPHAGEAL REFLUX DISEASE WITHOUT ESOPHAGITIS: ICD-10-CM

## 2024-08-19 RX ORDER — OMEPRAZOLE 20 MG/1
20 CAPSULE, DELAYED RELEASE ORAL DAILY
Qty: 90 CAPSULE | Refills: 3 | OUTPATIENT
Start: 2024-08-19

## 2024-08-28 DIAGNOSIS — N40.1 BENIGN PROSTATIC HYPERPLASIA WITH WEAK URINARY STREAM: ICD-10-CM

## 2024-08-28 DIAGNOSIS — R39.12 BENIGN PROSTATIC HYPERPLASIA WITH WEAK URINARY STREAM: ICD-10-CM

## 2024-08-29 RX ORDER — TERAZOSIN 1 MG/1
1 CAPSULE ORAL NIGHTLY
Qty: 90 CAPSULE | Refills: 0 | Status: SHIPPED | OUTPATIENT
Start: 2024-08-29

## 2024-08-29 RX ORDER — TAMSULOSIN HYDROCHLORIDE 0.4 MG/1
1 CAPSULE ORAL DAILY
Qty: 90 CAPSULE | Refills: 0 | Status: SHIPPED | OUTPATIENT
Start: 2024-08-29

## 2024-09-05 ENCOUNTER — OFFICE VISIT (OUTPATIENT)
Dept: FAMILY MEDICINE CLINIC | Facility: CLINIC | Age: 84
End: 2024-09-05
Payer: MEDICARE

## 2024-09-05 VITALS
BODY MASS INDEX: 25.99 KG/M2 | HEART RATE: 53 BPM | TEMPERATURE: 97.8 F | SYSTOLIC BLOOD PRESSURE: 110 MMHG | WEIGHT: 165.6 LBS | OXYGEN SATURATION: 97 % | DIASTOLIC BLOOD PRESSURE: 60 MMHG | HEIGHT: 67 IN

## 2024-09-05 DIAGNOSIS — E11.9 TYPE 2 DIABETES MELLITUS WITHOUT COMPLICATION, WITHOUT LONG-TERM CURRENT USE OF INSULIN: ICD-10-CM

## 2024-09-05 DIAGNOSIS — R39.12 BENIGN PROSTATIC HYPERPLASIA WITH WEAK URINARY STREAM: ICD-10-CM

## 2024-09-05 DIAGNOSIS — N40.1 BENIGN PROSTATIC HYPERPLASIA WITH WEAK URINARY STREAM: ICD-10-CM

## 2024-09-05 DIAGNOSIS — K21.9 GASTROESOPHAGEAL REFLUX DISEASE WITHOUT ESOPHAGITIS: ICD-10-CM

## 2024-09-05 PROCEDURE — 3074F SYST BP LT 130 MM HG: CPT | Performed by: NURSE PRACTITIONER

## 2024-09-05 PROCEDURE — 3078F DIAST BP <80 MM HG: CPT | Performed by: NURSE PRACTITIONER

## 2024-09-05 PROCEDURE — 99214 OFFICE O/P EST MOD 30 MIN: CPT | Performed by: NURSE PRACTITIONER

## 2024-09-05 PROCEDURE — 1126F AMNT PAIN NOTED NONE PRSNT: CPT | Performed by: NURSE PRACTITIONER

## 2024-09-05 PROCEDURE — 1159F MED LIST DOCD IN RCRD: CPT | Performed by: NURSE PRACTITIONER

## 2024-09-05 PROCEDURE — 1160F RVW MEDS BY RX/DR IN RCRD: CPT | Performed by: NURSE PRACTITIONER

## 2024-09-05 RX ORDER — TERAZOSIN 1 MG/1
1 CAPSULE ORAL NIGHTLY
Qty: 90 CAPSULE | Refills: 1 | Status: SHIPPED | OUTPATIENT
Start: 2024-09-05

## 2024-09-05 RX ORDER — TAMSULOSIN HYDROCHLORIDE 0.4 MG/1
1 CAPSULE ORAL DAILY
Qty: 90 CAPSULE | Refills: 1 | Status: SHIPPED | OUTPATIENT
Start: 2024-09-05

## 2024-09-05 RX ORDER — HYDROCHLOROTHIAZIDE 25 MG/1
25 TABLET ORAL DAILY
COMMUNITY

## 2024-09-05 RX ORDER — TRIAMCINOLONE ACETONIDE 1 MG/G
CREAM TOPICAL
COMMUNITY
Start: 2024-05-22

## 2024-09-05 NOTE — PROGRESS NOTES
"Subjective   Eric Dukes is a 84 y.o. male.     History of Present Illness    Since the last visit, he has overall felt well.  He has Primary Hypertension and well controlled on current medication, DMII well controlled on medication and will continue regimen, and GERD controlled on PPI Rx.  he has been compliant with current medications have reviewed them.  The patient denies medication side effects.  Will refill medications. /60 (BP Location: Left arm, Patient Position: Sitting, Cuff Size: Adult)   Pulse 53   Temp 97.8 °F (36.6 °C) (Oral)   Ht 170.2 cm (67\")   Wt 75.1 kg (165 lb 9.6 oz)   SpO2 97%   BMI 25.94 kg/m² .          Results for orders placed or performed during the hospital encounter of 03/20/23   POC Glucose Once    Specimen: Blood   Result Value Ref Range    Glucose 155 (H) 70 - 130 mg/dL   Tissue Pathology Exam    Specimen: Large Intestine, Transverse Colon; Polyp   Result Value Ref Range    Case Report       Surgical Pathology Report                         Case: [id number]                                  Authorizing Provider:  Nba Hyman MD    Collected:           03/20/2023 01:29 PM          Ordering Location:     [facility]  Received:            03/20/2023 02:33 PM                                 ENDO SUITES                                                                  Pathologist:           Lui Morrison MD                                                         Specimen:    Large Intestine, Transverse Colon, polyps x3                                               Final Diagnosis       1. Transverse Colon Polyps x3 Biopsy:   A. Fragments of tubular adenoma and focally eroded hyperplastic polyp with inflamed granulation tissue.  B. No high-grade dysplasia nor malignancy identified.     jat/patricia       Gross Description       1. Large Intestine, Transverse Colon.  The specimen is received in formalin labeled with the patient's name and further " designated 'transverse colon polyps biopsy' are multiple small fragments of gray-tan tissue. The specimen is submitted for embedding as received.           [facility] and had elevated potassium.  He states losartan was cut in half and HCTZ was added. He had repeat potassium that was still elevated so had rechecked today and results are pending.    A1c was 6.  He also had urine microalbumin.  Did not have lipid panel with labs in July.  He has eye exam in October.      The following portions of the patient's history were reviewed and updated as appropriate: allergies, current medications, past family history, past medical history, past social history, past surgical history, and problem list.    Review of Systems   Constitutional:  Negative for fatigue.   Respiratory:  Negative for cough and shortness of breath.    Cardiovascular:  Negative for chest pain and palpitations.   Skin:  Negative for rash.   Psychiatric/Behavioral:  Negative for dysphoric mood and sleep disturbance. The patient is not nervous/anxious.        Objective   Physical Exam  Vitals and nursing note reviewed.   Constitutional:       Appearance: Normal appearance. He is well-developed.   Neck:      Vascular: No carotid bruit.   Cardiovascular:      Rate and Rhythm: Regular rhythm. Bradycardia present.   Pulmonary:      Effort: Pulmonary effort is normal.      Breath sounds: Normal breath sounds.   Skin:     General: Skin is warm and dry.   Neurological:      Mental Status: He is alert and oriented to person, place, and time.   Psychiatric:         Mood and Affect: Mood normal.         Behavior: Behavior normal.         Thought Content: Thought content normal.         Judgment: Judgment normal.         Assessment & Plan   Diagnoses and all orders for this visit:    1. Type 2 diabetes mellitus without complication, without long-term current use of insulin  -     metFORMIN (GLUCOPHAGE) 500 MG tablet; Take 1 tablet by mouth 3 (Three) Times a  Day.  Dispense: 270 tablet; Refill: 1    2. Gastroesophageal reflux disease without esophagitis  -     omeprazole (priLOSEC) 20 MG capsule; Take 1 capsule by mouth Daily.  Dispense: 90 capsule; Refill: 1    3. Benign prostatic hyperplasia with weak urinary stream  -     tamsulosin (FLOMAX) 0.4 MG capsule 24 hr capsule; Take 1 capsule by mouth Daily.  Dispense: 90 capsule; Refill: 1  -     terazosin (HYTRIN) 1 MG capsule; Take 1 capsule by mouth Every Night.  Dispense: 90 capsule; Refill: 1

## 2024-10-25 NOTE — TELEPHONE ENCOUNTER
Pt called stating he is out of Metformin - needs some sent to [facility].  Pt has appt 09/25/19.  Pt states that he takes 2 tabs BID.     Pt was using CPAP, stopped using few months ago, awaiting for new machine.

## (undated) DEVICE — ADAPT CLN BIOGUARD AIR/H2O DISP

## (undated) DEVICE — CANN O2 ETCO2 FITS ALL CONN CO2 SMPL A/ 7IN DISP LF

## (undated) DEVICE — LN SMPL CO2 SHTRM SD STREAM W/M LUER

## (undated) DEVICE — KT ORCA ORCAPOD DISP STRL

## (undated) DEVICE — SNAR POLYP CAPTIVATOR RND STFF 2.4 240CM 10MM 1P/U

## (undated) DEVICE — THE SINGLE USE ETRAP – POLYP TRAP IS USED FOR SUCTION RETRIEVAL OF ENDOSCOPICALLY REMOVED POLYPS.: Brand: ETRAP

## (undated) DEVICE — SENSR O2 OXIMAX FNGR A/ 18IN NONSTR

## (undated) DEVICE — TUBING, SUCTION, 1/4" X 10', STRAIGHT: Brand: MEDLINE